# Patient Record
Sex: MALE | Race: WHITE | Employment: FULL TIME | URBAN - METROPOLITAN AREA
[De-identification: names, ages, dates, MRNs, and addresses within clinical notes are randomized per-mention and may not be internally consistent; named-entity substitution may affect disease eponyms.]

---

## 2019-03-25 ENCOUNTER — HOSPITAL ENCOUNTER (EMERGENCY)
Age: 34
Discharge: HOME | End: 2019-03-25
Attending: EMERGENCY MEDICINE
Payer: COMMERCIAL

## 2019-03-25 ENCOUNTER — APPOINTMENT (OUTPATIENT)
Dept: ULTRASOUND IMAGING | Age: 34
End: 2019-03-25
Attending: PHYSICIAN ASSISTANT
Payer: COMMERCIAL

## 2019-03-25 VITALS
TEMPERATURE: 97.4 F | BODY MASS INDEX: 38.48 KG/M2 | OXYGEN SATURATION: 100 % | WEIGHT: 253.09 LBS | RESPIRATION RATE: 18 BRPM | HEART RATE: 70 BPM

## 2019-03-25 DIAGNOSIS — N50.89 TESTICULAR SWELLING, RIGHT: ICD-10-CM

## 2019-03-25 DIAGNOSIS — N50.811 TESTICULAR PAIN, RIGHT: Primary | ICD-10-CM

## 2019-03-25 LAB
APPEARANCE UR: CLEAR
BACTERIA URNS QL MICRO: NEGATIVE /HPF
BILIRUB UR QL: NEGATIVE
COLOR UR: NORMAL
EPITH CASTS URNS QL MICRO: NORMAL /LPF
GLUCOSE UR STRIP.AUTO-MCNC: NEGATIVE MG/DL
HGB UR QL STRIP: NEGATIVE
HYALINE CASTS URNS QL MICRO: NORMAL /LPF (ref 0–5)
KETONES UR QL STRIP.AUTO: NEGATIVE MG/DL
LEUKOCYTE ESTERASE UR QL STRIP.AUTO: NEGATIVE
NITRITE UR QL STRIP.AUTO: NEGATIVE
PH UR STRIP: 5.5 [PH] (ref 5–8)
PROT UR STRIP-MCNC: NEGATIVE MG/DL
RBC #/AREA URNS HPF: NORMAL /HPF (ref 0–5)
SP GR UR REFRACTOMETRY: 1.02 (ref 1–1.03)
UA: UC IF INDICATED,UAUC: NORMAL
UROBILINOGEN UR QL STRIP.AUTO: 0.2 EU/DL (ref 0.2–1)
WBC URNS QL MICRO: NORMAL /HPF (ref 0–4)

## 2019-03-25 PROCEDURE — 81001 URINALYSIS AUTO W/SCOPE: CPT

## 2019-03-25 PROCEDURE — 99283 EMERGENCY DEPT VISIT LOW MDM: CPT

## 2019-03-25 PROCEDURE — 76870 US EXAM SCROTUM: CPT

## 2019-03-25 PROCEDURE — 87491 CHLMYD TRACH DNA AMP PROBE: CPT

## 2019-03-25 RX ORDER — HYDROCODONE BITARTRATE AND ACETAMINOPHEN 7.5; 325 MG/1; MG/1
1 TABLET ORAL
Qty: 10 TAB | Refills: 0 | Status: SHIPPED | OUTPATIENT
Start: 2019-03-25 | End: 2019-03-28

## 2019-03-25 RX ORDER — IBUPROFEN 800 MG/1
800 TABLET ORAL
Qty: 20 TAB | Refills: 0 | Status: SHIPPED | OUTPATIENT
Start: 2019-03-25 | End: 2019-04-01

## 2019-03-25 NOTE — LETTER
[place] 
[facility] 
[address] [place] [address] [address] [id number] 
[phone] Work/School Note Date: 3/25/2019 To Whom It May concern: 
 
Ambreen Ovalle was seen and treated today in the emergency room by the following provider(s): 
Attending Provider: Juliet Waldron DO Physician Assistant: IRENE Lara. Ambreen Ovalle may return to work on [id number]. Sincerely, IRENE Jones

## 2019-03-25 NOTE — ED PROVIDER NOTES
EMERGENCY DEPARTMENT HISTORY AND PHYSICAL EXAM 
 
 
Date: 3/25/2019 Patient Name: Carolyn Eller History of Presenting Illness Chief Complaint Patient presents with  Testicle Pain  
  right testicular pain with associated swelling x three days History Provided By: Patient HPI: Carolyn Eller, 29 y.o. male presents ambulatory to the ED with cc of 3 days of 9 out of 10 acute on chronic right testicular pain and swelling that is worse with palpation. He tells me he has had swelling in the right testicle for years and has had at least a low level ache with no significant pain. He tells me years ago he talked to his primary care who put him on some antibiotics and treated the problem in that manner. The intense pain symptoms started suddenly Saturday morning upon waking. He denies any fever, nausea, vomiting, abdominal, pain, dysuria, urgency, frequency or hesitancy. He denies urethral discharge. There are no other complaints, changes, or physical findings at this time. PCP: Jesus Barrera MD 
 
No current facility-administered medications on file prior to encounter. No current outpatient medications on file prior to encounter. Past History Past Medical History: 
Past Medical History:  
Diagnosis Date  Anxiety disorder  Depression  Suicidal thoughts Past Surgical History: 
Past Surgical History:  
Procedure Laterality Date  HX ORTHOPAEDIC    
 lft knee surgery  HX ORTHOPAEDIC    
 right shoulder/right knee Family History: 
History reviewed. No pertinent family history. Social History: 
Social History Tobacco Use  Smoking status: Current Every Day Smoker Packs/day: 0.50  Smokeless tobacco: Former User Substance Use Topics  Alcohol use: Yes Comment: per pt \"once a weekend 6 or 7 beers\"  Drug use: No  
 
 
Allergies: 
No Known Allergies Review of Systems Review of Systems Constitutional: Negative for fatigue and fever. HENT: Negative for congestion, ear pain and rhinorrhea. Eyes: Negative for pain and redness. Respiratory: Negative for cough and wheezing. Cardiovascular: Negative for chest pain and palpitations. Gastrointestinal: Negative for abdominal pain, nausea and vomiting. Genitourinary: Positive for testicular pain (And swelling). Negative for dysuria, frequency and urgency. Musculoskeletal: Negative for back pain, neck pain and neck stiffness. Skin: Negative for rash and wound. Neurological: Negative for weakness, light-headedness, numbness and headaches. Physical Exam  
Physical Exam  
Constitutional: He is oriented to person, place, and time. He appears well-developed and well-nourished. Non-toxic appearance. No distress. HENT:  
Head: Normocephalic and atraumatic. Head is without right periorbital erythema and without left periorbital erythema. Right Ear: External ear normal.  
Left Ear: External ear normal.  
Nose: Nose normal.  
Mouth/Throat: Uvula is midline. No trismus in the jaw. Eyes: Pupils are equal, round, and reactive to light. Conjunctivae and EOM are normal. No scleral icterus. Neck: Normal range of motion and full passive range of motion without pain. Cardiovascular: Normal rate, regular rhythm and normal heart sounds. Pulmonary/Chest: Effort normal and breath sounds normal. No accessory muscle usage. No tachypnea. No respiratory distress. He has no decreased breath sounds. He has no wheezes. Abdominal: Soft. There is no tenderness. There is no rigidity and no guarding. Genitourinary: Right testis shows swelling and tenderness. Genitourinary Comments: Bilateral descended testes Generalized swelling / enlargement of the right testicle. No specific redness Open-book maneuver does not improve symptoms. Diffuse tenderness Musculoskeletal: Normal range of motion. Neurological: He is alert and oriented to person, place, and time. He is not disoriented. No cranial nerve deficit or sensory deficit. GCS eye subscore is 4. GCS verbal subscore is 5. GCS motor subscore is 6. Skin: Skin is intact. No rash noted. Psychiatric: He has a normal mood and affect. His speech is normal.  
Nursing note and vitals reviewed. Diagnostic Study Results Labs - Recent Results (from the past 12 hour(s)) URINALYSIS W/ REFLEX CULTURE Collection Time: 03/25/19 12:37 PM  
Result Value Ref Range Color YELLOW/STRAW Appearance CLEAR CLEAR Specific gravity 1.021 1.003 - 1.030    
 pH (UA) 5.5 5.0 - 8.0 Protein NEGATIVE  NEG mg/dL Glucose NEGATIVE  NEG mg/dL Ketone NEGATIVE  NEG mg/dL Bilirubin NEGATIVE  NEG Blood NEGATIVE  NEG Urobilinogen 0.2 0.2 - 1.0 EU/dL Nitrites NEGATIVE  NEG Leukocyte Esterase NEGATIVE  NEG    
 WBC 0-4 0 - 4 /hpf  
 RBC 0-5 0 - 5 /hpf Epithelial cells FEW FEW /lpf Bacteria NEGATIVE  NEG /hpf  
 UA:UC IF INDICATED CULTURE NOT INDICATED BY UA RESULT CNI Hyaline cast 0-2 0 - 5 /lpf Radiologic Studies -  
US SCROTUM/TESTICLES Final Result IMPRESSION: Enlarged heterogeneous right testicle with hypervascularity and  
thickening of the spermatic cord. The appearance suggests intermittent torsion  
with partial infarction of the testis versus a testicular mass. CT Results  (Last 48 hours) None CXR Results  (Last 48 hours) None Medical Decision Making I am the first provider for this patient. I reviewed the vital signs, available nursing notes, past medical history, past surgical history, family history and social history. Vital Signs-Reviewed the patient's vital signs. Patient Vitals for the past 12 hrs: 
 Temp Pulse Resp SpO2  
03/25/19 1014 97.4 °F (36.3 °C) 70 18 100 % Pulse Oximetry Analysis - 100% on RA 
 
 Records Reviewed: Nursing Notes, Old Medical Records, Previous Radiology Studies and Previous Laboratory Studies Provider Notes (Medical Decision Making): DDx: Testicular torsion, testicular cancer, hydrocele, spermatocele, varicocele, epididymitis, STI 
 
1:19 PM 
Urology consult made to [facility]; awaiting reply. Patient remains stable; will continue to monitor. 1:48 PM 
Spoke with Nian Astorga from [facility]. Dr. James Souza suggests discharging the patient and having him come to the Urology Clinic in [address] [place] [address],[address] now. I believe this is safe. Patient states his understanding and agreement. 2:06 PM 
I just returned from walking patient to [address] [place] [address] Floor 
 
ED Course:  
Initial assessment performed. The patients presenting problems have been discussed, and they are in agreement with the care plan formulated and outlined with them. I have encouraged them to ask questions as they arise throughout their visit. Disposition: 
Discharge PLAN: 
1. Discharge Medication List as of 3/25/2019  1:52 PM  
  
START taking these medications Details HYDROcodone-acetaminophen (LORTAB 7.5-325) 7.5-325 mg per tablet Take 1 Tab by mouth every eight (8) hours as needed for Pain for up to 3 days. Max Daily Amount: 3 Tabs. Indications: Pain, Print, Disp-10 Tab, R-0  
  
ibuprofen (MOTRIN) 800 mg tablet Take 1 Tab by mouth every eight (8) hours as needed for Pain for up to 7 days. , Print, Disp-20 Tab, R-0  
  
  
 
2. Follow-up Information Follow up With Specialties Details Why Contact Info [facility]   Go directly to the Urology [address] [place] [address] [address]   [phone] Return to ED if worse Diagnosis Clinical Impression: 1. Testicular pain, right 2. Testicular swelling, right

## 2019-03-27 LAB
C TRACH DNA SPEC QL NAA+PROBE: NEGATIVE
N GONORRHOEA DNA SPEC QL NAA+PROBE: NEGATIVE
SAMPLE TYPE: NORMAL
SERVICE CMNT-IMP: NORMAL
SPECIMEN SOURCE: NORMAL

## 2019-04-03 ENCOUNTER — HOSPITAL ENCOUNTER (OUTPATIENT)
Dept: CT IMAGING | Age: 34
Discharge: HOME | End: 2019-04-03
Attending: UROLOGY
Payer: COMMERCIAL

## 2019-04-03 DIAGNOSIS — N50.89 SCROTAL MASS: ICD-10-CM

## 2019-04-03 PROCEDURE — 74011636320 HC RX REV CODE- 636/320: Performed by: UROLOGY

## 2019-04-03 PROCEDURE — 74177 CT ABD & PELVIS W/CONTRAST: CPT

## 2019-04-03 PROCEDURE — 71260 CT THORAX DX C+: CPT

## 2019-04-03 RX ORDER — SODIUM CHLORIDE 0.9 % (FLUSH) 0.9 %
10 SYRINGE (ML) INJECTION
Status: COMPLETED | OUTPATIENT
Start: 2019-04-03 | End: 2019-04-03

## 2019-04-03 RX ADMIN — IOPAMIDOL 100 ML: 755 INJECTION, SOLUTION INTRAVENOUS at 15:00

## 2019-04-03 RX ADMIN — Medication 10 ML: at 15:00

## 2019-04-03 RX ADMIN — IOHEXOL 50 ML: 240 INJECTION, SOLUTION INTRATHECAL; INTRAVASCULAR; INTRAVENOUS; ORAL at 15:00

## 2019-04-08 ENCOUNTER — OFFICE VISIT (OUTPATIENT)
Dept: ONCOLOGY | Age: 34
End: 2019-04-08

## 2019-04-08 VITALS
SYSTOLIC BLOOD PRESSURE: 114 MMHG | TEMPERATURE: 98.5 F | OXYGEN SATURATION: 95 % | HEIGHT: 68 IN | HEART RATE: 86 BPM | BODY MASS INDEX: 38.49 KG/M2 | WEIGHT: 254 LBS | DIASTOLIC BLOOD PRESSURE: 86 MMHG | RESPIRATION RATE: 16 BRPM

## 2019-04-08 DIAGNOSIS — T45.1X5A CINV (CHEMOTHERAPY-INDUCED NAUSEA AND VOMITING): ICD-10-CM

## 2019-04-08 DIAGNOSIS — C62.91 SEMINOMA OF RIGHT TESTIS, STAGE 2 (HCC): Primary | ICD-10-CM

## 2019-04-08 DIAGNOSIS — R11.2 CINV (CHEMOTHERAPY-INDUCED NAUSEA AND VOMITING): ICD-10-CM

## 2019-04-08 PROBLEM — E66.01 SEVERE OBESITY (HCC): Status: ACTIVE | Noted: 2019-04-08

## 2019-04-08 RX ORDER — ONDANSETRON 4 MG/1
4 TABLET, ORALLY DISINTEGRATING ORAL
Qty: 30 TAB | Refills: 2 | Status: SHIPPED | OUTPATIENT
Start: 2019-04-08 | End: 2019-07-06

## 2019-04-08 RX ORDER — PROCHLORPERAZINE MALEATE 10 MG
10 TABLET ORAL
Qty: 30 TAB | Refills: 3 | Status: SHIPPED | OUTPATIENT
Start: 2019-04-08 | End: 2019-05-08

## 2019-04-08 RX ORDER — LIDOCAINE AND PRILOCAINE 25; 25 MG/G; MG/G
CREAM TOPICAL AS NEEDED
Qty: 30 G | Refills: 0 | Status: SHIPPED | OUTPATIENT
Start: 2019-04-08 | End: 2020-02-26

## 2019-04-08 NOTE — PROGRESS NOTES
Initial chemo teaching completed on BEP, written material reviewed and copies given to Pt and girlfriend, all questions answered. Consent signed, chemo packet given.
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
## 2019-04-09 NOTE — PROGRESS NOTES
Oncology Consultation        Patient: Joyce Barber MRN: [id number]  SSN: [id number]    YOB: 1985  Age: 29 y.o. Sex: male        Diagnosis:      1. 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 Right sided radical orchiectomy    Subjective:      Joyce Barber is a 29 y.o. male male who noted a swelling in the right testicles for over 6 months. The swelling was associated with a dragging sensation. The patient then was seen by Dr. Will Sylvester. He underwent a right radical orchiectomy on 03/27/2019. The pathology shows T1c disease. A CT scan was done on 04/03 which reveals metastatic disease. The patient is now referred for evaluation and consideration of adjuvant chemotherapy. He works in the [address] [place] [address] [employer]. Review of Systems:    Constitutional: negative  Eyes: negative  Ears, Nose, Mouth, Throat, and Face: negative  Respiratory: negative  Cardiovascular: negative  Gastrointestinal: negative  Genitourinary:negative  Integument/Breast: negative  Hematologic/Lymphatic: negative  Musculoskeletal:negative  Neurological: negative    Past Medical History:   Diagnosis Date    Anxiety disorder     Depression     Suicidal thoughts      Past Surgical History:   Procedure Laterality Date    HX ORTHOPAEDIC      lft knee surgery    HX ORTHOPAEDIC      right shoulder/right knee      History reviewed. No pertinent family history. Social History     Tobacco Use    Smoking status: Current Every Day Smoker     Packs/day: 0.50    Smokeless tobacco: Former User   Substance Use Topics    Alcohol use: Yes     Comment: per pt \"once a weekend 6 or 7 beers\"      Prior to Admission medications    Medication Sig Start Date End Date Taking? Authorizing Provider   ondansetron (ZOFRAN ODT) 4 mg disintegrating tablet Take 1 Tab by mouth every eight (8) hours as needed for Nausea.  4/8/19  Yes Janae Sanders NP   prochlorperazine (COMPAZINE) 10 mg tablet Take 1 Tab by mouth every six (6) hours as needed for Nausea for up to 30 days. 4/8/19 5/8/19 Yes Jed Sanders, NP   lidocaine-prilocaine (EMLA) topical cream Apply  to affected area as needed for Pain. 4/8/19  Yes Anson Gray NP              No Known Allergies        Objective:     Vitals:    04/08/19 1408   BP: 114/86   Pulse: 86   Resp: 16   Temp: 98.5 °F (36.9 °C)   TempSrc: Oral   SpO2: 95%   Weight: 254 lb (115.2 kg)   Height: 5' 8\" (1.727 m)            Physical Exam:    GENERAL: alert, cooperative, no distress, appears stated age  EYE: conjunctivae/corneas clear. PERRL, EOM's intact. Fundi benign  LYMPHATIC: Cervical, supraclavicular, and axillary nodes normal.   THROAT & NECK: normal and no erythema or exudates noted. LUNG: clear to auscultation bilaterally  HEART: regular rate and rhythm, S1, S2 normal, no murmur, click, rub or gallop  ABDOMEN: soft, non-tender. Bowel sounds normal. No masses,  no organomegaly  EXTREMITIES:  extremities normal, atraumatic, no cyanosis or edema  SKIN: extensive tattoo on the torso and arms  NEUROLOGIC: AOx3. Gait normal. Reflexes and motor strength normal and symmetric. Cranial nerves 2-12 and sensation grossly intact. CT Results (most recent):  Results from Hospital Encounter encounter on 04/03/19   CT CHEST W CONT    Narrative INDICATION: Testicular cancer removed 3/27/2019. COMPARISON: CT abdomen pelvis 9/30/2013 and CTA thorax 12/15/2010. TECHNIQUE:  Following the uneventful intravenous administration of 100 cc  Isovue-300, 5 mm axial images were obtained through the chest, abdomen, and  pelvis. Oral contrast administered. Coronal and sagittal reconstructions were  generated. CT dose reduction was achieved through use of a standardized protocol  tailored for this examination and automatic exposure control for dose  modulation. FINDINGS:    THYROID: No nodule. MEDIASTINUM: No mass or lymphadenopathy. EVE: No mass or lymphadenopathy.   THORACIC AORTA: No dissection or aneurysm. MAIN PULMONARY ARTERY: Normal in caliber. TRACHEA/BRONCHI: Patent. ESOPHAGUS: No wall thickening or dilatation. HEART: Normal in size. PLEURA: No effusion or pneumothorax. LUNGS: No nodule, mass, or airspace disease. LIVER: Unremarkable. GALLBLADDER: Unremarkable. SPLEEN: Unremarkable. PANCREAS: No mass or duct dilation. ADRENALS: Unremarkable. KIDNEYS: No mass, calculus, or hydronephrosis. STOMACH: Unremarkable. SMALL BOWEL: No dilatation or wall thickening. COLON: No dilation or wall thickening. APPENDIX: Unremarkable. PERITONEUM: No ascites or pneumoperitoneum. RETROPERITONEUM: There is an enlarged aortocaval lymph node measuring 2.7 x 3.6  x 4.2 cm (2, 90). No other enlarged adenopathy. No aneurysm or dissection. REPRODUCTIVE ORGANS: The seminal vesicles and prostate appear unremarkable. URINARY BLADDER: No mass or calculus. BONES: No acute fracture or aggressive lesion. Partial visualization of  intramedullary canal with 2 interlocking screws in the proximal right femur. ADDITIONAL COMMENTS: There is stranding in the right inguinal canal without  evident mass or suspicious collection. Impression IMPRESSION: Enlarged aortocaval retroperitoneal adenopathy suspicious for  metastatic testicular neoplasm. No other sites of metastatic disease identified  within the thorax, abdomen, or pelvis with postoperative changes along the right  inguinal canal noted. 23X             Assessment:     1. 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 The standard of care for the treatment of T1b N2 seminomatous germ cell tumor of the testis is 3 cycles of systemic chemotherapy (BEP).  Thus I recommend administering  cycles of BEP as adjuvant treatment. The duration of this treatment plan will be for 9 weeks, intolerable side effects, or patient choice. Patient will be meeting with navigation services to discuss any financial barriers to care/estimated cost of care. We will plan to see the patient in follow up at least once per cycle, or sooner if symptoms warrant. The patient's emotional well being was addressed during this office visit and patient seems to be coping well with the diagnosis and the treatment. Common Side Effects of Chemotherapy  Decreased Blood Counts Your blood counts can decrease temporarily due to chemotherapy, they will recover over time. This is an expected side effect that your Doctor will be monitoring.  - If you experience fevers (temperature >100.4°F), bleeding or unexplained bruising, please call the office right away   Risk of Infection Your white blood cells can decrease temporarily due to chemotherapy and can put you at higher risk of infection. Washing hands frequently with soap and avoiding sick contacts can reduce your risk of infection.  - If you experience fevers (temperature >100.4°F), shaking chills, or any signs of infection, please call the office immediately   Anemia Chemotherapy can cause your red blood cells to temporarily decrease; this is an expected side effect that your Doctor will be monitoring.  - You may experience fatigue if this occurs, please notify the office if you experience bleeding, shortness of breath with minimal exertion or at rest, rapid heartbeat, or feeling as though you may lose consciousness. Hair Loss Chemotherapy can affect your hair follicles and cause you to lose hair. This can occur on your scalp hair but also all over your body including eyebrows and eye lashes   Nausea  You have been prescribed nausea medication to take if needed. Please follow the directions given to you by your Doctor.   - Please call the office if the medications you have been given are not relieving nausea. Vomiting Make sure you are taking anti-nausea medication as prescribed. Eating small amounts of bland foods frequently can help. - Please call the office right away if you are vomiting more than 4 times per day or are unable to keep down food or fluids   Diarrhea Eating small amounts of bland foods frequently can help, increase your fluid intake. It is usually ok to take Imodium for diarrhea. - Please call the office right away if you experience more than 4 episodes of watery diarrhea or if you are feeling dehydrated. You can reach Medical Oncology at [facility] with further questions or concerns at: [phone].  - Calls during normal business hours will reach our office.  - Calls after hours or on the weekend will reach an answering service and the on-call Oncologist will return your call. Plan:       > Port a cath placement  > PFT/DLCO  > Start adjuvant chemotherapy      Signed By: Lisa Tate MD     April 8, 2019         CC. Sara Warner MD  CC.  Wai Morley MD

## 2019-04-11 NOTE — PROGRESS NOTES
[facility]        04/11/19      Dear Ms. Silvana Angel    Thank you for taking the time to talk with me today. Your appointment for the [facility] education class is on May 14 at 2:00 p.m.  The class will be held at [facility], located at [address] [place] [address], in the [employer] Classroom and will last approximately 90 minutes.  You are encouraged to bring a family member or friend to class with you.  [employer] parking is available for your convenience.    I have enclosed a map of [facility] with directions to the conference room highlighted.  Also enclosed is the [facility] Questionnaire.   Please complete the questionnaire and bring it with you to class.    If you have any questions, please call me at [phone].      Sincerely,        Aurora Grimm   - [employer]   Quintin Mendes is a 29 y.o. male new patient referred by Dr. Tariq Leonardo to provider. S/P right orchiectomy on 3/27/19. Patient reports right groin pain. Patient alert and verbal but very nervous. VS stable. +ve smoker. Visit Vitals  /86 (BP 1 Location: Left arm, BP Patient Position: Sitting)   Pulse 86   Temp 98.5 °F (36.9 °C) (Oral)   Resp 16   Ht 5' 8\" (1.727 m)   Wt 254 lb (115.2 kg)   SpO2 95%   BMI 38.62 kg/m²       Pain Scale: 0 - No pain/10  Pain Location:     Health Maintenance Review: Patient reminded of \"due or due soon\" health maintenance. I have asked the patient to contact his/her primary care provider (PCP) for follow-up on his/her health maintenance.

## 2019-04-15 DIAGNOSIS — C62.91 SEMINOMA OF RIGHT TESTIS, STAGE 2 (HCC): ICD-10-CM

## 2019-04-16 PROBLEM — C62.10 MALIGNANT NEOPLASM OF DESCENDED TESTIS (HCC): Status: ACTIVE | Noted: 2019-04-16

## 2019-04-17 RX ORDER — HEPARIN 100 UNIT/ML
300-500 SYRINGE INTRAVENOUS AS NEEDED
Status: CANCELLED
Start: 2019-04-30

## 2019-04-17 RX ORDER — SODIUM CHLORIDE 0.9 % (FLUSH) 0.9 %
10 SYRINGE (ML) INJECTION AS NEEDED
Status: CANCELLED
Start: 2019-04-30

## 2019-04-17 RX ORDER — EPINEPHRINE 1 MG/ML
0.3 INJECTION, SOLUTION, CONCENTRATE INTRAVENOUS AS NEEDED
Status: CANCELLED | OUTPATIENT
Start: 2019-05-13

## 2019-04-17 RX ORDER — HYDROCORTISONE SODIUM SUCCINATE 100 MG/2ML
100 INJECTION, POWDER, FOR SOLUTION INTRAMUSCULAR; INTRAVENOUS AS NEEDED
Status: CANCELLED | OUTPATIENT
Start: 2019-05-13

## 2019-04-17 RX ORDER — HYDROCORTISONE SODIUM SUCCINATE 100 MG/2ML
100 INJECTION, POWDER, FOR SOLUTION INTRAMUSCULAR; INTRAVENOUS AS NEEDED
Status: CANCELLED | OUTPATIENT
Start: 2019-05-06

## 2019-04-17 RX ORDER — HEPARIN 100 UNIT/ML
300-500 SYRINGE INTRAVENOUS AS NEEDED
Status: CANCELLED
Start: 2019-05-02

## 2019-04-17 RX ORDER — SODIUM CHLORIDE 9 MG/ML
25 INJECTION, SOLUTION INTRAVENOUS CONTINUOUS
Status: CANCELLED
Start: 2019-05-06

## 2019-04-17 RX ORDER — DIPHENHYDRAMINE HYDROCHLORIDE 50 MG/ML
50 INJECTION, SOLUTION INTRAMUSCULAR; INTRAVENOUS AS NEEDED
Status: CANCELLED
Start: 2019-05-02

## 2019-04-17 RX ORDER — EPINEPHRINE 1 MG/ML
0.3 INJECTION, SOLUTION, CONCENTRATE INTRAVENOUS AS NEEDED
Status: CANCELLED | OUTPATIENT
Start: 2019-05-03

## 2019-04-17 RX ORDER — ACETAMINOPHEN 325 MG/1
650 TABLET ORAL AS NEEDED
Status: CANCELLED
Start: 2019-05-06

## 2019-04-17 RX ORDER — HEPARIN 100 UNIT/ML
300-500 SYRINGE INTRAVENOUS AS NEEDED
Status: CANCELLED
Start: 2019-05-03

## 2019-04-17 RX ORDER — ALBUTEROL SULFATE 0.83 MG/ML
2.5 SOLUTION RESPIRATORY (INHALATION) AS NEEDED
Status: CANCELLED
Start: 2019-04-30

## 2019-04-17 RX ORDER — SODIUM CHLORIDE 0.9 % (FLUSH) 0.9 %
10 SYRINGE (ML) INJECTION AS NEEDED
Status: CANCELLED
Start: 2019-05-13

## 2019-04-17 RX ORDER — HYDROCORTISONE SODIUM SUCCINATE 100 MG/2ML
100 INJECTION, POWDER, FOR SOLUTION INTRAMUSCULAR; INTRAVENOUS AS NEEDED
Status: CANCELLED | OUTPATIENT
Start: 2019-04-29

## 2019-04-17 RX ORDER — DIPHENHYDRAMINE HYDROCHLORIDE 50 MG/ML
50 INJECTION, SOLUTION INTRAMUSCULAR; INTRAVENOUS AS NEEDED
Status: CANCELLED
Start: 2019-05-06

## 2019-04-17 RX ORDER — SODIUM CHLORIDE 9 MG/ML
10 INJECTION INTRAMUSCULAR; INTRAVENOUS; SUBCUTANEOUS AS NEEDED
Status: CANCELLED | OUTPATIENT
Start: 2019-05-06

## 2019-04-17 RX ORDER — ONDANSETRON 2 MG/ML
8 INJECTION INTRAMUSCULAR; INTRAVENOUS AS NEEDED
Status: CANCELLED | OUTPATIENT
Start: 2019-04-29

## 2019-04-17 RX ORDER — ONDANSETRON 2 MG/ML
8 INJECTION INTRAMUSCULAR; INTRAVENOUS AS NEEDED
Status: CANCELLED | OUTPATIENT
Start: 2019-05-03

## 2019-04-17 RX ORDER — DIPHENHYDRAMINE HYDROCHLORIDE 50 MG/ML
50 INJECTION, SOLUTION INTRAMUSCULAR; INTRAVENOUS AS NEEDED
Status: CANCELLED
Start: 2019-05-01

## 2019-04-17 RX ORDER — SODIUM CHLORIDE 9 MG/ML
10 INJECTION INTRAMUSCULAR; INTRAVENOUS; SUBCUTANEOUS AS NEEDED
Status: CANCELLED | OUTPATIENT
Start: 2019-05-13

## 2019-04-17 RX ORDER — HEPARIN 100 UNIT/ML
300-500 SYRINGE INTRAVENOUS AS NEEDED
Status: CANCELLED
Start: 2019-05-06

## 2019-04-17 RX ORDER — SODIUM CHLORIDE 0.9 % (FLUSH) 0.9 %
10 SYRINGE (ML) INJECTION AS NEEDED
Status: CANCELLED
Start: 2019-04-29

## 2019-04-17 RX ORDER — ONDANSETRON 2 MG/ML
8 INJECTION INTRAMUSCULAR; INTRAVENOUS ONCE
Status: CANCELLED | OUTPATIENT
Start: 2019-05-02

## 2019-04-17 RX ORDER — HEPARIN 100 UNIT/ML
300-500 SYRINGE INTRAVENOUS AS NEEDED
Status: CANCELLED
Start: 2019-04-29

## 2019-04-17 RX ORDER — ACETAMINOPHEN 325 MG/1
650 TABLET ORAL AS NEEDED
Status: CANCELLED
Start: 2019-05-13

## 2019-04-17 RX ORDER — DIPHENHYDRAMINE HYDROCHLORIDE 50 MG/ML
25 INJECTION, SOLUTION INTRAMUSCULAR; INTRAVENOUS ONCE
Status: CANCELLED
Start: 2019-04-29

## 2019-04-17 RX ORDER — SODIUM CHLORIDE 0.9 % (FLUSH) 0.9 %
10 SYRINGE (ML) INJECTION AS NEEDED
Status: CANCELLED
Start: 2019-05-02

## 2019-04-17 RX ORDER — SODIUM CHLORIDE 9 MG/ML
10 INJECTION INTRAMUSCULAR; INTRAVENOUS; SUBCUTANEOUS AS NEEDED
Status: CANCELLED | OUTPATIENT
Start: 2019-05-01

## 2019-04-17 RX ORDER — SODIUM CHLORIDE 0.9 % (FLUSH) 0.9 %
10 SYRINGE (ML) INJECTION AS NEEDED
Status: CANCELLED
Start: 2019-05-06

## 2019-04-17 RX ORDER — ONDANSETRON 2 MG/ML
8 INJECTION INTRAMUSCULAR; INTRAVENOUS ONCE
Status: CANCELLED | OUTPATIENT
Start: 2019-04-29

## 2019-04-17 RX ORDER — ALBUTEROL SULFATE 0.83 MG/ML
2.5 SOLUTION RESPIRATORY (INHALATION) AS NEEDED
Status: CANCELLED
Start: 2019-05-06

## 2019-04-17 RX ORDER — DIPHENHYDRAMINE HYDROCHLORIDE 50 MG/ML
50 INJECTION, SOLUTION INTRAMUSCULAR; INTRAVENOUS AS NEEDED
Status: CANCELLED
Start: 2019-05-03

## 2019-04-17 RX ORDER — ONDANSETRON 2 MG/ML
8 INJECTION INTRAMUSCULAR; INTRAVENOUS AS NEEDED
Status: CANCELLED | OUTPATIENT
Start: 2019-05-01

## 2019-04-17 RX ORDER — DIPHENHYDRAMINE HYDROCHLORIDE 50 MG/ML
50 INJECTION, SOLUTION INTRAMUSCULAR; INTRAVENOUS AS NEEDED
Status: CANCELLED
Start: 2019-05-13

## 2019-04-17 RX ORDER — DIPHENHYDRAMINE HYDROCHLORIDE 50 MG/ML
50 INJECTION, SOLUTION INTRAMUSCULAR; INTRAVENOUS AS NEEDED
Status: CANCELLED
Start: 2019-04-29

## 2019-04-17 RX ORDER — ACETAMINOPHEN 325 MG/1
650 TABLET ORAL ONCE
Status: CANCELLED
Start: 2019-04-29

## 2019-04-17 RX ORDER — HEPARIN 100 UNIT/ML
300-500 SYRINGE INTRAVENOUS AS NEEDED
Status: CANCELLED
Start: 2019-05-13

## 2019-04-17 RX ORDER — HYDROCORTISONE SODIUM SUCCINATE 100 MG/2ML
100 INJECTION, POWDER, FOR SOLUTION INTRAMUSCULAR; INTRAVENOUS AS NEEDED
Status: CANCELLED | OUTPATIENT
Start: 2019-05-01

## 2019-04-17 RX ORDER — ALBUTEROL SULFATE 0.83 MG/ML
2.5 SOLUTION RESPIRATORY (INHALATION) AS NEEDED
Status: CANCELLED
Start: 2019-05-13

## 2019-04-17 RX ORDER — ACETAMINOPHEN 325 MG/1
650 TABLET ORAL AS NEEDED
Status: CANCELLED
Start: 2019-05-01

## 2019-04-17 RX ORDER — ALBUTEROL SULFATE 0.83 MG/ML
2.5 SOLUTION RESPIRATORY (INHALATION) AS NEEDED
Status: CANCELLED
Start: 2019-05-03

## 2019-04-17 RX ORDER — ONDANSETRON 2 MG/ML
8 INJECTION INTRAMUSCULAR; INTRAVENOUS AS NEEDED
Status: CANCELLED | OUTPATIENT
Start: 2019-05-13

## 2019-04-17 RX ORDER — ACETAMINOPHEN 325 MG/1
650 TABLET ORAL AS NEEDED
Status: CANCELLED
Start: 2019-04-29

## 2019-04-17 RX ORDER — ONDANSETRON 2 MG/ML
8 INJECTION INTRAMUSCULAR; INTRAVENOUS AS NEEDED
Status: CANCELLED | OUTPATIENT
Start: 2019-04-30

## 2019-04-17 RX ORDER — EPINEPHRINE 1 MG/ML
0.3 INJECTION, SOLUTION, CONCENTRATE INTRAVENOUS AS NEEDED
Status: CANCELLED | OUTPATIENT
Start: 2019-05-01

## 2019-04-17 RX ORDER — SODIUM CHLORIDE 0.9 % (FLUSH) 0.9 %
10 SYRINGE (ML) INJECTION AS NEEDED
Status: CANCELLED
Start: 2019-05-03

## 2019-04-17 RX ORDER — SODIUM CHLORIDE 9 MG/ML
25 INJECTION, SOLUTION INTRAVENOUS CONTINUOUS
Status: CANCELLED | OUTPATIENT
Start: 2019-05-01

## 2019-04-17 RX ORDER — ONDANSETRON 2 MG/ML
8 INJECTION INTRAMUSCULAR; INTRAVENOUS ONCE
Status: CANCELLED | OUTPATIENT
Start: 2019-05-03

## 2019-04-17 RX ORDER — SODIUM CHLORIDE 9 MG/ML
10 INJECTION INTRAMUSCULAR; INTRAVENOUS; SUBCUTANEOUS AS NEEDED
Status: CANCELLED | OUTPATIENT
Start: 2019-04-29

## 2019-04-17 RX ORDER — DIPHENHYDRAMINE HYDROCHLORIDE 50 MG/ML
50 INJECTION, SOLUTION INTRAMUSCULAR; INTRAVENOUS AS NEEDED
Status: CANCELLED
Start: 2019-04-30

## 2019-04-17 RX ORDER — ACETAMINOPHEN 325 MG/1
650 TABLET ORAL AS NEEDED
Status: CANCELLED
Start: 2019-05-03

## 2019-04-17 RX ORDER — ALBUTEROL SULFATE 0.83 MG/ML
2.5 SOLUTION RESPIRATORY (INHALATION) AS NEEDED
Status: CANCELLED
Start: 2019-05-01

## 2019-04-17 RX ORDER — SODIUM CHLORIDE 9 MG/ML
10 INJECTION INTRAMUSCULAR; INTRAVENOUS; SUBCUTANEOUS AS NEEDED
Status: CANCELLED | OUTPATIENT
Start: 2019-04-30

## 2019-04-17 RX ORDER — ALBUTEROL SULFATE 0.83 MG/ML
2.5 SOLUTION RESPIRATORY (INHALATION) AS NEEDED
Status: CANCELLED
Start: 2019-04-29

## 2019-04-17 RX ORDER — ONDANSETRON 2 MG/ML
8 INJECTION INTRAMUSCULAR; INTRAVENOUS AS NEEDED
Status: CANCELLED | OUTPATIENT
Start: 2019-05-06

## 2019-04-17 RX ORDER — SODIUM CHLORIDE 9 MG/ML
25 INJECTION, SOLUTION INTRAVENOUS CONTINUOUS
Status: CANCELLED
Start: 2019-05-13

## 2019-04-17 RX ORDER — SODIUM CHLORIDE 9 MG/ML
25 INJECTION, SOLUTION INTRAVENOUS CONTINUOUS
Status: CANCELLED | OUTPATIENT
Start: 2019-05-02

## 2019-04-17 RX ORDER — ONDANSETRON 2 MG/ML
8 INJECTION INTRAMUSCULAR; INTRAVENOUS AS NEEDED
Status: CANCELLED | OUTPATIENT
Start: 2019-05-02

## 2019-04-17 RX ORDER — SODIUM CHLORIDE 0.9 % (FLUSH) 0.9 %
10 SYRINGE (ML) INJECTION AS NEEDED
Status: CANCELLED
Start: 2019-05-01

## 2019-04-17 RX ORDER — ACETAMINOPHEN 325 MG/1
650 TABLET ORAL ONCE
Status: CANCELLED
Start: 2019-05-13

## 2019-04-17 RX ORDER — ACETAMINOPHEN 325 MG/1
650 TABLET ORAL AS NEEDED
Status: CANCELLED
Start: 2019-05-02

## 2019-04-17 RX ORDER — SODIUM CHLORIDE 9 MG/ML
10 INJECTION INTRAMUSCULAR; INTRAVENOUS; SUBCUTANEOUS AS NEEDED
Status: CANCELLED | OUTPATIENT
Start: 2019-05-02

## 2019-04-17 RX ORDER — ACETAMINOPHEN 325 MG/1
650 TABLET ORAL AS NEEDED
Status: CANCELLED
Start: 2019-04-30

## 2019-04-17 RX ORDER — EPINEPHRINE 1 MG/ML
0.3 INJECTION, SOLUTION, CONCENTRATE INTRAVENOUS AS NEEDED
Status: CANCELLED | OUTPATIENT
Start: 2019-05-02

## 2019-04-17 RX ORDER — ALBUTEROL SULFATE 0.83 MG/ML
2.5 SOLUTION RESPIRATORY (INHALATION) AS NEEDED
Status: CANCELLED
Start: 2019-05-02

## 2019-04-17 RX ORDER — HYDROCORTISONE SODIUM SUCCINATE 100 MG/2ML
100 INJECTION, POWDER, FOR SOLUTION INTRAMUSCULAR; INTRAVENOUS AS NEEDED
Status: CANCELLED | OUTPATIENT
Start: 2019-04-30

## 2019-04-17 RX ORDER — SODIUM CHLORIDE 9 MG/ML
25 INJECTION, SOLUTION INTRAVENOUS CONTINUOUS
Status: CANCELLED | OUTPATIENT
Start: 2019-05-03

## 2019-04-17 RX ORDER — ONDANSETRON 2 MG/ML
8 INJECTION INTRAMUSCULAR; INTRAVENOUS ONCE
Status: CANCELLED | OUTPATIENT
Start: 2019-04-30

## 2019-04-17 RX ORDER — EPINEPHRINE 1 MG/ML
0.3 INJECTION, SOLUTION, CONCENTRATE INTRAVENOUS AS NEEDED
Status: CANCELLED | OUTPATIENT
Start: 2019-05-06

## 2019-04-17 RX ORDER — ONDANSETRON 2 MG/ML
8 INJECTION INTRAMUSCULAR; INTRAVENOUS ONCE
Status: CANCELLED | OUTPATIENT
Start: 2019-05-01

## 2019-04-17 RX ORDER — DIPHENHYDRAMINE HYDROCHLORIDE 50 MG/ML
25 INJECTION, SOLUTION INTRAMUSCULAR; INTRAVENOUS ONCE
Status: CANCELLED
Start: 2019-05-13

## 2019-04-17 RX ORDER — HYDROCORTISONE SODIUM SUCCINATE 100 MG/2ML
100 INJECTION, POWDER, FOR SOLUTION INTRAMUSCULAR; INTRAVENOUS AS NEEDED
Status: CANCELLED | OUTPATIENT
Start: 2019-05-03

## 2019-04-17 RX ORDER — HEPARIN 100 UNIT/ML
300-500 SYRINGE INTRAVENOUS AS NEEDED
Status: CANCELLED
Start: 2019-05-01

## 2019-04-17 RX ORDER — HYDROCORTISONE SODIUM SUCCINATE 100 MG/2ML
100 INJECTION, POWDER, FOR SOLUTION INTRAMUSCULAR; INTRAVENOUS AS NEEDED
Status: CANCELLED | OUTPATIENT
Start: 2019-05-02

## 2019-04-17 RX ORDER — SODIUM CHLORIDE 9 MG/ML
25 INJECTION, SOLUTION INTRAVENOUS CONTINUOUS
Status: CANCELLED | OUTPATIENT
Start: 2019-04-29 | End: 2019-04-29

## 2019-04-17 RX ORDER — ACETAMINOPHEN 325 MG/1
650 TABLET ORAL ONCE
Status: CANCELLED
Start: 2019-05-06

## 2019-04-17 RX ORDER — SODIUM CHLORIDE 9 MG/ML
25 INJECTION, SOLUTION INTRAVENOUS CONTINUOUS
Status: CANCELLED | OUTPATIENT
Start: 2019-04-30

## 2019-04-17 RX ORDER — EPINEPHRINE 1 MG/ML
0.3 INJECTION, SOLUTION, CONCENTRATE INTRAVENOUS AS NEEDED
Status: CANCELLED | OUTPATIENT
Start: 2019-04-30

## 2019-04-17 RX ORDER — DIPHENHYDRAMINE HYDROCHLORIDE 50 MG/ML
25 INJECTION, SOLUTION INTRAMUSCULAR; INTRAVENOUS ONCE
Status: CANCELLED
Start: 2019-05-06

## 2019-04-17 RX ORDER — SODIUM CHLORIDE 9 MG/ML
10 INJECTION INTRAMUSCULAR; INTRAVENOUS; SUBCUTANEOUS AS NEEDED
Status: CANCELLED | OUTPATIENT
Start: 2019-05-03

## 2019-04-17 RX ORDER — EPINEPHRINE 1 MG/ML
0.3 INJECTION, SOLUTION, CONCENTRATE INTRAVENOUS AS NEEDED
Status: CANCELLED | OUTPATIENT
Start: 2019-04-29

## 2019-04-18 ENCOUNTER — HOSPITAL ENCOUNTER (OUTPATIENT)
Dept: INTERVENTIONAL RADIOLOGY/VASCULAR | Age: 34
Discharge: HOME | End: 2019-04-18
Attending: INTERNAL MEDICINE
Payer: COMMERCIAL

## 2019-04-18 VITALS
OXYGEN SATURATION: 100 % | SYSTOLIC BLOOD PRESSURE: 135 MMHG | DIASTOLIC BLOOD PRESSURE: 79 MMHG | RESPIRATION RATE: 14 BRPM | BODY MASS INDEX: 33.86 KG/M2 | HEART RATE: 63 BPM | TEMPERATURE: 98.2 F | WEIGHT: 250 LBS | HEIGHT: 72 IN

## 2019-04-18 DIAGNOSIS — C62.91 SEMINOMA OF RIGHT TESTIS, STAGE 2 (HCC): ICD-10-CM

## 2019-04-18 PROCEDURE — 77030039266 HC ADH SKN EXOFIN S2SG -A

## 2019-04-18 PROCEDURE — C1892 INTRO/SHEATH,FIXED,PEEL-AWAY: HCPCS

## 2019-04-18 PROCEDURE — 74011250636 HC RX REV CODE- 250/636: Performed by: STUDENT IN AN ORGANIZED HEALTH CARE EDUCATION/TRAINING PROGRAM

## 2019-04-18 PROCEDURE — 74011000250 HC RX REV CODE- 250: Performed by: STUDENT IN AN ORGANIZED HEALTH CARE EDUCATION/TRAINING PROGRAM

## 2019-04-18 PROCEDURE — 77030031139 HC SUT VCRL2 J&J -A

## 2019-04-18 PROCEDURE — C1788 PORT, INDWELLING, IMP: HCPCS

## 2019-04-18 PROCEDURE — 36561 INSERT TUNNELED CV CATH: CPT

## 2019-04-18 RX ORDER — LIDOCAINE HYDROCHLORIDE 20 MG/ML
20 INJECTION, SOLUTION EPIDURAL; INFILTRATION; INTRACAUDAL; PERINEURAL ONCE
Status: DISPENSED | OUTPATIENT
Start: 2019-04-18 | End: 2019-04-18

## 2019-04-18 RX ORDER — HEPARIN 100 UNIT/ML
300 SYRINGE INTRAVENOUS ONCE
Status: COMPLETED | OUTPATIENT
Start: 2019-04-18 | End: 2019-04-18

## 2019-04-18 RX ORDER — CEFAZOLIN SODIUM/WATER 2 G/20 ML
2 SYRINGE (ML) INTRAVENOUS ONCE
Status: COMPLETED | OUTPATIENT
Start: 2019-04-18 | End: 2019-04-18

## 2019-04-18 RX ORDER — LIDOCAINE HYDROCHLORIDE AND EPINEPHRINE 10; 10 MG/ML; UG/ML
20 INJECTION, SOLUTION INFILTRATION; PERINEURAL ONCE
Status: COMPLETED | OUTPATIENT
Start: 2019-04-18 | End: 2019-04-18

## 2019-04-18 RX ORDER — SODIUM CHLORIDE 9 MG/ML
25 INJECTION, SOLUTION INTRAVENOUS CONTINUOUS
Status: DISCONTINUED | OUTPATIENT
Start: 2019-04-18 | End: 2019-04-18

## 2019-04-18 RX ORDER — HEPARIN SODIUM 200 [USP'U]/100ML
400 INJECTION, SOLUTION INTRAVENOUS ONCE
Status: DISPENSED | OUTPATIENT
Start: 2019-04-18 | End: 2019-04-18

## 2019-04-18 RX ORDER — LIDOCAINE HYDROCHLORIDE 20 MG/ML
20 INJECTION, SOLUTION INFILTRATION; PERINEURAL ONCE
Status: DISCONTINUED | OUTPATIENT
Start: 2019-04-18 | End: 2019-04-18

## 2019-04-18 RX ORDER — FENTANYL CITRATE 50 UG/ML
100 INJECTION, SOLUTION INTRAMUSCULAR; INTRAVENOUS
Status: DISCONTINUED | OUTPATIENT
Start: 2019-04-18 | End: 2019-04-18

## 2019-04-18 RX ORDER — MIDAZOLAM HYDROCHLORIDE 1 MG/ML
5 INJECTION, SOLUTION INTRAMUSCULAR; INTRAVENOUS
Status: DISCONTINUED | OUTPATIENT
Start: 2019-04-18 | End: 2019-04-18

## 2019-04-18 RX ADMIN — MIDAZOLAM HYDROCHLORIDE 1 MG: 1 INJECTION, SOLUTION INTRAMUSCULAR; INTRAVENOUS at 11:04

## 2019-04-18 RX ADMIN — MIDAZOLAM HYDROCHLORIDE 2 MG: 1 INJECTION, SOLUTION INTRAMUSCULAR; INTRAVENOUS at 10:56

## 2019-04-18 RX ADMIN — MIDAZOLAM HYDROCHLORIDE 3 MG: 1 INJECTION, SOLUTION INTRAMUSCULAR; INTRAVENOUS at 10:49

## 2019-04-18 RX ADMIN — FENTANYL CITRATE 50 MCG: 50 INJECTION, SOLUTION INTRAMUSCULAR; INTRAVENOUS at 11:10

## 2019-04-18 RX ADMIN — Medication 2 G: at 10:20

## 2019-04-18 RX ADMIN — LIDOCAINE HYDROCHLORIDE AND EPINEPHRINE 20 MG: 10; 10 INJECTION, SOLUTION INFILTRATION; PERINEURAL at 11:16

## 2019-04-18 RX ADMIN — MIDAZOLAM HYDROCHLORIDE 1 MG: 1 INJECTION, SOLUTION INTRAMUSCULAR; INTRAVENOUS at 11:11

## 2019-04-18 RX ADMIN — MIDAZOLAM HYDROCHLORIDE 1 MG: 1 INJECTION, SOLUTION INTRAMUSCULAR; INTRAVENOUS at 11:07

## 2019-04-18 RX ADMIN — SODIUM BICARBONATE 3 ML: 0.2 INJECTION, SOLUTION INTRAVENOUS at 11:17

## 2019-04-18 RX ADMIN — FENTANYL CITRATE 100 MCG: 50 INJECTION, SOLUTION INTRAMUSCULAR; INTRAVENOUS at 10:50

## 2019-04-18 RX ADMIN — SODIUM CHLORIDE, PRESERVATIVE FREE 500 UNITS: 5 INJECTION INTRAVENOUS at 11:17

## 2019-04-18 RX ADMIN — SODIUM CHLORIDE 25 ML/HR: 900 INJECTION, SOLUTION INTRAVENOUS at 10:20

## 2019-04-18 RX ADMIN — FENTANYL CITRATE 50 MCG: 50 INJECTION, SOLUTION INTRAMUSCULAR; INTRAVENOUS at 11:04

## 2019-04-18 NOTE — DISCHARGE INSTRUCTIONS
[facility] [facility]  Special Procedures/Angiography Department      Radiologist:    Ricardo Purvis     Date:   April 18, 2019      [facility] Discharge Instructions      Watch for signs of infection:    1. Redness,   2. Fever, chills,   3. Increased pain, and/or drainage from the site. If this occurs, call your physician at once. Return next week for a [employer] Check check:     Do not sign in. Go to the podium, and ask them to call our department [phone]). Please try to come between 9:00AM and 1:00PM    Keep your dressing clean and dry. Leave the dressing in place until seen here next week. The dressing may be changed in your physicians office. Continue your previous diet and follow the medication reconciliation list.    You may take Tylenol, as directed on the label, for pain. Avoid ibuprofen (Advil, Motrin) and aspirin as they may cause you to bleed. Because you received sedation, you are not to drive or sign any legal documents for the next 24 hours. Do not lift anything heavier than 5 pounds with the affected arm for the next week.     If you have any questions or concerns, please call [phone] and ask for the nurse on-call

## 2019-04-19 ENCOUNTER — HOSPITAL ENCOUNTER (OUTPATIENT)
Dept: PULMONOLOGY | Age: 34
Discharge: HOME | End: 2019-04-19
Attending: INTERNAL MEDICINE
Payer: COMMERCIAL

## 2019-04-19 DIAGNOSIS — C62.91 SEMINOMA OF RIGHT TESTIS, STAGE 2 (HCC): ICD-10-CM

## 2019-04-19 PROCEDURE — 94726 PLETHYSMOGRAPHY LUNG VOLUMES: CPT

## 2019-04-19 PROCEDURE — 94729 DIFFUSING CAPACITY: CPT

## 2019-04-19 PROCEDURE — 94375 RESPIRATORY FLOW VOLUME LOOP: CPT

## 2019-04-20 NOTE — PROCEDURES
[facility]  PULMONARY FUNCTION TEST    Name:  Monica Leon  MR#:  [id number]  :  1985  ACCOUNT #:  [de-identified]  DATE OF SERVICE:  2019      REASON FOR THE TEST:  Dyspnea. Spirometry and lung volumes were performed and they revealed:  1. No airflow obstruction. 2.  No restrictive lung disease. 3.  Normal DLCO. 4.  Normal flow volume loop.       Oliver Conroy MD      [id number]  D:  2019 16:13  T:  2019 17:39  JOB #:  [id number]  CC:  Malik Valdivia MD

## 2019-04-29 ENCOUNTER — OFFICE VISIT (OUTPATIENT)
Dept: ONCOLOGY | Age: 34
End: 2019-04-29

## 2019-04-29 ENCOUNTER — HOSPITAL ENCOUNTER (OUTPATIENT)
Dept: INFUSION THERAPY | Age: 34
End: 2019-04-29
Payer: COMMERCIAL

## 2019-04-29 ENCOUNTER — HOSPITAL ENCOUNTER (OUTPATIENT)
Dept: INFUSION THERAPY | Age: 34
Discharge: HOME | End: 2019-04-29
Payer: COMMERCIAL

## 2019-04-29 VITALS
OXYGEN SATURATION: 96 % | WEIGHT: 258 LBS | TEMPERATURE: 98 F | BODY MASS INDEX: 39.1 KG/M2 | HEART RATE: 53 BPM | HEIGHT: 68 IN | DIASTOLIC BLOOD PRESSURE: 78 MMHG | SYSTOLIC BLOOD PRESSURE: 123 MMHG | RESPIRATION RATE: 18 BRPM

## 2019-04-29 VITALS
HEIGHT: 68 IN | HEART RATE: 76 BPM | BODY MASS INDEX: 39.25 KG/M2 | WEIGHT: 259 LBS | DIASTOLIC BLOOD PRESSURE: 69 MMHG | RESPIRATION RATE: 18 BRPM | TEMPERATURE: 98.2 F | OXYGEN SATURATION: 99 % | SYSTOLIC BLOOD PRESSURE: 115 MMHG

## 2019-04-29 DIAGNOSIS — C62.10 MALIGNANT NEOPLASM OF DESCENDED TESTIS, UNSPECIFIED LATERALITY (HCC): Primary | ICD-10-CM

## 2019-04-29 DIAGNOSIS — C62.91 SEMINOMA OF RIGHT TESTIS, STAGE 2 (HCC): Primary | ICD-10-CM

## 2019-04-29 LAB
ALBUMIN SERPL-MCNC: 3.7 G/DL (ref 3.5–5)
ALBUMIN/GLOB SERPL: 1.1 {RATIO} (ref 1.1–2.2)
ALP SERPL-CCNC: 93 U/L (ref 45–117)
ALT SERPL-CCNC: 46 U/L (ref 12–78)
ANION GAP SERPL CALC-SCNC: 2 MMOL/L (ref 5–15)
AST SERPL-CCNC: 18 U/L (ref 15–37)
BASOPHILS # BLD: 0.1 K/UL (ref 0–0.1)
BASOPHILS NFR BLD: 1 % (ref 0–1)
BILIRUB SERPL-MCNC: 0.9 MG/DL (ref 0.2–1)
BUN SERPL-MCNC: 10 MG/DL (ref 6–20)
BUN/CREAT SERPL: 11 (ref 12–20)
CALCIUM SERPL-MCNC: 8.8 MG/DL (ref 8.5–10.1)
CHLORIDE SERPL-SCNC: 107 MMOL/L (ref 97–108)
CO2 SERPL-SCNC: 29 MMOL/L (ref 21–32)
CREAT SERPL-MCNC: 0.93 MG/DL (ref 0.7–1.3)
DIFFERENTIAL METHOD BLD: ABNORMAL
EOSINOPHIL # BLD: 0.5 K/UL (ref 0–0.4)
EOSINOPHIL NFR BLD: 6 % (ref 0–7)
ERYTHROCYTE [DISTWIDTH] IN BLOOD BY AUTOMATED COUNT: 12.4 % (ref 11.5–14.5)
GLOBULIN SER CALC-MCNC: 3.3 G/DL (ref 2–4)
GLUCOSE SERPL-MCNC: 101 MG/DL (ref 65–100)
HCT VFR BLD AUTO: 42.7 % (ref 36.6–50.3)
HGB BLD-MCNC: 14.8 G/DL (ref 12.1–17)
IMM GRANULOCYTES # BLD AUTO: 0 K/UL (ref 0–0.04)
IMM GRANULOCYTES NFR BLD AUTO: 0 % (ref 0–0.5)
LYMPHOCYTES # BLD: 1.6 K/UL (ref 0.8–3.5)
LYMPHOCYTES NFR BLD: 20 % (ref 12–49)
MAGNESIUM SERPL-MCNC: 2.2 MG/DL (ref 1.6–2.4)
MCH RBC QN AUTO: 30.3 PG (ref 26–34)
MCHC RBC AUTO-ENTMCNC: 34.7 G/DL (ref 30–36.5)
MCV RBC AUTO: 87.5 FL (ref 80–99)
MONOCYTES # BLD: 0.9 K/UL (ref 0–1)
MONOCYTES NFR BLD: 11 % (ref 5–13)
NEUTS SEG # BLD: 4.9 K/UL (ref 1.8–8)
NEUTS SEG NFR BLD: 62 % (ref 32–75)
NRBC # BLD: 0 K/UL (ref 0–0.01)
NRBC BLD-RTO: 0 PER 100 WBC
PLATELET # BLD AUTO: 191 K/UL (ref 150–400)
PMV BLD AUTO: 9 FL (ref 8.9–12.9)
POTASSIUM SERPL-SCNC: 4 MMOL/L (ref 3.5–5.1)
PROT SERPL-MCNC: 7 G/DL (ref 6.4–8.2)
RBC # BLD AUTO: 4.88 M/UL (ref 4.1–5.7)
SODIUM SERPL-SCNC: 138 MMOL/L (ref 136–145)
WBC # BLD AUTO: 7.9 K/UL (ref 4.1–11.1)

## 2019-04-29 PROCEDURE — 96413 CHEMO IV INFUSION 1 HR: CPT

## 2019-04-29 PROCEDURE — 96411 CHEMO IV PUSH ADDL DRUG: CPT

## 2019-04-29 PROCEDURE — 96367 TX/PROPH/DG ADDL SEQ IV INF: CPT

## 2019-04-29 PROCEDURE — 85025 COMPLETE CBC W/AUTO DIFF WBC: CPT

## 2019-04-29 PROCEDURE — 80053 COMPREHEN METABOLIC PANEL: CPT

## 2019-04-29 PROCEDURE — 96417 CHEMO IV INFUS EACH ADDL SEQ: CPT

## 2019-04-29 PROCEDURE — 74011000258 HC RX REV CODE- 258: Performed by: INTERNAL MEDICINE

## 2019-04-29 PROCEDURE — 74011250637 HC RX REV CODE- 250/637: Performed by: INTERNAL MEDICINE

## 2019-04-29 PROCEDURE — 82105 ALPHA-FETOPROTEIN SERUM: CPT

## 2019-04-29 PROCEDURE — 96375 TX/PRO/DX INJ NEW DRUG ADDON: CPT

## 2019-04-29 PROCEDURE — 74011250636 HC RX REV CODE- 250/636: Performed by: INTERNAL MEDICINE

## 2019-04-29 PROCEDURE — 83735 ASSAY OF MAGNESIUM: CPT

## 2019-04-29 PROCEDURE — 77030012965 HC NDL HUBR BBMI -A

## 2019-04-29 PROCEDURE — 36415 COLL VENOUS BLD VENIPUNCTURE: CPT

## 2019-04-29 RX ORDER — ONDANSETRON 2 MG/ML
8 INJECTION INTRAMUSCULAR; INTRAVENOUS ONCE
Status: COMPLETED | OUTPATIENT
Start: 2019-04-29 | End: 2019-04-29

## 2019-04-29 RX ORDER — ACETAMINOPHEN 325 MG/1
650 TABLET ORAL ONCE
Status: COMPLETED | OUTPATIENT
Start: 2019-04-29 | End: 2019-04-29

## 2019-04-29 RX ORDER — SODIUM CHLORIDE 9 MG/ML
25 INJECTION, SOLUTION INTRAVENOUS CONTINUOUS
Status: DISPENSED | OUTPATIENT
Start: 2019-04-29 | End: 2019-04-29

## 2019-04-29 RX ORDER — SODIUM CHLORIDE 0.9 % (FLUSH) 0.9 %
10 SYRINGE (ML) INJECTION AS NEEDED
Status: ACTIVE | OUTPATIENT
Start: 2019-04-29 | End: 2019-04-29

## 2019-04-29 RX ORDER — SODIUM CHLORIDE 9 MG/ML
10 INJECTION INTRAMUSCULAR; INTRAVENOUS; SUBCUTANEOUS AS NEEDED
Status: ACTIVE | OUTPATIENT
Start: 2019-04-29 | End: 2019-04-29

## 2019-04-29 RX ORDER — DIPHENHYDRAMINE HYDROCHLORIDE 50 MG/ML
25 INJECTION, SOLUTION INTRAMUSCULAR; INTRAVENOUS ONCE
Status: COMPLETED | OUTPATIENT
Start: 2019-04-29 | End: 2019-04-29

## 2019-04-29 RX ORDER — HEPARIN 100 UNIT/ML
300-500 SYRINGE INTRAVENOUS AS NEEDED
Status: ACTIVE | OUTPATIENT
Start: 2019-04-29 | End: 2019-04-29

## 2019-04-29 RX ADMIN — SODIUM CHLORIDE 25 ML/HR: 900 INJECTION, SOLUTION INTRAVENOUS at 11:50

## 2019-04-29 RX ADMIN — SODIUM CHLORIDE 150 MG: 900 INJECTION, SOLUTION INTRAVENOUS at 11:58

## 2019-04-29 RX ADMIN — ONDANSETRON 8 MG: 2 INJECTION, SOLUTION INTRAMUSCULAR; INTRAVENOUS at 11:53

## 2019-04-29 RX ADMIN — DEXAMETHASONE SODIUM PHOSPHATE 12 MG: 4 INJECTION, SOLUTION INTRA-ARTICULAR; INTRALESIONAL; INTRAMUSCULAR; INTRAVENOUS; SOFT TISSUE at 12:25

## 2019-04-29 RX ADMIN — Medication 500 UNITS: at 15:33

## 2019-04-29 RX ADMIN — ACETAMINOPHEN 650 MG: 325 TABLET ORAL at 11:51

## 2019-04-29 RX ADMIN — DIPHENHYDRAMINE HYDROCHLORIDE 25 MG: 50 INJECTION INTRAMUSCULAR; INTRAVENOUS at 11:51

## 2019-04-29 RX ADMIN — POTASSIUM CHLORIDE: 2 INJECTION, SOLUTION, CONCENTRATE INTRAVENOUS at 10:43

## 2019-04-29 RX ADMIN — Medication 10 ML: at 15:33

## 2019-04-29 RX ADMIN — ETOPOSIDE 240 MG: 20 INJECTION INTRAVENOUS at 14:10

## 2019-04-29 RX ADMIN — CISPLATIN 48 MG: 1 INJECTION, SOLUTION INTRAVENOUS at 13:02

## 2019-04-29 RX ADMIN — BLEOMYCIN 30 UNITS: 15 INJECTION, POWDER, LYOPHILIZED, FOR SOLUTION INTRAMUSCULAR; INTRAPLEURAL; INTRAVENOUS; SUBCUTANEOUS at 12:45

## 2019-04-29 NOTE — PROGRESS NOTES
Identified pt with two pt identifiers(name and ). Reviewed record in preparation for visit and have obtained necessary documentation. Chief Complaint   Patient presents with    Other     C1,C1, Neoplasm of desending testies      There were no vitals taken for this visit. Health Maintenance Due   Topic    Pneumococcal 0-64 years (1 of 1 - PPSV23)    DTaP/Tdap/Td series (1 - Tdap)       Coordination of Care Questionnaire:  :   1) Have you been to an emergency room, urgent care, or hospitalized since your last visit? If yes, where when, and reason for visit? no       2. Have seen or consulted any other health care provider since your last visit? If yes, where when, and reason for visit? NO      3) Do you have an Advanced Directive/ Living Will in place? NO  If yes, do we have a copy on file NO  If no, would you like information NO    Patient is accompanied by partner I have received verbal consent from Pat Whitehead to discuss any/all medical information while they are present in the room.

## 2019-04-29 NOTE — PROGRESS NOTES
[facility] Note:    0900 Pt arrived at [facility] ambulatory and in no distress for C1D1 BEP. Assessment completed. Port accessed, labs drawn. Pt admits during assessment that he has tried suicide in the past by overdosing. Denies suicidal thoughts now. Eugenio Gonzalez NP notified at Dr. Simon Odell office. Pt seen in office today. Crisis number given to patient for [facility]. Medications received:  NS @ KVO  NS 1000 ml with 10 meq potassium chloride and magnesium sulfate 2 grams IV over 1 hour  zofran 8 mg IV  Tylenol 650 mg PO  Benadryl 25 mg IV  Emend 150 mg IV over 20 min  Dexamethasone 12 mg IV  Bleomycin 30 units IV over 10 min  Cisplatin 48 mg IV over 1 hour  Etoposide 240 mg IV over 1 hour    Port flushed with NS and heparin and needle removed per pt request. Discharge information reviewed with pt and copy given. Patient Vitals for the past 8 hrs:   Temp Pulse Resp BP SpO2   04/29/19 1528 -- 76 18 115/69 --   04/29/19 0930 98.2 °F (36.8 °C) 61 18 122/86 99 %         1530 Tolerated treatment well, no adverse reaction noted. D/Cd from [facility] ambulatory and in no distress accompanied by fiance. Next appt tomorrow    Recent Results (from the past 8 hour(s))   CBC WITH AUTOMATED DIFF    Collection Time: 04/29/19  9:12 AM   Result Value Ref Range    WBC 7.9 4.1 - 11.1 K/uL    RBC 4.88 4.10 - 5.70 M/uL    HGB 14.8 12.1 - 17.0 g/dL    HCT 42.7 36.6 - 50.3 %    MCV 87.5 80.0 - 99.0 FL    MCH 30.3 26.0 - 34.0 PG    MCHC 34.7 30.0 - 36.5 g/dL    RDW 12.4 11.5 - 14.5 %    PLATELET 271 449 - 029 K/uL    MPV 9.0 8.9 - 12.9 FL    NRBC 0.0 0  WBC    ABSOLUTE NRBC 0.00 0.00 - 0.01 K/uL    NEUTROPHILS 62 32 - 75 %    LYMPHOCYTES 20 12 - 49 %    MONOCYTES 11 5 - 13 %    EOSINOPHILS 6 0 - 7 %    BASOPHILS 1 0 - 1 %    IMMATURE GRANULOCYTES 0 0.0 - 0.5 %    ABS. NEUTROPHILS 4.9 1.8 - 8.0 K/UL    ABS. LYMPHOCYTES 1.6 0.8 - 3.5 K/UL    ABS. MONOCYTES 0.9 0.0 - 1.0 K/UL    ABS.  EOSINOPHILS 0.5 (H) 0.0 - 0.4 K/UL    ABS. BASOPHILS 0.1 0.0 - 0.1 K/UL    ABS. IMM. GRANS. 0.0 0.00 - 0.04 K/UL    DF AUTOMATED     METABOLIC PANEL, COMPREHENSIVE    Collection Time: 04/29/19  9:12 AM   Result Value Ref Range    Sodium 138 136 - 145 mmol/L    Potassium 4.0 3.5 - 5.1 mmol/L    Chloride 107 97 - 108 mmol/L    CO2 29 21 - 32 mmol/L    Anion gap 2 (L) 5 - 15 mmol/L    Glucose 101 (H) 65 - 100 mg/dL    BUN 10 6 - 20 MG/DL    Creatinine 0.93 0.70 - 1.30 MG/DL    BUN/Creatinine ratio 11 (L) 12 - 20      GFR est AA >60 >60 ml/min/1.73m2    GFR est non-AA >60 >60 ml/min/1.73m2    Calcium 8.8 8.5 - 10.1 MG/DL    Bilirubin, total 0.9 0.2 - 1.0 MG/DL    ALT (SGPT) 46 12 - 78 U/L    AST (SGOT) 18 15 - 37 U/L    Alk.  phosphatase 93 45 - 117 U/L    Protein, total 7.0 6.4 - 8.2 g/dL    Albumin 3.7 3.5 - 5.0 g/dL    Globulin 3.3 2.0 - 4.0 g/dL    A-G Ratio 1.1 1.1 - 2.2     MAGNESIUM    Collection Time: 04/29/19  9:12 AM   Result Value Ref Range    Magnesium 2.2 1.6 - 2.4 mg/dL

## 2019-04-29 NOTE — PROGRESS NOTES

## 2019-04-29 NOTE — PROGRESS NOTES
Side effects reviewed with pt and fiance. Symptom management discussed. Discussed what to do if suicidal thoughts return or depression worsens.

## 2019-04-29 NOTE — PROGRESS NOTES
[address], [address] [place] [address] Juan M Cunha, [address] [facility]  [phone]       Oncology progress note        Patient: Nuha Anaya MRN: [id number]  SSN: [id number]    YOB: 1985  Age: 29 y.o. Sex: male        Diagnosis:      1. 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 Right sided radical orchiectomy  2. Systemic chemotherapy,   BEP, cycle 1 day 1    Subjective:      Nuha Anaya is a 29 y.o. male male who noted a swelling in the right testicles for over 6 months. The swelling was associated with a dragging sensation. The patient then was seen by Dr. Steven Lackey. He underwent a right radical orchiectomy on 03/27/2019. The pathology shows T1c disease. A CT scan was done on 04/03 which reveals metastatic disease. Mr. Tammy Deal is here today to start BEP. He is doing well and denies any symptoms. Review of Systems:    Constitutional: negative  Eyes: negative  Ears, Nose, Mouth, Throat, and Face: negative  Respiratory: negative  Cardiovascular: negative  Gastrointestinal: negative  Genitourinary:negative  Integument/Breast: negative  Hematologic/Lymphatic: negative  Musculoskeletal:negative  Neurological: negative    Past Medical History:   Diagnosis Date    Anxiety disorder     Depression     Suicidal thoughts      Past Surgical History:   Procedure Laterality Date    HX ORTHOPAEDIC      lft knee surgery    HX ORTHOPAEDIC      right shoulder/right knee    IR INSERT TUNL CVC W PORT OVER 5 YEARS  4/18/2019      History reviewed. No pertinent family history.   Social History     Tobacco Use    Smoking status: Current Every Day Smoker     Packs/day: 0.50    Smokeless tobacco: Former User   Substance Use Topics    Alcohol use: Yes     Comment: per pt \"once a weekend 6 or 7 beers\"      Prior to Admission medications Medication Sig Start Date End Date Taking? Authorizing Provider   ondansetron (ZOFRAN ODT) 4 mg disintegrating tablet Take 1 Tab by mouth every eight (8) hours as needed for Nausea. 4/8/19  Yes Emma Sanders NP   prochlorperazine (COMPAZINE) 10 mg tablet Take 1 Tab by mouth every six (6) hours as needed for Nausea for up to 30 days. 4/8/19 5/8/19 Yes Emma Sanders NP   lidocaine-prilocaine (EMLA) topical cream Apply  to affected area as needed for Pain. 4/8/19  Yes James Estevez NP              No Known Allergies        Objective:     Vitals:    04/29/19 1005   BP: 123/78   Pulse: (!) 53   Resp: 18   Temp: 98 °F (36.7 °C)   TempSrc: Oral   SpO2: 96%   Weight: 258 lb (117 kg)   Height: 5' 8\" (1.727 m)            Physical Exam:    GENERAL: alert, cooperative, no distress, appears stated age  EYE: conjunctivae/corneas clear. PERRL, EOM's intact. Fundi benign  LYMPHATIC: Cervical, supraclavicular, and axillary nodes normal.   THROAT & NECK: normal and no erythema or exudates noted. LUNG: clear to auscultation bilaterally  HEART: regular rate and rhythm, S1, S2 normal, no murmur, click, rub or gallop  ABDOMEN: soft, non-tender. Bowel sounds normal. No masses,  no organomegaly  EXTREMITIES:  extremities normal, atraumatic, no cyanosis or edema  SKIN: extensive tattoo on the torso and arms  NEUROLOGIC: AOx3. Gait normal. Reflexes and motor strength normal and symmetric. Cranial nerves 2-12 and sensation grossly intact. CT Results (most recent):  Results from Hospital Encounter encounter on 04/03/19   CT CHEST W CONT    Narrative INDICATION: Testicular cancer removed 3/27/2019. COMPARISON: CT abdomen pelvis 9/30/2013 and CTA thorax 12/15/2010. TECHNIQUE:  Following the uneventful intravenous administration of 100 cc  Isovue-300, 5 mm axial images were obtained through the chest, abdomen, and  pelvis. Oral contrast administered. Coronal and sagittal reconstructions were  generated.  CT dose reduction was achieved through use of a standardized protocol  tailored for this examination and automatic exposure control for dose  modulation. FINDINGS:    THYROID: No nodule. MEDIASTINUM: No mass or lymphadenopathy. EVE: No mass or lymphadenopathy. THORACIC AORTA: No dissection or aneurysm. MAIN PULMONARY ARTERY: Normal in caliber. TRACHEA/BRONCHI: Patent. ESOPHAGUS: No wall thickening or dilatation. HEART: Normal in size. PLEURA: No effusion or pneumothorax. LUNGS: No nodule, mass, or airspace disease. LIVER: Unremarkable. GALLBLADDER: Unremarkable. SPLEEN: Unremarkable. PANCREAS: No mass or duct dilation. ADRENALS: Unremarkable. KIDNEYS: No mass, calculus, or hydronephrosis. STOMACH: Unremarkable. SMALL BOWEL: No dilatation or wall thickening. COLON: No dilation or wall thickening. APPENDIX: Unremarkable. PERITONEUM: No ascites or pneumoperitoneum. RETROPERITONEUM: There is an enlarged aortocaval lymph node measuring 2.7 x 3.6  x 4.2 cm (2, 90). No other enlarged adenopathy. No aneurysm or dissection. REPRODUCTIVE ORGANS: The seminal vesicles and prostate appear unremarkable. URINARY BLADDER: No mass or calculus. BONES: No acute fracture or aggressive lesion. Partial visualization of  intramedullary canal with 2 interlocking screws in the proximal right femur. ADDITIONAL COMMENTS: There is stranding in the right inguinal canal without  evident mass or suspicious collection. Impression IMPRESSION: Enlarged aortocaval retroperitoneal adenopathy suspicious for  metastatic testicular neoplasm. No other sites of metastatic disease identified  within the thorax, abdomen, or pelvis with postoperative changes along the right  inguinal canal noted.     23X       Lab Results   Component Value Date/Time    WBC 7.9 04/29/2019 09:12 AM    HGB 14.8 04/29/2019 09:12 AM    HCT 42.7 04/29/2019 09:12 AM    PLATELET 798 75/39/2875 09:12 AM    MCV 87.5 04/29/2019 09:12 AM       Lab Results   Component Value Date/Time    Sodium 138 04/29/2019 09:12 AM    Potassium 4.0 04/29/2019 09:12 AM    Chloride 107 04/29/2019 09:12 AM    CO2 29 04/29/2019 09:12 AM    Anion gap 2 (L) 04/29/2019 09:12 AM    Glucose 101 (H) 04/29/2019 09:12 AM    BUN 10 04/29/2019 09:12 AM    Creatinine 0.93 04/29/2019 09:12 AM    BUN/Creatinine ratio 11 (L) 04/29/2019 09:12 AM    GFR est AA >60 04/29/2019 09:12 AM    GFR est non-AA >60 04/29/2019 09:12 AM    Calcium 8.8 04/29/2019 09:12 AM    Bilirubin, total 0.9 04/29/2019 09:12 AM    AST (SGOT) 18 04/29/2019 09:12 AM    Alk. phosphatase 93 04/29/2019 09:12 AM    Protein, total 7.0 04/29/2019 09:12 AM    Albumin 3.7 04/29/2019 09:12 AM    Globulin 3.3 04/29/2019 09:12 AM    A-G Ratio 1.1 04/29/2019 09:12 AM    ALT (SGPT) 46 04/29/2019 09:12 AM           Assessment:     1. 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 He vocalized understanding. Blood counts are acceptable. Results reviewed with the patient. Symptom management form reviewed and scanned into the EMR under Media. Plan:       · Labs today: CBC, CMP, AFP, Beta hCG, LDH  · Proceed with Cycle #1 of BEP (Bleomycin 30 units Days 2, 9, 16; Etoposide 100mg/m2 Days 1-5, Cisplatin 20mg/m2 Days 1-5) given every 21 days x 4cycles. · Prophylactic antiemetics: Ondansetron and Dexamethasone IV days 1-5  · PRN antiemetics: Ondansetron and Prochlorperazine at home  · Return for follow up in 21 days        Signed By: Grace Feldman MD     April 29, 2019           CC. Any Dean MD  CC.  Peri Cushing, MD

## 2019-04-30 ENCOUNTER — HOSPITAL ENCOUNTER (OUTPATIENT)
Dept: INFUSION THERAPY | Age: 34
Discharge: HOME | End: 2019-04-30
Payer: COMMERCIAL

## 2019-04-30 VITALS
RESPIRATION RATE: 18 BRPM | TEMPERATURE: 98 F | SYSTOLIC BLOOD PRESSURE: 141 MMHG | DIASTOLIC BLOOD PRESSURE: 84 MMHG | HEART RATE: 70 BPM | OXYGEN SATURATION: 96 % | HEIGHT: 68 IN | BODY MASS INDEX: 39.4 KG/M2 | WEIGHT: 260 LBS

## 2019-04-30 DIAGNOSIS — C62.10 MALIGNANT NEOPLASM OF DESCENDED TESTIS, UNSPECIFIED LATERALITY (HCC): Primary | ICD-10-CM

## 2019-04-30 LAB — AFP-TM SERPL-MCNC: 1.1 NG/ML (ref 0–8.3)

## 2019-04-30 PROCEDURE — 74011000258 HC RX REV CODE- 258: Performed by: INTERNAL MEDICINE

## 2019-04-30 PROCEDURE — 74011250636 HC RX REV CODE- 250/636: Performed by: INTERNAL MEDICINE

## 2019-04-30 PROCEDURE — 96417 CHEMO IV INFUS EACH ADDL SEQ: CPT

## 2019-04-30 PROCEDURE — 96413 CHEMO IV INFUSION 1 HR: CPT

## 2019-04-30 PROCEDURE — 96375 TX/PRO/DX INJ NEW DRUG ADDON: CPT

## 2019-04-30 PROCEDURE — 96367 TX/PROPH/DG ADDL SEQ IV INF: CPT

## 2019-04-30 PROCEDURE — 77030012965 HC NDL HUBR BBMI -A

## 2019-04-30 RX ORDER — HEPARIN 100 UNIT/ML
300-500 SYRINGE INTRAVENOUS AS NEEDED
Status: ACTIVE | OUTPATIENT
Start: 2019-04-30 | End: 2019-04-30

## 2019-04-30 RX ORDER — ONDANSETRON 2 MG/ML
8 INJECTION INTRAMUSCULAR; INTRAVENOUS ONCE
Status: COMPLETED | OUTPATIENT
Start: 2019-04-30 | End: 2019-04-30

## 2019-04-30 RX ORDER — SODIUM CHLORIDE 0.9 % (FLUSH) 0.9 %
10 SYRINGE (ML) INJECTION AS NEEDED
Status: ACTIVE | OUTPATIENT
Start: 2019-04-30 | End: 2019-04-30

## 2019-04-30 RX ORDER — SODIUM CHLORIDE 9 MG/ML
10 INJECTION INTRAMUSCULAR; INTRAVENOUS; SUBCUTANEOUS AS NEEDED
Status: ACTIVE | OUTPATIENT
Start: 2019-04-30 | End: 2019-04-30

## 2019-04-30 RX ORDER — SODIUM CHLORIDE 9 MG/ML
25 INJECTION, SOLUTION INTRAVENOUS CONTINUOUS
Status: DISPENSED | OUTPATIENT
Start: 2019-04-30 | End: 2019-04-30

## 2019-04-30 RX ADMIN — Medication 500 UNITS: at 14:35

## 2019-04-30 RX ADMIN — ETOPOSIDE 240 MG: 20 INJECTION INTRAVENOUS at 13:25

## 2019-04-30 RX ADMIN — ONDANSETRON 8 MG: 2 INJECTION, SOLUTION INTRAMUSCULAR; INTRAVENOUS at 10:57

## 2019-04-30 RX ADMIN — Medication 10 ML: at 10:57

## 2019-04-30 RX ADMIN — CISPLATIN 48 MG: 1 INJECTION, SOLUTION INTRAVENOUS at 12:15

## 2019-04-30 RX ADMIN — Medication 10 ML: at 14:35

## 2019-04-30 RX ADMIN — DEXAMETHASONE SODIUM PHOSPHATE 12 MG: 4 INJECTION, SOLUTION INTRA-ARTICULAR; INTRALESIONAL; INTRAMUSCULAR; INTRAVENOUS; SOFT TISSUE at 10:58

## 2019-04-30 RX ADMIN — POTASSIUM CHLORIDE: 2 INJECTION, SOLUTION, CONCENTRATE INTRAVENOUS at 11:11

## 2019-04-30 RX ADMIN — SODIUM CHLORIDE 25 ML/HR: 900 INJECTION, SOLUTION INTRAVENOUS at 10:57

## 2019-04-30 NOTE — PROGRESS NOTES
Pt arrived to [facility] ambulatory in no acute distress at 1050 for BEP C1D2.  Assessment unremarkable except nausea for which home anti-emetics resolved. R chest port accessed without issue and positive blood return noted.     Visit Vitals  /85 (BP 1 Location: Left arm, BP Patient Position: Sitting)   Pulse 73   Temp 98 °F (36.7 °C)   Resp 18   Ht 5' 8\" (1.727 m)   Wt 117.9 kg (260 lb)   SpO2 96%   BMI 39.53 kg/m²     The following medications administered:  Noris@Lemon Curve  Zofran 8mg IVP  Decadron 12mg IV over 10 minutes  NS 1L with 10meq KCL and 2g Mag IV over 1 hour  Cisplatin 48mg IV over 1 hour  Etoposide 240mg IV over 1 hour    Report given to RUSTY Hernandez RN at 1345    Pt tolerated treatment well. Port flushed per policy and de-accessed, 2x2 and tape placed.  Pt discharged ambulatory in no acute distress accompanied by spouse. Next appointment 5/1/19 at 1100.

## 2019-05-01 ENCOUNTER — HOSPITAL ENCOUNTER (OUTPATIENT)
Dept: INFUSION THERAPY | Age: 34
Discharge: HOME | End: 2019-05-01
Payer: COMMERCIAL

## 2019-05-01 ENCOUNTER — TELEPHONE (OUTPATIENT)
Dept: CASE MANAGEMENT | Age: 34
End: 2019-05-01

## 2019-05-01 VITALS
OXYGEN SATURATION: 96 % | WEIGHT: 262.4 LBS | HEART RATE: 45 BPM | HEIGHT: 68 IN | RESPIRATION RATE: 18 BRPM | BODY MASS INDEX: 39.77 KG/M2 | SYSTOLIC BLOOD PRESSURE: 126 MMHG | DIASTOLIC BLOOD PRESSURE: 74 MMHG | TEMPERATURE: 97.9 F

## 2019-05-01 DIAGNOSIS — C62.10 MALIGNANT NEOPLASM OF DESCENDED TESTIS, UNSPECIFIED LATERALITY (HCC): Primary | ICD-10-CM

## 2019-05-01 PROCEDURE — 74011250636 HC RX REV CODE- 250/636: Performed by: INTERNAL MEDICINE

## 2019-05-01 PROCEDURE — 96375 TX/PRO/DX INJ NEW DRUG ADDON: CPT

## 2019-05-01 PROCEDURE — 96417 CHEMO IV INFUS EACH ADDL SEQ: CPT

## 2019-05-01 PROCEDURE — 96413 CHEMO IV INFUSION 1 HR: CPT

## 2019-05-01 PROCEDURE — 74011000258 HC RX REV CODE- 258: Performed by: INTERNAL MEDICINE

## 2019-05-01 PROCEDURE — 96367 TX/PROPH/DG ADDL SEQ IV INF: CPT

## 2019-05-01 PROCEDURE — 77030012965 HC NDL HUBR BBMI -A

## 2019-05-01 RX ORDER — HEPARIN 100 UNIT/ML
300-500 SYRINGE INTRAVENOUS AS NEEDED
Status: ACTIVE | OUTPATIENT
Start: 2019-05-01 | End: 2019-05-01

## 2019-05-01 RX ORDER — SODIUM CHLORIDE 9 MG/ML
10 INJECTION INTRAMUSCULAR; INTRAVENOUS; SUBCUTANEOUS AS NEEDED
Status: ACTIVE | OUTPATIENT
Start: 2019-05-01 | End: 2019-05-01

## 2019-05-01 RX ORDER — ONDANSETRON 2 MG/ML
8 INJECTION INTRAMUSCULAR; INTRAVENOUS ONCE
Status: COMPLETED | OUTPATIENT
Start: 2019-05-01 | End: 2019-05-01

## 2019-05-01 RX ORDER — SODIUM CHLORIDE 0.9 % (FLUSH) 0.9 %
10 SYRINGE (ML) INJECTION AS NEEDED
Status: ACTIVE | OUTPATIENT
Start: 2019-05-01 | End: 2019-05-01

## 2019-05-01 RX ORDER — SODIUM CHLORIDE 9 MG/ML
25 INJECTION, SOLUTION INTRAVENOUS CONTINUOUS
Status: DISPENSED | OUTPATIENT
Start: 2019-05-01 | End: 2019-05-01

## 2019-05-01 RX ADMIN — DEXAMETHASONE SODIUM PHOSPHATE 12 MG: 4 INJECTION, SOLUTION INTRA-ARTICULAR; INTRALESIONAL; INTRAMUSCULAR; INTRAVENOUS; SOFT TISSUE at 11:06

## 2019-05-01 RX ADMIN — Medication 10 ML: at 11:02

## 2019-05-01 RX ADMIN — Medication 10 ML: at 14:42

## 2019-05-01 RX ADMIN — CISPLATIN 48 MG: 1 INJECTION, SOLUTION INTRAVENOUS at 12:25

## 2019-05-01 RX ADMIN — Medication 500 UNITS: at 14:42

## 2019-05-01 RX ADMIN — POTASSIUM CHLORIDE: 2 INJECTION, SOLUTION, CONCENTRATE INTRAVENOUS at 11:20

## 2019-05-01 RX ADMIN — ETOPOSIDE 240 MG: 20 INJECTION INTRAVENOUS at 13:33

## 2019-05-01 RX ADMIN — ONDANSETRON 8 MG: 2 INJECTION, SOLUTION INTRAMUSCULAR; INTRAVENOUS at 11:04

## 2019-05-01 RX ADMIN — SODIUM CHLORIDE 25 ML/HR: 900 INJECTION, SOLUTION INTRAVENOUS at 11:04

## 2019-05-01 NOTE — PROGRESS NOTES
Pt arrived to [facility] ambulatory in no acute distress at 1055 for BEP C1D3.  Assessment unremarkable except acid reflux and fatigue. R chest port accessed without issue, however no blood return noted though fluids drip to gravity. Shira Grande NP notified, ok to use. Visit Vitals  /83 (BP 1 Location: Left arm, BP Patient Position: Sitting)   Pulse (!) 52   Temp 97.9 °F (36.6 °C)   Resp 18   Ht 5' 8\" (1.727 m)   Wt 119 kg (262 lb 6.4 oz)   SpO2 96%   BMI 39.90 kg/m²     The following medications administered:  Samy@IQMS  Zofran 8mg IVP  Decadron 12mg IV over 10 minutes  NS 1L with 10meq KCL and 2g Mag IV over 1 hour  Cisplatin 48mg IV over 1 hour  Etoposide 240mg IV over 1 hour    Visit Vitals  /74   Pulse (!) 45   Temp 97.9 °F (36.6 °C)   Resp 18   Ht 5' 8\" (1.727 m)   Wt 119 kg (262 lb 6.4 oz)   SpO2 96%   BMI 39.90 kg/m²     Pt tolerated treatment well. Port flushed per policy and de-accessed, 2x2 and tape placed.  Pt discharged ambulatory in no acute distress at 1445, accompanied by self. Next appointment 5/2/19 at 1100.

## 2019-05-01 NOTE — TELEPHONE ENCOUNTER
ONCOLOGY NURSE NAVIGATOR    Akbar Hylton 28 yo    Single, 10 yo son, Employed (temp agency)    DX: Testicular Ca    ONN contacted by [place] [facility] due to concerns expressed by Jose Iglesias. ONN introduced self and role to pt and GF. Allowed time to express immediate concerns, financial.  Insurance provides no coverage for anything cancer related. Provided w/ [employer], currently working on Care Card application w/ [address]. ONN asked what he does to bring him leona, \"sleep\"  Shared that this was concerning due to hx of depression. Currently not medicated,  did not care for the way Abilify made him feel. Refuses to make appt w/ PCP. Does not wish to return as feels he missed dx. Provided w/ [facility] pamphlet and website for [facility] PCP. Provided w/ resources for web sites to view (NCCN, Cancer Care, Patient [address]) [address] [facility] for [employer], [employer]. Advised on using meditation and massage to help. Father passed away in January, hx Parkinson's disease, hospitalized w/ pneumonia. Shares anger that father made ACP decisions without his knowledge. Discussed alienation from older brother at this time, whom he looked up to and had a relationship with. Offered active listening, shared grief resources and encouraged to continue sharing. GF @ chairside, quiet does not offer input. Pt given resources for [facility], where he currently resides. States he will be returning to [facility] address soon, which is permanent address. Share his past experience of being in [facility]. Aware to go to ER or contact Crisis Hotline if thoughts of harming self. Denies these feelings at present. Will follow.      Natacha Majano RN

## 2019-05-02 ENCOUNTER — HOSPITAL ENCOUNTER (OUTPATIENT)
Dept: INFUSION THERAPY | Age: 34
Discharge: HOME | End: 2019-05-02
Payer: COMMERCIAL

## 2019-05-02 VITALS
HEART RATE: 58 BPM | HEIGHT: 68 IN | DIASTOLIC BLOOD PRESSURE: 80 MMHG | OXYGEN SATURATION: 99 % | TEMPERATURE: 98 F | SYSTOLIC BLOOD PRESSURE: 134 MMHG | WEIGHT: 264.4 LBS | BODY MASS INDEX: 40.07 KG/M2 | RESPIRATION RATE: 16 BRPM

## 2019-05-02 DIAGNOSIS — C62.10 MALIGNANT NEOPLASM OF DESCENDED TESTIS, UNSPECIFIED LATERALITY (HCC): Primary | ICD-10-CM

## 2019-05-02 PROCEDURE — 96417 CHEMO IV INFUS EACH ADDL SEQ: CPT

## 2019-05-02 PROCEDURE — 74011250636 HC RX REV CODE- 250/636: Performed by: INTERNAL MEDICINE

## 2019-05-02 PROCEDURE — 74011000250 HC RX REV CODE- 250: Performed by: INTERNAL MEDICINE

## 2019-05-02 PROCEDURE — 96375 TX/PRO/DX INJ NEW DRUG ADDON: CPT

## 2019-05-02 PROCEDURE — 96413 CHEMO IV INFUSION 1 HR: CPT

## 2019-05-02 PROCEDURE — 74011000258 HC RX REV CODE- 258: Performed by: INTERNAL MEDICINE

## 2019-05-02 PROCEDURE — 96367 TX/PROPH/DG ADDL SEQ IV INF: CPT

## 2019-05-02 PROCEDURE — 77030012965 HC NDL HUBR BBMI -A

## 2019-05-02 RX ORDER — SODIUM CHLORIDE 9 MG/ML
10 INJECTION INTRAMUSCULAR; INTRAVENOUS; SUBCUTANEOUS AS NEEDED
Status: ACTIVE | OUTPATIENT
Start: 2019-05-02 | End: 2019-05-02

## 2019-05-02 RX ORDER — SODIUM CHLORIDE 0.9 % (FLUSH) 0.9 %
10 SYRINGE (ML) INJECTION AS NEEDED
Status: ACTIVE | OUTPATIENT
Start: 2019-05-02 | End: 2019-05-02

## 2019-05-02 RX ORDER — ONDANSETRON 2 MG/ML
8 INJECTION INTRAMUSCULAR; INTRAVENOUS ONCE
Status: COMPLETED | OUTPATIENT
Start: 2019-05-02 | End: 2019-05-02

## 2019-05-02 RX ORDER — SODIUM CHLORIDE 9 MG/ML
25 INJECTION, SOLUTION INTRAVENOUS CONTINUOUS
Status: DISPENSED | OUTPATIENT
Start: 2019-05-02 | End: 2019-05-02

## 2019-05-02 RX ORDER — HEPARIN 100 UNIT/ML
300-500 SYRINGE INTRAVENOUS AS NEEDED
Status: ACTIVE | OUTPATIENT
Start: 2019-05-02 | End: 2019-05-02

## 2019-05-02 RX ADMIN — CISPLATIN 48 MG: 1 INJECTION, SOLUTION INTRAVENOUS at 12:55

## 2019-05-02 RX ADMIN — SODIUM CHLORIDE 25 ML/HR: 900 INJECTION, SOLUTION INTRAVENOUS at 11:20

## 2019-05-02 RX ADMIN — SODIUM CHLORIDE 10 ML: 9 INJECTION INTRAMUSCULAR; INTRAVENOUS; SUBCUTANEOUS at 11:15

## 2019-05-02 RX ADMIN — ETOPOSIDE 240 MG: 20 INJECTION INTRAVENOUS at 14:00

## 2019-05-02 RX ADMIN — Medication 500 UNITS: at 15:10

## 2019-05-02 RX ADMIN — DEXAMETHASONE SODIUM PHOSPHATE 12 MG: 4 INJECTION, SOLUTION INTRA-ARTICULAR; INTRALESIONAL; INTRAMUSCULAR; INTRAVENOUS; SOFT TISSUE at 11:25

## 2019-05-02 RX ADMIN — Medication 10 ML: at 15:10

## 2019-05-02 RX ADMIN — ONDANSETRON 8 MG: 2 INJECTION, SOLUTION INTRAMUSCULAR; INTRAVENOUS at 11:20

## 2019-05-02 RX ADMIN — POTASSIUM CHLORIDE: 2 INJECTION, SOLUTION, CONCENTRATE INTRAVENOUS at 11:43

## 2019-05-02 NOTE — PROGRESS NOTES
Outpatient Infusion Center - Chemotherapy Progress Note    1110- Pt admit to [facility] for C1D4 ambulatory in stable condition. Assessment completed. Pt c/o nausea and fatigue. Right chest port accessed without issue with positive blood return. Chemotherapy Flowsheet 5/2/2019   Cycle C1D4   Date 5/2/2019   Drug / Regimen BEP   Pre Hydration given   Pre Meds given   Notes given     Patient Vitals for the past 12 hrs:   Temp Pulse Resp BP SpO2   05/02/19 1508 -- (!) 58 16 134/80 99 %   05/02/19 1112 98 °F (36.7 °C) (!) 56 16 115/74 97 %     Medications:  NS KVO  Zofran IVP  Decadron IV  Normal Saline with 10 mEq KCL and 2 g Mag 1 liter pre-hydration  Cisplatin IV  Etoposide IV    1510- Pt tolerated treatment well. Port maintained positive blood return throughout treatment, flushed with positive blood return at conclusion, and de-accessed. D/c home ambulatory in no distress accompanied by friend.  Pt aware of next [facility] appointment scheduled for 5/3 at 11 AM.

## 2019-05-03 ENCOUNTER — HOSPITAL ENCOUNTER (OUTPATIENT)
Dept: INFUSION THERAPY | Age: 34
Discharge: HOME | End: 2019-05-03
Payer: COMMERCIAL

## 2019-05-03 VITALS
SYSTOLIC BLOOD PRESSURE: 144 MMHG | HEART RATE: 56 BPM | WEIGHT: 261.1 LBS | HEIGHT: 68 IN | RESPIRATION RATE: 16 BRPM | BODY MASS INDEX: 39.57 KG/M2 | DIASTOLIC BLOOD PRESSURE: 90 MMHG | TEMPERATURE: 98.4 F | OXYGEN SATURATION: 98 %

## 2019-05-03 DIAGNOSIS — C62.10 MALIGNANT NEOPLASM OF DESCENDED TESTIS, UNSPECIFIED LATERALITY (HCC): Primary | ICD-10-CM

## 2019-05-03 PROCEDURE — 96375 TX/PRO/DX INJ NEW DRUG ADDON: CPT

## 2019-05-03 PROCEDURE — 74011250636 HC RX REV CODE- 250/636: Performed by: INTERNAL MEDICINE

## 2019-05-03 PROCEDURE — 96413 CHEMO IV INFUSION 1 HR: CPT

## 2019-05-03 PROCEDURE — 96367 TX/PROPH/DG ADDL SEQ IV INF: CPT

## 2019-05-03 PROCEDURE — 96417 CHEMO IV INFUS EACH ADDL SEQ: CPT

## 2019-05-03 PROCEDURE — 74011000258 HC RX REV CODE- 258: Performed by: INTERNAL MEDICINE

## 2019-05-03 RX ORDER — HEPARIN 100 UNIT/ML
300-500 SYRINGE INTRAVENOUS AS NEEDED
Status: ACTIVE | OUTPATIENT
Start: 2019-05-03 | End: 2019-05-03

## 2019-05-03 RX ORDER — SODIUM CHLORIDE 9 MG/ML
10 INJECTION INTRAMUSCULAR; INTRAVENOUS; SUBCUTANEOUS AS NEEDED
Status: ACTIVE | OUTPATIENT
Start: 2019-05-03 | End: 2019-05-03

## 2019-05-03 RX ORDER — SODIUM CHLORIDE 9 MG/ML
25 INJECTION, SOLUTION INTRAVENOUS CONTINUOUS
Status: DISPENSED | OUTPATIENT
Start: 2019-05-03 | End: 2019-05-03

## 2019-05-03 RX ORDER — SODIUM CHLORIDE 0.9 % (FLUSH) 0.9 %
10 SYRINGE (ML) INJECTION AS NEEDED
Status: ACTIVE | OUTPATIENT
Start: 2019-05-03 | End: 2019-05-03

## 2019-05-03 RX ORDER — ONDANSETRON 2 MG/ML
8 INJECTION INTRAMUSCULAR; INTRAVENOUS ONCE
Status: COMPLETED | OUTPATIENT
Start: 2019-05-03 | End: 2019-05-03

## 2019-05-03 RX ADMIN — Medication 500 UNITS: at 15:50

## 2019-05-03 RX ADMIN — ETOPOSIDE 240 MG: 20 INJECTION INTRAVENOUS at 14:41

## 2019-05-03 RX ADMIN — SODIUM CHLORIDE 25 ML/HR: 900 INJECTION, SOLUTION INTRAVENOUS at 11:38

## 2019-05-03 RX ADMIN — Medication 10 ML: at 15:50

## 2019-05-03 RX ADMIN — DEXAMETHASONE SODIUM PHOSPHATE 12 MG: 4 INJECTION, SOLUTION INTRA-ARTICULAR; INTRALESIONAL; INTRAMUSCULAR; INTRAVENOUS; SOFT TISSUE at 12:57

## 2019-05-03 RX ADMIN — CISPLATIN 48 MG: 1 INJECTION, SOLUTION INTRAVENOUS at 13:24

## 2019-05-03 RX ADMIN — POTASSIUM CHLORIDE: 2 INJECTION, SOLUTION, CONCENTRATE INTRAVENOUS at 11:40

## 2019-05-03 RX ADMIN — ONDANSETRON 8 MG: 2 INJECTION, SOLUTION INTRAMUSCULAR; INTRAVENOUS at 12:53

## 2019-05-03 NOTE — PROGRESS NOTES
1110 Pt arrived at [facility] ambulatory and in no distress for C1D3. Assessment completed, no new complaints voiced. Port accessed per protocol with positive blood return noted. Small abrasion noted to left of port site. Gauze applied over abrasion prior to tegaderm. Skin protectant also used. Patient Vitals for the past 12 hrs:   Temp Pulse Resp BP SpO2   05/03/19 1550 -- (!) 56 -- 144/90 --   05/03/19 1111 98.4 °F (36.9 °C) (!) 54 16 132/84 98 %       Medications received:  1 liter NS with Potassium 10 meq and Mag 2 gm  Decadron  Zofran  Cisplatin  Etoposide    1550 Tolerated treatment well, no adverse reaction noted. Port flushed and de-accessed. D/Cd from [facility] ambulatory and in no distress accompanied by self. Next appt 5/6.

## 2019-05-06 ENCOUNTER — HOSPITAL ENCOUNTER (OUTPATIENT)
Dept: INFUSION THERAPY | Age: 34
Discharge: HOME | End: 2019-05-06
Payer: COMMERCIAL

## 2019-05-06 VITALS
HEART RATE: 74 BPM | SYSTOLIC BLOOD PRESSURE: 137 MMHG | DIASTOLIC BLOOD PRESSURE: 83 MMHG | RESPIRATION RATE: 18 BRPM | OXYGEN SATURATION: 98 % | TEMPERATURE: 98.6 F

## 2019-05-06 DIAGNOSIS — C62.10 MALIGNANT NEOPLASM OF DESCENDED TESTIS, UNSPECIFIED LATERALITY (HCC): Primary | ICD-10-CM

## 2019-05-06 LAB
BASO+EOS+MONOS # BLD AUTO: 0 K/UL (ref 0.2–1.2)
BASO+EOS+MONOS NFR BLD AUTO: 1 % (ref 3.2–16.9)
DIFFERENTIAL METHOD BLD: ABNORMAL
ERYTHROCYTE [DISTWIDTH] IN BLOOD BY AUTOMATED COUNT: 12.3 % (ref 11.8–15.8)
HCT VFR BLD AUTO: 37.6 % (ref 36.6–50.3)
HGB BLD-MCNC: 13.5 G/DL (ref 12.1–17)
LYMPHOCYTES # BLD: 0.9 K/UL (ref 0.8–3.5)
LYMPHOCYTES NFR BLD: 14 % (ref 12–49)
MCH RBC QN AUTO: 30.9 PG (ref 26–34)
MCHC RBC AUTO-ENTMCNC: 35.9 G/DL (ref 30–36.5)
MCV RBC AUTO: 86 FL (ref 80–99)
NEUTS SEG # BLD: 5.6 K/UL (ref 1.8–8)
NEUTS SEG NFR BLD: 85 % (ref 32–75)
PLATELET # BLD AUTO: 160 K/UL (ref 150–400)
RBC # BLD AUTO: 4.37 M/UL (ref 4.1–5.7)
WBC # BLD AUTO: 6.5 K/UL (ref 4.1–11.1)

## 2019-05-06 PROCEDURE — 96409 CHEMO IV PUSH SNGL DRUG: CPT

## 2019-05-06 PROCEDURE — 85025 COMPLETE CBC W/AUTO DIFF WBC: CPT

## 2019-05-06 PROCEDURE — 74011000258 HC RX REV CODE- 258: Performed by: INTERNAL MEDICINE

## 2019-05-06 PROCEDURE — 74011250636 HC RX REV CODE- 250/636: Performed by: INTERNAL MEDICINE

## 2019-05-06 PROCEDURE — 74011250637 HC RX REV CODE- 250/637: Performed by: INTERNAL MEDICINE

## 2019-05-06 PROCEDURE — 36415 COLL VENOUS BLD VENIPUNCTURE: CPT

## 2019-05-06 PROCEDURE — 77030012965 HC NDL HUBR BBMI -A

## 2019-05-06 RX ORDER — HEPARIN 100 UNIT/ML
300-500 SYRINGE INTRAVENOUS AS NEEDED
Status: ACTIVE | OUTPATIENT
Start: 2019-05-06 | End: 2019-05-06

## 2019-05-06 RX ORDER — ACETAMINOPHEN 325 MG/1
650 TABLET ORAL ONCE
Status: COMPLETED | OUTPATIENT
Start: 2019-05-06 | End: 2019-05-06

## 2019-05-06 RX ORDER — SODIUM CHLORIDE 0.9 % (FLUSH) 0.9 %
10 SYRINGE (ML) INJECTION AS NEEDED
Status: ACTIVE | OUTPATIENT
Start: 2019-05-06 | End: 2019-05-06

## 2019-05-06 RX ORDER — DIPHENHYDRAMINE HYDROCHLORIDE 50 MG/ML
25 INJECTION, SOLUTION INTRAMUSCULAR; INTRAVENOUS ONCE
Status: DISCONTINUED | OUTPATIENT
Start: 2019-05-06 | End: 2019-05-06 | Stop reason: SDUPTHER

## 2019-05-06 RX ORDER — SODIUM CHLORIDE 9 MG/ML
10 INJECTION INTRAMUSCULAR; INTRAVENOUS; SUBCUTANEOUS AS NEEDED
Status: ACTIVE | OUTPATIENT
Start: 2019-05-06 | End: 2019-05-06

## 2019-05-06 RX ORDER — SODIUM CHLORIDE 9 MG/ML
25 INJECTION, SOLUTION INTRAVENOUS CONTINUOUS
Status: DISCONTINUED | OUTPATIENT
Start: 2019-05-06 | End: 2019-05-10 | Stop reason: HOSPADM

## 2019-05-06 RX ORDER — DIPHENHYDRAMINE HCL 12.5MG/5ML
12.5 ELIXIR ORAL ONCE
Status: COMPLETED | OUTPATIENT
Start: 2019-05-06 | End: 2019-05-06

## 2019-05-06 RX ADMIN — Medication 500 UNITS: at 13:29

## 2019-05-06 RX ADMIN — SODIUM CHLORIDE 25 ML/HR: 900 INJECTION, SOLUTION INTRAVENOUS at 13:14

## 2019-05-06 RX ADMIN — DIPHENHYDRAMINE HYDROCHLORIDE 12.5 MG: 12.5 SOLUTION ORAL at 11:28

## 2019-05-06 RX ADMIN — Medication 10 ML: at 11:08

## 2019-05-06 RX ADMIN — SODIUM CHLORIDE 30 UNITS: 900 INJECTION, SOLUTION INTRAVENOUS at 13:15

## 2019-05-06 RX ADMIN — Medication 10 ML: at 13:29

## 2019-05-06 RX ADMIN — ACETAMINOPHEN 650 MG: 325 TABLET ORAL at 11:28

## 2019-05-06 NOTE — PROGRESS NOTES
Pt arrived to [facility] ambulatory in no acute distress at 1100 for BEP C1D8.  Assessment unremarkable except insomnia and nausea. R chest port accessed without issue, however no blood return noted. Port patent, fluids drip to gravity. Ok to use per JARETH Alas NP. Labs obtained peripherally, CBCap. Pt requests phenergan for nausea. Notified RUSTY Smith NP, rx declined, advised Pt to increase Zofran dose to 8mg and to alternate between Zofran and Compazine. Pt receptive, though disappointed. Visit Vitals  /77 (BP 1 Location: Left arm, BP Patient Position: Sitting)   Pulse 95   Temp 98.6 °F (37 °C)   Resp 18   SpO2 98%     Recent Results (from the past 12 hour(s))   CBC WITH 3 PART DIFF    Collection Time: 05/06/19 11:12 AM   Result Value Ref Range    WBC 6.5 4.1 - 11.1 K/uL    RBC 4.37 4.10 - 5.70 M/uL    HGB 13.5 12.1 - 17.0 g/dL    HCT 37.6 36.6 - 50.3 %    MCV 86.0 80.0 - 99.0 FL    MCH 30.9 26.0 - 34.0 PG    MCHC 35.9 30.0 - 36.5 g/dL    RDW 12.3 11.8 - 15.8 %    PLATELET 821 138 - 960 K/uL    NEUTROPHILS 85 (H) 32 - 75 %    MIXED CELLS 1 (L) 3.2 - 16.9 %    LYMPHOCYTES 14 12 - 49 %    ABS. NEUTROPHILS 5.6 1.8 - 8.0 K/UL    ABS. MIXED CELLS 0.0 (L) 0.2 - 1.2 K/uL    ABS. LYMPHOCYTES 0.9 0.8 - 3.5 K/UL    DF AUTOMATED       The following medications administered:  Benadryl 12.5mg PO  Tylenol 650mg PO  Bleomycin 30 units IV over 10 minutes    Visit Vitals  /83   Pulse 74   Temp 98.6 °F (37 °C)   Resp 18   SpO2 98%     Pt tolerated treatment well. Port flushed per policy and de-accessed, 2x2 and tape placed.  Pt discharged ambulatory in no acute distress at 1330, accompanied by spouse. Next appointment 5/13/19 at 1100.

## 2019-05-08 ENCOUNTER — TELEPHONE (OUTPATIENT)
Dept: CASE MANAGEMENT | Age: 34
End: 2019-05-08

## 2019-05-09 ENCOUNTER — TELEPHONE (OUTPATIENT)
Dept: ONCOLOGY | Age: 34
End: 2019-05-09

## 2019-05-09 NOTE — TELEPHONE ENCOUNTER
[employer]  Social Work Navigator Encounter     Patient Name: Ethan Jin     Medical History:     Advance Directives:    Narrative: Sw stated he is beginning to feel better today. Pt talked about working at [employer] in [address] for [employer] beginning on Friday- he will learn so that he can do. ..    SW discussed exercise since not working - he stated he could help his Mom by cutting the grass but he needs a lawnmower. Discussed [employer].      Pt will be back in the office one week from Monday - SW will call     Barriers to Care:     Plan:   1. Pt has an appt with behavioral health - on May 15th [address] at 11:30 - arrive early for paper work. Pt has $2K mike for behavioral health. 2.  SW to call pt on Monday, May 13th to check on.

## 2019-05-09 NOTE — TELEPHONE ENCOUNTER
1600 TC rec'd from [facility], states pt came to [facility] Information desk stating had Behavioral Health appt, pt had Ravin' name from information rec'd re: Joan referral.  Ravin was contacted et met w/ pt, pt missed appt w/ Nico Cornelius LCSW. Ravin was contacting [facility] to reschedule appt. Reports pt having N/V, asked ONN to follow up. TC to pt. States he went to work yesterday for 1 hr, however had to leave due to feeling poorly, went today to speak to HR @ [employer]. Is not eligible for [facility], would not be considered for permanent position until Sept, \"they are unable to be flexible, so we decided I would go back when my treatment is over\"  Asked what he would do for income and insurance, states he has something in works which offers flexible hours. Has Medicaid application but has not applied. Ate roast beef sandwich today. Drinking 3-4 Gatorades daily, little to no water. Having 4-6 bouts of vomiting per day, bile not food. Taking Zofran 8mg every 8 hrs ATC using Compazine in between every 8 hrs as needed. Emailed [employer] teaching sheet on N/V, reviewed w/ pt. Referral to Oncology RD. Advised to call Med Onc office w/ concerns. Continues to smoke, offered assistance w/ smoking cessation, advised this contributes to nausea. Has moved back to [facility], living w/ mother (poor health/on dialysis), ended relationship w/ his GF. Still has not obtained new PCP, reminded to make an appt using [facility]. Update given to Dr. Jonathan Jessica and Irma Hull MSW.      Donta Mcbride RN

## 2019-05-12 ENCOUNTER — APPOINTMENT (OUTPATIENT)
Dept: GENERAL RADIOLOGY | Age: 34
DRG: 872 | End: 2019-05-12
Attending: EMERGENCY MEDICINE
Payer: COMMERCIAL

## 2019-05-12 ENCOUNTER — HOSPITAL ENCOUNTER (INPATIENT)
Age: 34
LOS: 3 days | Discharge: HOME | DRG: 872 | End: 2019-05-15
Attending: EMERGENCY MEDICINE | Admitting: INTERNAL MEDICINE
Payer: COMMERCIAL

## 2019-05-12 DIAGNOSIS — R11.2 NAUSEA AND VOMITING, INTRACTABILITY OF VOMITING NOT SPECIFIED, UNSPECIFIED VOMITING TYPE: ICD-10-CM

## 2019-05-12 DIAGNOSIS — C62.11 MALIGNANT NEOPLASM OF DESCENDED RIGHT TESTIS (HCC): ICD-10-CM

## 2019-05-12 DIAGNOSIS — R50.81 NEUTROPENIC FEVER (HCC): Primary | ICD-10-CM

## 2019-05-12 DIAGNOSIS — D70.9 NEUTROPENIC FEVER (HCC): Primary | ICD-10-CM

## 2019-05-12 PROBLEM — F17.200 SMOKER: Status: ACTIVE | Noted: 2019-05-12

## 2019-05-12 LAB
ALBUMIN SERPL-MCNC: 3.5 G/DL (ref 3.5–5)
ALBUMIN/GLOB SERPL: 0.9 {RATIO} (ref 1.1–2.2)
ALP SERPL-CCNC: 66 U/L (ref 45–117)
ALT SERPL-CCNC: 26 U/L (ref 12–78)
ANION GAP SERPL CALC-SCNC: 7 MMOL/L (ref 5–15)
APPEARANCE UR: CLEAR
AST SERPL-CCNC: 9 U/L (ref 15–37)
BACTERIA URNS QL MICRO: NEGATIVE /HPF
BASOPHILS # BLD: 0 K/UL (ref 0–0.1)
BASOPHILS NFR BLD: 5 % (ref 0–1)
BILIRUB SERPL-MCNC: 1 MG/DL (ref 0.2–1)
BILIRUB UR QL CFM: NEGATIVE
BUN SERPL-MCNC: 11 MG/DL (ref 6–20)
BUN/CREAT SERPL: 11 (ref 12–20)
CALCIUM SERPL-MCNC: 8.6 MG/DL (ref 8.5–10.1)
CHLORIDE SERPL-SCNC: 103 MMOL/L (ref 97–108)
CO2 SERPL-SCNC: 25 MMOL/L (ref 21–32)
COLOR UR: ABNORMAL
CREAT SERPL-MCNC: 1.01 MG/DL (ref 0.7–1.3)
DIFFERENTIAL METHOD BLD: ABNORMAL
EOSINOPHIL # BLD: 0 K/UL (ref 0–0.4)
EOSINOPHIL NFR BLD: 0 % (ref 0–7)
EPITH CASTS URNS QL MICRO: ABNORMAL /LPF
ERYTHROCYTE [DISTWIDTH] IN BLOOD BY AUTOMATED COUNT: 11.4 % (ref 11.5–14.5)
GLOBULIN SER CALC-MCNC: 4 G/DL (ref 2–4)
GLUCOSE SERPL-MCNC: 96 MG/DL (ref 65–100)
GLUCOSE UR STRIP.AUTO-MCNC: NEGATIVE MG/DL
HCT VFR BLD AUTO: 34.1 % (ref 36.6–50.3)
HGB BLD-MCNC: 12.3 G/DL (ref 12.1–17)
HGB UR QL STRIP: NEGATIVE
HYALINE CASTS URNS QL MICRO: ABNORMAL /LPF (ref 0–5)
IMM GRANULOCYTES # BLD AUTO: 0 K/UL (ref 0–0.04)
IMM GRANULOCYTES NFR BLD AUTO: 0 % (ref 0–0.5)
KETONES UR QL STRIP.AUTO: 80 MG/DL
LACTATE BLD-SCNC: 0.9 MMOL/L (ref 0.4–2)
LEUKOCYTE ESTERASE UR QL STRIP.AUTO: NEGATIVE
LYMPHOCYTES # BLD: 0.5 K/UL (ref 0.8–3.5)
LYMPHOCYTES NFR BLD: 55 % (ref 12–49)
MCH RBC QN AUTO: 30.5 PG (ref 26–34)
MCHC RBC AUTO-ENTMCNC: 36.1 G/DL (ref 30–36.5)
MCV RBC AUTO: 84.6 FL (ref 80–99)
MONOCYTES # BLD: 0.3 K/UL (ref 0–1)
MONOCYTES NFR BLD: 31 % (ref 5–13)
NEUTS SEG # BLD: 0.1 K/UL (ref 1.8–8)
NEUTS SEG NFR BLD: 9 % (ref 32–75)
NITRITE UR QL STRIP.AUTO: NEGATIVE
NRBC # BLD: 0 K/UL (ref 0–0.01)
NRBC BLD-RTO: 0 PER 100 WBC
PH UR STRIP: 6 [PH] (ref 5–8)
PLATELET # BLD AUTO: 108 K/UL (ref 150–400)
PMV BLD AUTO: 9.1 FL (ref 8.9–12.9)
POTASSIUM SERPL-SCNC: 3.8 MMOL/L (ref 3.5–5.1)
PROT SERPL-MCNC: 7.5 G/DL (ref 6.4–8.2)
PROT UR STRIP-MCNC: 30 MG/DL
RBC # BLD AUTO: 4.03 M/UL (ref 4.1–5.7)
RBC #/AREA URNS HPF: ABNORMAL /HPF (ref 0–5)
RBC MORPH BLD: ABNORMAL
SODIUM SERPL-SCNC: 135 MMOL/L (ref 136–145)
SP GR UR REFRACTOMETRY: 1.03 (ref 1–1.03)
UA: UC IF INDICATED,UAUC: ABNORMAL
UROBILINOGEN UR QL STRIP.AUTO: 1 EU/DL (ref 0.2–1)
WBC # BLD AUTO: 0.9 K/UL (ref 4.1–11.1)
WBC MORPH BLD: ABNORMAL
WBC URNS QL MICRO: ABNORMAL /HPF (ref 0–4)

## 2019-05-12 PROCEDURE — 36415 COLL VENOUS BLD VENIPUNCTURE: CPT

## 2019-05-12 PROCEDURE — 74011250636 HC RX REV CODE- 250/636: Performed by: EMERGENCY MEDICINE

## 2019-05-12 PROCEDURE — 96375 TX/PRO/DX INJ NEW DRUG ADDON: CPT

## 2019-05-12 PROCEDURE — 74011250636 HC RX REV CODE- 250/636: Performed by: INTERNAL MEDICINE

## 2019-05-12 PROCEDURE — 71045 X-RAY EXAM CHEST 1 VIEW: CPT

## 2019-05-12 PROCEDURE — 83605 ASSAY OF LACTIC ACID: CPT

## 2019-05-12 PROCEDURE — 80053 COMPREHEN METABOLIC PANEL: CPT

## 2019-05-12 PROCEDURE — 81001 URINALYSIS AUTO W/SCOPE: CPT

## 2019-05-12 PROCEDURE — 74011000258 HC RX REV CODE- 258: Performed by: EMERGENCY MEDICINE

## 2019-05-12 PROCEDURE — 96361 HYDRATE IV INFUSION ADD-ON: CPT

## 2019-05-12 PROCEDURE — 74011250637 HC RX REV CODE- 250/637: Performed by: EMERGENCY MEDICINE

## 2019-05-12 PROCEDURE — 96365 THER/PROPH/DIAG IV INF INIT: CPT

## 2019-05-12 PROCEDURE — 74011000250 HC RX REV CODE- 250: Performed by: INTERNAL MEDICINE

## 2019-05-12 PROCEDURE — 65270000015 HC RM PRIVATE ONCOLOGY

## 2019-05-12 PROCEDURE — 99285 EMERGENCY DEPT VISIT HI MDM: CPT

## 2019-05-12 PROCEDURE — 74011250637 HC RX REV CODE- 250/637: Performed by: INTERNAL MEDICINE

## 2019-05-12 PROCEDURE — 87040 BLOOD CULTURE FOR BACTERIA: CPT

## 2019-05-12 PROCEDURE — 85025 COMPLETE CBC W/AUTO DIFF WBC: CPT

## 2019-05-12 PROCEDURE — 93005 ELECTROCARDIOGRAM TRACING: CPT

## 2019-05-12 RX ORDER — IBUPROFEN 200 MG
1 TABLET ORAL DAILY
Status: DISCONTINUED | OUTPATIENT
Start: 2019-05-12 | End: 2019-05-15 | Stop reason: HOSPADM

## 2019-05-12 RX ORDER — VANCOMYCIN HYDROCHLORIDE
1250 EVERY 8 HOURS
Status: DISCONTINUED | OUTPATIENT
Start: 2019-05-13 | End: 2019-05-14

## 2019-05-12 RX ORDER — ONDANSETRON 2 MG/ML
4 INJECTION INTRAMUSCULAR; INTRAVENOUS
Status: COMPLETED | OUTPATIENT
Start: 2019-05-12 | End: 2019-05-12

## 2019-05-12 RX ORDER — LEVOFLOXACIN 5 MG/ML
750 INJECTION, SOLUTION INTRAVENOUS ONCE
Status: COMPLETED | OUTPATIENT
Start: 2019-05-12 | End: 2019-05-12

## 2019-05-12 RX ORDER — ACETAMINOPHEN 325 MG/1
650 TABLET ORAL
Status: DISCONTINUED | OUTPATIENT
Start: 2019-05-12 | End: 2019-05-15 | Stop reason: HOSPADM

## 2019-05-12 RX ORDER — SODIUM CHLORIDE 9 MG/ML
100 INJECTION, SOLUTION INTRAVENOUS CONTINUOUS
Status: DISCONTINUED | OUTPATIENT
Start: 2019-05-12 | End: 2019-05-14

## 2019-05-12 RX ORDER — LEVOFLOXACIN 5 MG/ML
750 INJECTION, SOLUTION INTRAVENOUS EVERY 24 HOURS
Status: DISCONTINUED | OUTPATIENT
Start: 2019-05-13 | End: 2019-05-15

## 2019-05-12 RX ORDER — IBUPROFEN 200 MG
1 TABLET ORAL DAILY
Status: DISCONTINUED | OUTPATIENT
Start: 2019-05-13 | End: 2019-05-12

## 2019-05-12 RX ORDER — PROCHLORPERAZINE MALEATE 10 MG
5 TABLET ORAL
COMMUNITY
End: 2019-07-01

## 2019-05-12 RX ORDER — VANCOMYCIN 2 GRAM/500 ML IN 0.9 % SODIUM CHLORIDE INTRAVENOUS
2 ONCE
Status: COMPLETED | OUTPATIENT
Start: 2019-05-12 | End: 2019-05-12

## 2019-05-12 RX ORDER — ACETAMINOPHEN 10 MG/ML
1000 INJECTION, SOLUTION INTRAVENOUS ONCE
Status: COMPLETED | OUTPATIENT
Start: 2019-05-13 | End: 2019-05-13

## 2019-05-12 RX ORDER — ONDANSETRON 2 MG/ML
4 INJECTION INTRAMUSCULAR; INTRAVENOUS
Status: DISCONTINUED | OUTPATIENT
Start: 2019-05-12 | End: 2019-05-15 | Stop reason: HOSPADM

## 2019-05-12 RX ORDER — SODIUM CHLORIDE 0.9 % (FLUSH) 0.9 %
5-10 SYRINGE (ML) INJECTION AS NEEDED
Status: DISCONTINUED | OUTPATIENT
Start: 2019-05-12 | End: 2019-05-14

## 2019-05-12 RX ORDER — ACETAMINOPHEN 325 MG/1
650 TABLET ORAL ONCE
Status: COMPLETED | OUTPATIENT
Start: 2019-05-12 | End: 2019-05-12

## 2019-05-12 RX ORDER — SODIUM CHLORIDE 0.9 % (FLUSH) 0.9 %
5-40 SYRINGE (ML) INJECTION AS NEEDED
Status: DISCONTINUED | OUTPATIENT
Start: 2019-05-12 | End: 2019-05-15 | Stop reason: HOSPADM

## 2019-05-12 RX ORDER — SODIUM CHLORIDE 0.9 % (FLUSH) 0.9 %
5-40 SYRINGE (ML) INJECTION EVERY 8 HOURS
Status: DISCONTINUED | OUTPATIENT
Start: 2019-05-12 | End: 2019-05-15 | Stop reason: HOSPADM

## 2019-05-12 RX ORDER — ENOXAPARIN SODIUM 100 MG/ML
40 INJECTION SUBCUTANEOUS EVERY 24 HOURS
Status: DISCONTINUED | OUTPATIENT
Start: 2019-05-12 | End: 2019-05-15 | Stop reason: HOSPADM

## 2019-05-12 RX ADMIN — LEVOFLOXACIN 750 MG: 5 INJECTION, SOLUTION INTRAVENOUS at 18:58

## 2019-05-12 RX ADMIN — SODIUM CHLORIDE 125 ML/HR: 900 INJECTION, SOLUTION INTRAVENOUS at 20:36

## 2019-05-12 RX ADMIN — ONDANSETRON 4 MG: 2 INJECTION INTRAMUSCULAR; INTRAVENOUS at 17:32

## 2019-05-12 RX ADMIN — SODIUM CHLORIDE 500 ML: 900 INJECTION, SOLUTION INTRAVENOUS at 17:21

## 2019-05-12 RX ADMIN — VANCOMYCIN HYDROCHLORIDE 2000 MG: 10 INJECTION, POWDER, LYOPHILIZED, FOR SOLUTION INTRAVENOUS at 20:37

## 2019-05-12 RX ADMIN — ACETAMINOPHEN 1000 MG: 10 INJECTION, SOLUTION INTRAVENOUS at 23:47

## 2019-05-12 RX ADMIN — PROCHLORPERAZINE EDISYLATE 10 MG: 5 INJECTION INTRAMUSCULAR; INTRAVENOUS at 23:46

## 2019-05-12 RX ADMIN — ONDANSETRON 4 MG: 2 INJECTION INTRAMUSCULAR; INTRAVENOUS at 20:32

## 2019-05-12 RX ADMIN — SODIUM CHLORIDE 1000 ML: 900 INJECTION, SOLUTION INTRAVENOUS at 18:58

## 2019-05-12 RX ADMIN — CEFEPIME HYDROCHLORIDE 2 G: 2 INJECTION, POWDER, FOR SOLUTION INTRAVENOUS at 18:12

## 2019-05-12 RX ADMIN — Medication 10 ML: at 20:33

## 2019-05-12 RX ADMIN — ACETAMINOPHEN 650 MG: 325 TABLET ORAL at 17:32

## 2019-05-12 NOTE — PROGRESS NOTES
Called ED to get report. Number left for RN to call back when ready. [address] [place] [address] TRANSFER - IN REPORT: 
 
Verbal report received from Ciarra(name) on Eb Morales  being received from ED(unit) for routine progression of care Report consisted of patients Situation, Background, Assessment and  
Recommendations(SBAR). Information from the following report(s) SBAR and Kardex was reviewed with the receiving nurse. Opportunity for questions and clarification was provided. Assessment completed upon patients arrival to unit and care assumed.

## 2019-05-12 NOTE — ROUTINE PROCESS
TRANSFER - OUT REPORT: 
 
Verbal report given to Matilde(name) on Pedro Marx  being transferred to [facility](unit) for routine progression of care Report consisted of patients Situation, Background, Assessment and  
Recommendations(SBAR). Information from the following report(s) SBAR, Kardex, ED Summary, Intake/Output, MAR and Recent Results was reviewed with the receiving nurse. Lines:  
Venous Access Device Upper chest (subclavicular area, right (Active) Peripheral IV 05/12/19 Left Antecubital (Active) Site Assessment Clean, dry, & intact 5/12/2019  5:13 PM  
Phlebitis Assessment 0 5/12/2019  5:13 PM  
Infiltration Assessment 0 5/12/2019  5:13 PM  
Dressing Status Clean, dry, & intact 5/12/2019  5:13 PM  
Dressing Type Transparent 5/12/2019  5:13 PM  
  
 
Opportunity for questions and clarification was provided.    
 
Patient transported with:

## 2019-05-12 NOTE — ED PROVIDER NOTES
EMERGENCY DEPARTMENT HISTORY AND PHYSICAL EXAM 
 
 
Date: 5/12/2019 Patient Name: Jossy Bravo History of Presenting Illness Chief Complaint Patient presents with  Vomiting  
  nausea vomiting since chemo treatment tuesday pt reports almost passed out today  Fever History Provided By: Patient HPI: Jossy Bravo, 29 y.o. male  presents to the ED with cc of vomiting and fever. Patient has testicular cancer and is currently undergoing chemotherapy. Last chemo treatment was 6 days ago. He woke up this morning with nausea and vomiting. Nausea vomiting was recurrent to the day and he noticed a fever of 101 at home. Denies any cough shortness of breath or chest pain. No diarrhea. No abdominal pain. Does not notice any rash or redness especially around his Port-A-Cath site. At home he did have a near syncopal episode. Oncologist is Dr. Gali Wharton. There are no other complaints, changes, or physical findings at this time. PCP: Jeff Mora MD 
 
No current facility-administered medications on file prior to encounter. Current Outpatient Medications on File Prior to Encounter Medication Sig Dispense Refill  ondansetron (ZOFRAN ODT) 4 mg disintegrating tablet Take 1 Tab by mouth every eight (8) hours as needed for Nausea. 30 Tab 2  
 lidocaine-prilocaine (EMLA) topical cream Apply  to affected area as needed for Pain. 30 g 0 Past History Past Medical History: 
Past Medical History:  
Diagnosis Date  Anxiety disorder  Depression  Suicidal thoughts  Testicle cancer ([address] Past Surgical History: 
Past Surgical History:  
Procedure Laterality Date  HX ORTHOPAEDIC    
 lft knee surgery  HX ORTHOPAEDIC    
 right shoulder/right knee  IR INSERT TUNL CVC W PORT OVER 5 YEARS  4/18/2019 Family History: No family history on file. Social History: 
Social History Tobacco Use  Smoking status: Current Every Day Smoker Packs/day: 0.50  Smokeless tobacco: Former User Substance Use Topics  Alcohol use: Yes Comment: per pt \"once a weekend 6 or 7 beers\"  Drug use: No  
 
 
Allergies: 
No Known Allergies Review of Systems Review of Systems Constitutional: Positive for fever. Negative for chills. HENT: Negative for congestion, ear pain, rhinorrhea, sore throat and trouble swallowing. Eyes: Negative for visual disturbance. Respiratory: Negative for cough, chest tightness and shortness of breath. Cardiovascular: Negative for chest pain and palpitations. Gastrointestinal: Positive for nausea and vomiting. Negative for abdominal pain, blood in stool, constipation and diarrhea. Genitourinary: Negative for decreased urine volume, difficulty urinating, dysuria and frequency. Musculoskeletal: Negative for back pain and neck pain. Skin: Negative for color change and rash. Neurological: Positive for syncope and light-headedness. Negative for dizziness, weakness and headaches. Physical Exam  
Physical Exam  
Constitutional: He is oriented to person, place, and time. He appears well-developed and well-nourished. He does not appear ill. No distress. HENT:  
Mouth/Throat: Oropharynx is clear and moist.  
Eyes: Conjunctivae are normal.  
Neck: Neck supple. Cardiovascular: Regular rhythm. Tachycardia present. Pulmonary/Chest: Effort normal and breath sounds normal. No accessory muscle usage. No respiratory distress. Abdominal: Soft. He exhibits no distension. There is no tenderness. Lymphadenopathy:  
  He has no cervical adenopathy. Neurological: He is alert and oriented to person, place, and time. He has normal strength. No cranial nerve deficit or sensory deficit. Skin: Skin is warm and dry. Nursing note and vitals reviewed. Diagnostic Study Results Labs - Recent Results (from the past 24 hour(s)) EKG, 12 LEAD, INITIAL  Collection Time: 05/12/19  5:00 PM  
 Result Value Ref Range Ventricular Rate 102 BPM  
 Atrial Rate 102 BPM  
 P-R Interval 146 ms  
 QRS Duration 82 ms Q-T Interval 308 ms QTC Calculation (Bezet) 401 ms Calculated P Axis 46 degrees Calculated T Axis 9 degrees Diagnosis Sinus tachycardia When compared with ECG of 11-JUN-2015 19:49, 
Vent. rate has increased BY  37 BPM 
  
CBC WITH AUTOMATED DIFF Collection Time: 05/12/19  5:09 PM  
Result Value Ref Range WBC 0.9 (LL) 4.1 - 11.1 K/uL  
 RBC 4.03 (L) 4.10 - 5.70 M/uL  
 HGB 12.3 12.1 - 17.0 g/dL HCT 34.1 (L) 36.6 - 50.3 % MCV 84.6 80.0 - 99.0 FL  
 MCH 30.5 26.0 - 34.0 PG  
 MCHC 36.1 30.0 - 36.5 g/dL  
 RDW 11.4 (L) 11.5 - 14.5 % PLATELET 748 (L) 090 - 400 K/uL MPV 9.1 8.9 - 12.9 FL  
 NRBC 0.0 0  WBC ABSOLUTE NRBC 0.00 0.00 - 0.01 K/uL NEUTROPHILS 9 (L) 32 - 75 % LYMPHOCYTES 55 (H) 12 - 49 % MONOCYTES 31 (H) 5 - 13 % EOSINOPHILS 0 0 - 7 % BASOPHILS 5 (H) 0 - 1 % IMMATURE GRANULOCYTES 0 0.0 - 0.5 % ABS. NEUTROPHILS 0.1 (L) 1.8 - 8.0 K/UL  
 ABS. LYMPHOCYTES 0.5 (L) 0.8 - 3.5 K/UL  
 ABS. MONOCYTES 0.3 0.0 - 1.0 K/UL  
 ABS. EOSINOPHILS 0.0 0.0 - 0.4 K/UL  
 ABS. BASOPHILS 0.0 0.0 - 0.1 K/UL  
 ABS. IMM. GRANS. 0.0 0.00 - 0.04 K/UL  
 DF MANUAL    
 RBC COMMENTS NORMOCYTIC, NORMOCHROMIC    
 WBC COMMENTS REACTIVE LYMPHS    
METABOLIC PANEL, COMPREHENSIVE Collection Time: 05/12/19  5:09 PM  
Result Value Ref Range Sodium 135 (L) 136 - 145 mmol/L Potassium 3.8 3.5 - 5.1 mmol/L Chloride 103 97 - 108 mmol/L  
 CO2 25 21 - 32 mmol/L Anion gap 7 5 - 15 mmol/L Glucose 96 65 - 100 mg/dL BUN 11 6 - 20 MG/DL Creatinine 1.01 0.70 - 1.30 MG/DL  
 BUN/Creatinine ratio 11 (L) 12 - 20 GFR est AA >60 >60 ml/min/1.73m2 GFR est non-AA >60 >60 ml/min/1.73m2 Calcium 8.6 8.5 - 10.1 MG/DL  Bilirubin, total 1.0 0.2 - 1.0 MG/DL  
 ALT (SGPT) 26 12 - 78 U/L  
 AST (SGOT) 9 (L) 15 - 37 U/L  
 Alk. phosphatase 66 45 - 117 U/L Protein, total 7.5 6.4 - 8.2 g/dL Albumin 3.5 3.5 - 5.0 g/dL Globulin 4.0 2.0 - 4.0 g/dL A-G Ratio 0.9 (L) 1.1 - 2.2 POC LACTIC ACID Collection Time: 05/12/19  5:14 PM  
Result Value Ref Range Lactic Acid (POC) 0.90 0.40 - 2.00 mmol/L Radiologic Studies -  
XR CHEST PORT Final Result IMPRESSION:  
1. No radiographic evidence of acute cardiopulmonary disease. CT Results  (Last 48 hours) None CXR Results  (Last 48 hours) 05/12/19 1826  XR CHEST PORT Final result Impression:  IMPRESSION:  
1. No radiographic evidence of acute cardiopulmonary disease. Narrative:  INDICATION: . meets SIRS criteria Additional history: Emesis since chemotherapy treatment 5 days previously. Fever. COMPARISON: Previous chest xray, yesterday. LIMITATIONS: Portable technique. Mabeline Sink FINDINGS: Single frontal view of the chest.   
.  
Lines/tubes/surgical: A port in the right chest is a catheter which projects to  
terminate in the mid SVC. Cardiac monitor leads overly the patient. Heart/mediastinum: Unremarkable. Lungs/pleura:  No focal consolidation or mass. No visualized pleural effusion or  
pneumothorax. Additional Comments: None. .  
  
  
 
 
 
Medical Decision Making I am the first provider for this patient. I reviewed the vital signs, available nursing notes, past medical history, past surgical history, family history and social history. Vital Signs-Reviewed the patient's vital signs. Patient Vitals for the past 24 hrs: 
 Temp Pulse Resp BP SpO2  
05/12/19 1814  97 18 133/78 99 % 05/12/19 1715  (!) 105 21 142/87 97 % 05/12/19 1700  (!) 104 16 135/80   
05/12/19 1654 (!) 100.7 °F (38.2 °C) (!) 103 18 135/80  Pulse Oximetry Analysis - 99% on RA Cardiac Monitor:  
Rate: 103 bpm 
Rhythm: Sinus Tachycardia EKG interpretation: (Preliminary) Rhythm: sinus tachycardia; and regular . Rate (approx.): 102; Axis: normal; MS interval: normal; QRS interval: normal ; ST/T wave: normal. 
 
Records Reviewed: Nursing Notes, Old Medical Records, Previous Radiology Studies and Previous Laboratory Studies Provider Notes (Medical Decision Making): This patient with a history of testicular cancer who is on chemotherapy presents with fever. His CBC does show that he is neutropenic. He woke up this morning with nausea and vomiting. He has no diarrhea. Abdominal exam is benign. Chest x-ray does not show any evidence of pneumo Port-A-Cath site looks good but also any erythema warmth or signs of cellulitis. Urinalysis is still pending at the time of his admission from the ER. He was given broad-spectrum antibiotics admitted to the hospitalist service for neutropenic fever and placed on such precautions. He was tachycardic on arrival does meet sirs criteria for sepsis. He is not in septic shock no other does he meet criteria for severe sepsis. ED Course:  
Initial assessment performed. The patients presenting problems have been discussed, and they are in agreement with the care plan formulated and outlined with them. I have encouraged them to ask questions as they arise throughout their visit. Orders Placed This Encounter  SEPSIS ORDERS INITIATED IN TRIAGE (DO NOT DESELECT)  SEPSIS BUNDLE INITIATED IN ED (REQUIRED)  CULTURE, BLOOD, PAIRED  XR CHEST PORT  CBC WITH AUTOMATED DIFF  
 METABOLIC PANEL, COMPREHENSIVE  
 PATHOLOGIST REVIEW  
 URINALYSIS W/ REFLEX CULTURE  
 METABOLIC PANEL, BASIC  
 DIET REGULAR  
 WEIGH PATIENT  POC LACTIC ACID (if available)  VITAL SIGNS - PER UNIT ROUTINE  
 STRICT I & O  
 NEUROLOGIC STATUS ASSESSMENT - PER UNIT ROUTINE  
 NOTIFY PROVIDER: SPECIFY Notify provider on pt's arrival to floor ONE TIME STAT  UP AD CARLIN  VITAL SIGNS PER UNIT ROUTINE  FULL CODE  
 PROTECTIVE ISOLATION  
  POC LACTIC ACID  EKG, 12 LEAD, INITIAL  
 SALINE LOCK IV ONE TIME STAT  
 SALINE LOCK IV ONE TIME STAT  sodium chloride 0.9 % bolus infusion 500 mL  sodium chloride (NS) flush 5-10 mL  acetaminophen (TYLENOL) tablet 650 mg  
 ondansetron (ZOFRAN) injection 4 mg  cefepime (MAXIPIME) 2 g in 0.9% sodium chloride (MBP/ADV) 100 mL  levoFLOXacin (LEVAQUIN) 750 mg in D5W IVPB  vancomycin (VANCOCIN) 2000 mg in  ml infusion  vancomycin (VANCOCIN) 1250 mg in  ml infusion  IP CONSULT TO HOSPITALIST  IP CONSULT TO PHARMACY - VANCOMYCIN DOSING Critical Care Time:  
0 Disposition: 
Admit Diagnosis Clinical Impression: 1. Neutropenic fever ([address] 2. Nausea and vomiting, intractability of vomiting not specified, unspecified vomiting type This note will not be viewable in [address]

## 2019-05-12 NOTE — H&P
Hospitalist Admission NoteNAME: Jaja Sawyer :  1985 MRN:  [id number] Date/Time:  2019 6:46 PM 
 
Patient PCP: Jose R Ramon MD 
______________________________________________________________________ Given the patient's current clinical presentation, I have a high level of concern for decompensation if discharged from the emergency department. Complex decision making was performed, which includes reviewing the patient's available past medical records, laboratory results, and x-ray films. My assessment of this patient's clinical condition and my plan of care is as follows. Assessment / Plan: 
Sepsis/Neutropenic Fever POA In settings of metastatic testicular cancer on chemo therapy R/o Line infection (have port for chemo) Admit IVF IV Vancomycin, Cefepime and Levaquin Hematology consult F/u blood cultures CXR clear UA with reflex cultures pending, denies any urinary complaints Nausea/Vomiting Due to chemo/sepsis PRN IV Zofran Abdomen is benign If continued then may warrant further workup Smoker Offered nicotine patch Code Status: Full Surrogate Decision Maker: Parents DVT Prophylaxis: Lovenox Baseline: functional  
  
Subjective: CHIEF COMPLAINT: fever HISTORY OF PRESENT ILLNESS:    
Trey Smith is a 29 y.o.  male who presents with fever at home. As per patient, since chemo 1 week ago he is been having nausea and vomiting. Today, his mother saw him sweating so checked the temperature so noted to be 101 at home. Pt reported history of testicular cancer and undergoing chemotherapy. Pt denies any abdominal pain, chest pain, problems urination, cough, shortness of breath. He reported mild throat pain due to vomiting. In ED pt noted to have neutropenic fever. We were asked to admit for work up and evaluation of the above problems. Past Medical History:  
Diagnosis Date  Anxiety disorder  Depression  Suicidal thoughts  Testicle cancer ([facility] [address] Past Surgical History:  
Procedure Laterality Date  HX ORTHOPAEDIC    
 lft knee surgery  HX ORTHOPAEDIC    
 right shoulder/right knee  IR INSERT TUNL CVC W PORT OVER 5 YEARS  4/18/2019 Social History Tobacco Use  Smoking status: Current Every Day Smoker Packs/day: 0.50  Smokeless tobacco: Former User Substance Use Topics  Alcohol use: Yes Comment: per pt \"once a weekend 6 or 7 beers\" Family history: denies any cancer history in family No Known Allergies Prior to Admission medications Medication Sig Start Date End Date Taking? Authorizing Provider  
ondansetron (ZOFRAN ODT) 4 mg disintegrating tablet Take 1 Tab by mouth every eight (8) hours as needed for Nausea. 4/8/19   Jg Maldonado NP  
lidocaine-prilocaine (EMLA) topical cream Apply  to affected area as needed for Pain. 4/8/19   Jg Maldonado NP  
 
 
REVIEW OF SYSTEMS:    
I am not able to complete the review of systems because: The patient is intubated and sedated The patient has altered mental status due to his acute medical problems The patient has baseline aphasia from prior stroke(s) The patient has baseline dementia and is not reliable historian The patient is in acute medical distress and unable to provide information Total of 12 systems reviewed as follows:   
   POSITIVE= underlined text  Negative = text not underlined General:  fever, chills, sweats, generalized weakness, weight loss/gain,  
   loss of appetite Eyes:    blurred vision, eye pain, loss of vision, double vision ENT:    rhinorrhea, pharyngitis Respiratory:   cough, sputum production, SOB, HUSAIN, wheezing, pleuritic pain  
Cardiology:   chest pain, palpitations, orthopnea, PND, edema, syncope Gastrointestinal:  abdominal pain , N/V, diarrhea, dysphagia, constipation, bleeding Genitourinary:  frequency, urgency, dysuria, hematuria, incontinence Muskuloskeletal :  arthralgia, myalgia, back pain Hematology:  easy bruising, nose or gum bleeding, lymphadenopathy Dermatological: rash, ulceration, pruritis, color change / jaundice Endocrine:   hot flashes or polydipsia Neurological:  headache, dizziness, confusion, focal weakness, paresthesia, Speech difficulties, memory loss, gait difficulty Psychological: Feelings of anxiety, depression, agitation Objective: VITALS:   
Visit Vitals /78 (BP 1 Location: Right arm, BP Patient Position: At rest) Pulse 97 Temp (!) 100.7 °F (38.2 °C) Resp 18 Ht 5' 8\" (1.727 m) Wt 118.4 kg (261 lb 0.4 oz) SpO2 99% BMI 39.69 kg/m² PHYSICAL EXAM: 
 
 
_______________________________________________________________________ Care Plan discussed with: 
  Comments Patient y Family RN y   
Care Manager Consultant:  dheeraj ED physician  
_______________________________________________________________________ Expected  Disposition:  
Home with Family y HH/PT/OT/RN   
SNF/LTC   
[facility]   
 ________________________________________________________________________ TOTAL TIME: 55 Minutes Critical Care Provided     Minutes non procedure based Comments  
 y Reviewed previous records  
>50% of visit spent in counseling and coordination of care y Discussion with patient and family and questions answered 
  
 
________________________________________________________________________ Signed: Lay Calderon MD 
 
Procedures: see electronic medical records for all procedures/Xrays and details which were not copied into this note but were reviewed prior to creation of Plan. LAB DATA REVIEWED:   
Recent Results (from the past 24 hour(s)) EKG, 12 LEAD, INITIAL Collection Time: 05/12/19  5:00 PM  
Result Value Ref Range Ventricular Rate 102 BPM  
 Atrial Rate 102 BPM  
 P-R Interval 146 ms  
 QRS Duration 82 ms Q-T Interval 308 ms QTC Calculation (Bezet) 401 ms Calculated P Axis 46 degrees Calculated T Axis 9 degrees Diagnosis Sinus tachycardia When compared with ECG of 11-JUN-2015 19:49, 
Vent. rate has increased BY  37 BPM 
  
CBC WITH AUTOMATED DIFF Collection Time: 05/12/19  5:09 PM  
Result Value Ref Range WBC 0.9 (LL) 4.1 - 11.1 K/uL  
 RBC 4.03 (L) 4.10 - 5.70 M/uL  
 HGB 12.3 12.1 - 17.0 g/dL HCT 34.1 (L) 36.6 - 50.3 % MCV 84.6 80.0 - 99.0 FL  
 MCH 30.5 26.0 - 34.0 PG  
 MCHC 36.1 30.0 - 36.5 g/dL  
 RDW 11.4 (L) 11.5 - 14.5 % PLATELET 585 (L) 176 - 400 K/uL MPV 9.1 8.9 - 12.9 FL  
 NRBC 0.0 0  WBC ABSOLUTE NRBC 0.00 0.00 - 0.01 K/uL NEUTROPHILS 9 (L) 32 - 75 % LYMPHOCYTES 55 (H) 12 - 49 % MONOCYTES 31 (H) 5 - 13 % EOSINOPHILS 0 0 - 7 % BASOPHILS 5 (H) 0 - 1 % IMMATURE GRANULOCYTES 0 0.0 - 0.5 % ABS. NEUTROPHILS 0.1 (L) 1.8 - 8.0 K/UL  
 ABS. LYMPHOCYTES 0.5 (L) 0.8 - 3.5 K/UL  
 ABS. MONOCYTES 0.3 0.0 - 1.0 K/UL  
 ABS. EOSINOPHILS 0.0 0.0 - 0.4 K/UL  
 ABS. BASOPHILS 0.0 0.0 - 0.1 K/UL ABS. IMM. GRANS. 0.0 0.00 - 0.04 K/UL  
 DF MANUAL    
 RBC COMMENTS NORMOCYTIC, NORMOCHROMIC    
 WBC COMMENTS REACTIVE LYMPHS    
METABOLIC PANEL, COMPREHENSIVE Collection Time: 05/12/19  5:09 PM  
Result Value Ref Range Sodium 135 (L) 136 - 145 mmol/L Potassium 3.8 3.5 - 5.1 mmol/L Chloride 103 97 - 108 mmol/L  
 CO2 25 21 - 32 mmol/L Anion gap 7 5 - 15 mmol/L Glucose 96 65 - 100 mg/dL BUN 11 6 - 20 MG/DL Creatinine 1.01 0.70 - 1.30 MG/DL  
 BUN/Creatinine ratio 11 (L) 12 - 20 GFR est AA >60 >60 ml/min/1.73m2 GFR est non-AA >60 >60 ml/min/1.73m2 Calcium 8.6 8.5 - 10.1 MG/DL Bilirubin, total 1.0 0.2 - 1.0 MG/DL  
 ALT (SGPT) 26 12 - 78 U/L  
 AST (SGOT) 9 (L) 15 - 37 U/L Alk. phosphatase 66 45 - 117 U/L Protein, total 7.5 6.4 - 8.2 g/dL Albumin 3.5 3.5 - 5.0 g/dL Globulin 4.0 2.0 - 4.0 g/dL A-G Ratio 0.9 (L) 1.1 - 2.2 POC LACTIC ACID Collection Time: 05/12/19  5:14 PM  
Result Value Ref Range  Lactic Acid (POC) 0.90 0.40 - 2.00 mmol/L

## 2019-05-12 NOTE — PROGRESS NOTES
Pharmacy Automatic Renal Dosing Protocol - Antimicrobials Indication for Antimicrobials: bacteremia Current Regimen of Each Antimicrobial: 
Vancomycin 2g load, then 1.5g q8h (Start Date ; Day # 1) Previous Antimicrobial Therapy: 
Cefepime 2g once on  LEvaquin 750mg once on  Goal Level: VANCOMYCIN TROUGH GOAL RANGE Vancomycin Trough: 15 - 20 mcg/mL Date Dose & Interval Measured (mcg/mL) Extrapolated (mcg/mL) Date & time of next level: prior to 1900 dose on  Significant Cultures:  
NA 
 
Radiology / Imaging results: (X-ray, CT scan or MRI): NA 
 
Paralysis, amputations, malnutrition: NA 
 
Labs: 
Recent Labs 19 
1709 CREA 1.01  
BUN 11 WBC 0.9* Temp (24hrs), Av.7 °F (38.2 °C), Min:100.7 °F (38.2 °C), Max:100.7 °F (38.2 °C) Creatinine Clearance (mL/min) or Dialysis: 100+ Impression/Plan:  
Vancomycin 2g load, then 1.25g q8h. Projected trough level of 17.4. Goal trough level between 15-20. Antimicrobial stop date to be determined. Pharmacy will follow daily and adjust medications as appropriate for renal function and/or serum levels. Thank you, 
Sylvia Harris, [facility] Recommended duration of therapy 
[email or web address]/[place]/[facility]/Pharmacy/Clinical%20Companion/[email or web address]. docx Renal Dosing 
[email or web address]/[place]/[facility]/Pharmacy/Clinical%20Companion/Renal%[id number]. pdf

## 2019-05-13 ENCOUNTER — APPOINTMENT (OUTPATIENT)
Dept: CT IMAGING | Age: 34
DRG: 872 | End: 2019-05-13
Attending: EMERGENCY MEDICINE
Payer: COMMERCIAL

## 2019-05-13 LAB
ALBUMIN SERPL-MCNC: 3 G/DL (ref 3.5–5)
ALBUMIN/GLOB SERPL: 0.8 {RATIO} (ref 1.1–2.2)
ALP SERPL-CCNC: 57 U/L (ref 45–117)
ALT SERPL-CCNC: 22 U/L (ref 12–78)
ANION GAP SERPL CALC-SCNC: 6 MMOL/L (ref 5–15)
AST SERPL-CCNC: 6 U/L (ref 15–37)
ATRIAL RATE: 102 BPM
BASOPHILS # BLD: 0 K/UL (ref 0–0.1)
BASOPHILS NFR BLD: 1 % (ref 0–1)
BILIRUB SERPL-MCNC: 1 MG/DL (ref 0.2–1)
BUN SERPL-MCNC: 10 MG/DL (ref 6–20)
BUN/CREAT SERPL: 12 (ref 12–20)
CALCIUM SERPL-MCNC: 8.3 MG/DL (ref 8.5–10.1)
CALCULATED P AXIS, ECG09: 46 DEGREES
CALCULATED T AXIS, ECG11: 9 DEGREES
CHLORIDE SERPL-SCNC: 107 MMOL/L (ref 97–108)
CO2 SERPL-SCNC: 24 MMOL/L (ref 21–32)
CREAT SERPL-MCNC: 0.86 MG/DL (ref 0.7–1.3)
DIAGNOSIS, 93000: NORMAL
DIFFERENTIAL METHOD BLD: ABNORMAL
EOSINOPHIL # BLD: 0 K/UL (ref 0–0.4)
EOSINOPHIL NFR BLD: 2 % (ref 0–7)
ERYTHROCYTE [DISTWIDTH] IN BLOOD BY AUTOMATED COUNT: 11.4 % (ref 11.5–14.5)
GLOBULIN SER CALC-MCNC: 3.9 G/DL (ref 2–4)
GLUCOSE SERPL-MCNC: 90 MG/DL (ref 65–100)
HCT VFR BLD AUTO: 32.3 % (ref 36.6–50.3)
HGB BLD-MCNC: 11.6 G/DL (ref 12.1–17)
IMM GRANULOCYTES # BLD AUTO: 0 K/UL (ref 0–0.04)
IMM GRANULOCYTES NFR BLD AUTO: 0 % (ref 0–0.5)
LYMPHOCYTES # BLD: 0.8 K/UL (ref 0.8–3.5)
LYMPHOCYTES NFR BLD: 62 % (ref 12–49)
MCH RBC QN AUTO: 30.7 PG (ref 26–34)
MCHC RBC AUTO-ENTMCNC: 35.9 G/DL (ref 30–36.5)
MCV RBC AUTO: 85.4 FL (ref 80–99)
MONOCYTES # BLD: 0.3 K/UL (ref 0–1)
MONOCYTES NFR BLD: 25 % (ref 5–13)
NEUTS SEG # BLD: 0.1 K/UL (ref 1.8–8)
NEUTS SEG NFR BLD: 10 % (ref 32–75)
NRBC # BLD: 0 K/UL (ref 0–0.01)
NRBC BLD-RTO: 0 PER 100 WBC
P-R INTERVAL, ECG05: 146 MS
PATH REV BLD -IMP: NORMAL
PLATELET # BLD AUTO: 105 K/UL (ref 150–400)
PMV BLD AUTO: 9.2 FL (ref 8.9–12.9)
POTASSIUM SERPL-SCNC: 4.1 MMOL/L (ref 3.5–5.1)
PROT SERPL-MCNC: 6.9 G/DL (ref 6.4–8.2)
Q-T INTERVAL, ECG07: 308 MS
QRS DURATION, ECG06: 82 MS
QTC CALCULATION (BEZET), ECG08: 401 MS
RBC # BLD AUTO: 3.78 M/UL (ref 4.1–5.7)
RBC MORPH BLD: ABNORMAL
SODIUM SERPL-SCNC: 137 MMOL/L (ref 136–145)
VENTRICULAR RATE, ECG03: 102 BPM
WBC # BLD AUTO: 1.2 K/UL (ref 4.1–11.1)

## 2019-05-13 PROCEDURE — 85025 COMPLETE CBC W/AUTO DIFF WBC: CPT

## 2019-05-13 PROCEDURE — 74011250636 HC RX REV CODE- 250/636: Performed by: EMERGENCY MEDICINE

## 2019-05-13 PROCEDURE — 74011250637 HC RX REV CODE- 250/637: Performed by: INTERNAL MEDICINE

## 2019-05-13 PROCEDURE — 70491 CT SOFT TISSUE NECK W/DYE: CPT

## 2019-05-13 PROCEDURE — 65270000015 HC RM PRIVATE ONCOLOGY

## 2019-05-13 PROCEDURE — 74011000258 HC RX REV CODE- 258: Performed by: INTERNAL MEDICINE

## 2019-05-13 PROCEDURE — 80053 COMPREHEN METABOLIC PANEL: CPT

## 2019-05-13 PROCEDURE — 74011000250 HC RX REV CODE- 250: Performed by: EMERGENCY MEDICINE

## 2019-05-13 PROCEDURE — 36415 COLL VENOUS BLD VENIPUNCTURE: CPT

## 2019-05-13 PROCEDURE — 74011636320 HC RX REV CODE- 636/320: Performed by: EMERGENCY MEDICINE

## 2019-05-13 PROCEDURE — 74011250636 HC RX REV CODE- 250/636: Performed by: INTERNAL MEDICINE

## 2019-05-13 RX ORDER — SODIUM CHLORIDE 0.9 % (FLUSH) 0.9 %
10 SYRINGE (ML) INJECTION
Status: COMPLETED | OUTPATIENT
Start: 2019-05-13 | End: 2019-05-13

## 2019-05-13 RX ADMIN — VANCOMYCIN HYDROCHLORIDE 1250 MG: 10 INJECTION, POWDER, LYOPHILIZED, FOR SOLUTION INTRAVENOUS at 20:30

## 2019-05-13 RX ADMIN — CEFEPIME HYDROCHLORIDE 2 G: 2 INJECTION, POWDER, FOR SOLUTION INTRAVENOUS at 19:48

## 2019-05-13 RX ADMIN — VANCOMYCIN HYDROCHLORIDE 1250 MG: 10 INJECTION, POWDER, LYOPHILIZED, FOR SOLUTION INTRAVENOUS at 04:06

## 2019-05-13 RX ADMIN — CEFEPIME HYDROCHLORIDE 2 G: 2 INJECTION, POWDER, FOR SOLUTION INTRAVENOUS at 02:28

## 2019-05-13 RX ADMIN — ENOXAPARIN SODIUM 40 MG: 40 INJECTION SUBCUTANEOUS at 19:48

## 2019-05-13 RX ADMIN — IOPAMIDOL 100 ML: 755 INJECTION, SOLUTION INTRAVENOUS at 19:16

## 2019-05-13 RX ADMIN — LEVOFLOXACIN 750 MG: 5 INJECTION, SOLUTION INTRAVENOUS at 20:29

## 2019-05-13 RX ADMIN — CEFEPIME HYDROCHLORIDE 2 G: 2 INJECTION, POWDER, FOR SOLUTION INTRAVENOUS at 09:31

## 2019-05-13 RX ADMIN — VANCOMYCIN HYDROCHLORIDE 1250 MG: 10 INJECTION, POWDER, LYOPHILIZED, FOR SOLUTION INTRAVENOUS at 12:00

## 2019-05-13 RX ADMIN — Medication 10 ML: at 19:16

## 2019-05-13 RX ADMIN — PROCHLORPERAZINE EDISYLATE 10 MG: 5 INJECTION INTRAMUSCULAR; INTRAVENOUS at 13:32

## 2019-05-13 RX ADMIN — Medication 10 ML: at 14:00

## 2019-05-13 NOTE — PROGRESS NOTES
Problem: Risk for Spread of Infection Goal: Prevent transmission of infectious organism to others Description Prevent the transmission of infectious organisms to other patients, staff members, and visitors. Outcome: Progressing Towards Goal 
  
Problem: Neutropenic Fever: Day 2 Goal: Activity/Safety Outcome: Progressing Towards Goal 
Pt educated how to move with IV pump. Goal: Diagnostic Test/Procedures Outcome: Progressing Towards Goal 
Daily lab values Goal: Medications Outcome: Progressing Towards Goal 
Continue ABX

## 2019-05-13 NOTE — PROGRESS NOTES
Reason for Admission:   Neutro Penic Fever ([address] RRAT Score:        3 Plan for utilizing home health:      [facility] in the past (2016). No prior SNF in the past. Pt is independent w/ADL's, drives, and requires no assistance. Current Advanced Directive/Advance Care Plan:   FULL code. No POA. Working on completing [address] [facility]. Voiced his mother Angelina Oakley [phone] is his decision maker. Likelihood of Readmission:  Low Transition of Care Plan:  Pt hasn't seen PCP listed in over five years. \"In the market for a new one\". Pt recently quit his job last week; and is unsure if his insurance is still active. SW to contact patient registration. Pt has been provided the Care Card & Medicaid (x 2) application twice. Pt receives Chemotherapy M-F; M (only); M (only); then M-F. Oncologist is Dr. Tolu Dupont; and receives Chemo tx @ [address] [place] [address]. 1) Provide pt w/listing of [facility] PCP offices w/availability. Get an appt scheduled. 2) Pt to continue following Oncology tx w/Dr. Tolu Dupont. SW to continue to assist.   
 
Care Management Interventions PCP Verified by CM: Yes(Hasn't seen PCP in over 5 years.  ) Mode of Transport at Discharge: Other (see comment)(Family.) Transition of Care Consult (CM Consult): Discharge Planning Discharge Durable Medical Equipment: (No O2 or DME. ) Current Support Network: Relative's Home(Lives w/his mom in ranch style home. There are five steps to enter the front door. ) Confirm Follow Up Transport: Family Plan discussed with Pt/Family/Caregiver: Yes Discharge Location Discharge Placement: (Home) Mickey Romero, MSW 
[phone]

## 2019-05-13 NOTE — ACP (ADVANCE CARE PLANNING)
Responded to in-basket request to assist with advance medical directive. Explained document to patient and Beto Castaneda, who were present in the room. Patient's next of kin is his mother. He has never had a conversation with her regarding his wishes for end of life care. Left document with him for review. Advised him to have a nurse page a  if he wants to complete the document during admission.      TIFFANIE Boyer, [facility], [address]    [address] Paging Service  [phone])

## 2019-05-13 NOTE — PROGRESS NOTES
Problem: Risk for Spread of Infection Goal: Prevent transmission of infectious organism to others Description Prevent the transmission of infectious organisms to other patients, staff members, and visitors. Outcome: Progressing Towards Goal 
  
Problem: Patient Education:  Go to Education Activity Goal: Patient/Family Education Outcome: Progressing Towards Goal 
  
Problem: Falls - Risk of 
Goal: *Absence of Falls Description Document Sandy Latin Fall Risk and appropriate interventions in the flowsheet. Outcome: Progressing Towards Goal 
  
Problem: Patient Education: Go to Patient Education Activity Goal: Patient/Family Education Outcome: Progressing Towards Goal

## 2019-05-13 NOTE — PROGRESS NOTES
Music Therapy Assessment [address] Pari Arteaga [id number]    
1985  29 y.o.  male Patient Telephone Number: [phone] (home) Taoism Affiliation: Jehovah's witness Language: [place] Patient Active Problem List  
 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 02/24/2011 Date: 5/13/2019            Total Time (in minutes): 10          [facility] 1 MEDICAL ONCOLOGY Mental Status:   [x] Alert [  ] Yin Shackleton [  ]  Confused  [  ] Minimally responsive Communication Status: [  ] Impaired Speech [  ] Nonverbal -N/A Physical Status:   [  ] Oxygen in use  [  ] Hard of Hearing [  ] Vision Impaired [  ] Ambulatory  [  ] Ambulatory with assistance [  ] Non-ambulatory -N/A Music Preferences, Background: Heavy Metal and Classic Rock, including [place] [employer], AC/DC, Five Finger Death Punch. Pt's fiance shared that pt likes to sing Country when he sings karaoke, though pt was shy to admit this. Clinical Problem addressed: Support healthy coping. Goal(s) met in session: 
Physical/Pain management (Scale of 1-10): Pre-session rating: Pt denied pain. Post-session rating: Pt denied pain. [  ] Increased relaxation   [  ] Regulated breathing patterns [  ] Decreased muscle tension   [  ] Minimized physical distress Emotional/Psychological: 
[x] Increased self-expression   [  ] Decreased aggressive behavior [  ] Decreased sadness   [  ] Discussed healthy coping skills [  ] Improved mood    [  ] Decreased withdrawn behavior Social: 
[  ] Decreased feelings of isolation/loneliness [x] Positive social interaction [  ] Provided support and/or comfort for family/friends Spiritual: 
[  ] Spiritual support    [  ] Expressed peace [  ] Expressed enedina    [  ] Discussed beliefs Techniques Utilized (Check all that apply):  
[  ] Procedural support MT [  ] Music for relaxation [  ] Patient preferred music 
[  ] Negin analysis  [  ] Song choice  [  ] Music for validation [  ] Entrainment  [  ] Progressive muscle relax. [  ] Guided visualization [  ] Sania Meagan  [  ] Patient instrument playing [  ] Elvis Gutter writing [  ] Wanda Benitez along   [  ] Namon Going  [  ] Sensory stimulation 
[x] Active Listening  [  ] Music for spiritual support [  ] Making of CDs as gifts Session Observations:  Referral from Ignacio Lyles, Nurse Navigator. Patient (pt) was alert lying in bed and his fiance Ajith Solis was at bedside. Pt increased self-expression in response to this music therapist and music therapy intern (MT team) asking about how he was feeling. MT team then asked pt about his music preferences and pt shared these. MT team provided active listening, and then briefly explained role. Pt declined hearing music today. He accepted a hand out with recommendations for using music for self-care and coping that could be applied now and after he is discharged. He also accepted a songwriting template to potentially use as a writing prompt for journaling. He and his fiance thanked MT team for the visit. Will follow as able. BHUPINDER BeckmanBC (Music Therapist-Board Certified) 
and 
Cindy \"Vanesa\" aSrah, Music Therapy Intern Spiritual Care Department Referral-based service

## 2019-05-13 NOTE — PROGRESS NOTES
Oncology End of Shift Note Bedside shift change report given to JENNIFER Morrison (incoming nurse) by Rom Sanchez RN (outgoing nurse) on Bri Amour. Report included the following information SBAR. Shift Summary:  
Pt continued ABX all shift. Added compazine to manage nausea. Pt appetite improving. Imaging done of neck late in shift Issues for Physician to Address:   
 
Patient on Cardiac Monitoring?    
[] Yes 
[x] No 
 
Rhythm:   
 
 
 
 
Rom Sanchez RN

## 2019-05-13 NOTE — PROGRESS NOTES
Oncology End of Shift Note Bedside shift change report given to Chi Pressley RN (incoming nurse) by Kathy Borges RN (outgoing nurse) on Roselia Araujo. Report included the following information SBAR, Kardex and MAR. Shift Summary: Pt had a fever and was unable to swallow tylenol, got a one time dose of ofirmev and compazine, fever came down, no more episodes of vomiting Issues for Physician to Address:  Is it ok to access port, infusion center did not get blood return last time Patient on Cardiac Monitoring? [] Yes 
[x] No 
 
Rhythm:   
 
 
 
Shift Events Kathy Borges RN

## 2019-05-13 NOTE — CONSULTS
[address] [facility]  [address], [address], [address] [facility]  [phone]       Oncology Inpatient Consult Note      Patient: Ambreen Higgins MRN: [id number]  SSN: [id number]    YOB: 1985  Age: 29 y.o. Sex: male        Diagnosis:      1. 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 Right sided radical orchiectomy  2. Systemic chemotherapy,   BEP - Cycle 1 Day 15    Subjective:      Ambreen Higgins is a 29 y.o. male who we were asked to see by Dr. Patrick Eller for neutropenic fever. He was diagnosed with testicular carcinoma earlier this year. He underwent a right radical orchiectomy on 03/27/2019. The pathology showed T1c disease. A CT scan was done on 04/03/2019 which revealed metastatic disease. He is currently undergoing systemic chemotherapy with BEP. He presented to the ED yesterday with complaints of vomiting and fever. He was admitted for further evaluation and management. He has been afebrile today and feels better. Review of Systems:    Constitutional: negative  Eyes: negative  Ears, Nose, Mouth, Throat, and Face: negative  Respiratory: negative  Cardiovascular: negative  Gastrointestinal: negative  Genitourinary:negative  Integument/Breast: negative  Hematologic/Lymphatic: negative  Musculoskeletal:negative  Neurological: negative      Past Medical History:   Diagnosis Date    Anxiety disorder     Depression     Suicidal thoughts     Testicle cancer ([facility]      Past Surgical History:   Procedure Laterality Date    HX ORTHOPAEDIC      lft knee surgery    HX ORTHOPAEDIC      right shoulder/right knee    IR INSERT TUNL CVC W PORT OVER 5 YEARS  4/18/2019      No family history on file.   Social History     Tobacco Use    Smoking status: Current Every Day Smoker     Packs/day: 0.50    Smokeless tobacco: Former User Substance Use Topics    Alcohol use: Yes     Comment: per pt \"once a weekend 6 or 7 beers\"      Prior to Admission medications    Medication Sig Start Date End Date Taking? Authorizing Provider   prochlorperazine (COMPAZINE) 10 mg tablet Take 5 mg by mouth every six (6) hours as needed for Nausea. Yes Provider, Historical   ondansetron (ZOFRAN ODT) 4 mg disintegrating tablet Take 1 Tab by mouth every eight (8) hours as needed for Nausea. 4/8/19  Yes Janae Sanders, NP   lidocaine-prilocaine (EMLA) topical cream Apply  to affected area as needed for Pain. 4/8/19  Yes Lashell CHEEK, VENUS              No Known Allergies        Objective:     Vitals:    05/12/19 2324 05/13/19 0405 05/13/19 0741 05/13/19 1631   BP: 136/71  141/73 146/84   Pulse: 93  69 89   Resp: 16  16 16   Temp: (!) 101.6 °F (38.7 °C) 98.2 °F (36.8 °C) 98.8 °F (37.1 °C) 98.2 °F (36.8 °C)   SpO2: 98%  95% 99%   Weight:       Height:              Physical Exam:    GENERAL: alert, cooperative, no distress, appears stated age  EYE: conjunctivae/corneas clear. PERRL, EOM's intact. Fundi benign  LYMPHATIC: Cervical, supraclavicular, and axillary nodes normal.   THROAT & NECK: normal and no erythema or exudates noted. LUNG: clear to auscultation bilaterally  HEART: regular rate and rhythm, S1, S2 normal, no murmur, click, rub or gallop  ABDOMEN: soft, non-tender. Bowel sounds normal. No masses,  no organomegaly  EXTREMITIES:  extremities normal, atraumatic, no cyanosis or edema  SKIN: extensive tattoo on the torso and arms  NEUROLOGIC: AOx3. Gait normal. Reflexes and motor strength normal and symmetric. Cranial nerves 2-12 and sensation grossly intact. CT Results (most recent):  Results from Hospital Encounter encounter on 04/03/19   CT CHEST W CONT    Narrative INDICATION: Testicular cancer removed 3/27/2019. COMPARISON: CT abdomen pelvis 9/30/2013 and CTA thorax 12/15/2010.     TECHNIQUE:  Following the uneventful intravenous administration of 100 cc  Isovue-300, 5 mm axial images were obtained through the chest, abdomen, and  pelvis. Oral contrast administered. Coronal and sagittal reconstructions were  generated. CT dose reduction was achieved through use of a standardized protocol  tailored for this examination and automatic exposure control for dose  modulation. FINDINGS:    THYROID: No nodule. MEDIASTINUM: No mass or lymphadenopathy. EVE: No mass or lymphadenopathy. THORACIC AORTA: No dissection or aneurysm. MAIN PULMONARY ARTERY: Normal in caliber. TRACHEA/BRONCHI: Patent. ESOPHAGUS: No wall thickening or dilatation. HEART: Normal in size. PLEURA: No effusion or pneumothorax. LUNGS: No nodule, mass, or airspace disease. LIVER: Unremarkable. GALLBLADDER: Unremarkable. SPLEEN: Unremarkable. PANCREAS: No mass or duct dilation. ADRENALS: Unremarkable. KIDNEYS: No mass, calculus, or hydronephrosis. STOMACH: Unremarkable. SMALL BOWEL: No dilatation or wall thickening. COLON: No dilation or wall thickening. APPENDIX: Unremarkable. PERITONEUM: No ascites or pneumoperitoneum. RETROPERITONEUM: There is an enlarged aortocaval lymph node measuring 2.7 x 3.6  x 4.2 cm (2, 90). No other enlarged adenopathy. No aneurysm or dissection. REPRODUCTIVE ORGANS: The seminal vesicles and prostate appear unremarkable. URINARY BLADDER: No mass or calculus. BONES: No acute fracture or aggressive lesion. Partial visualization of  intramedullary canal with 2 interlocking screws in the proximal right femur. ADDITIONAL COMMENTS: There is stranding in the right inguinal canal without  evident mass or suspicious collection. Impression IMPRESSION: Enlarged aortocaval retroperitoneal adenopathy suspicious for  metastatic testicular neoplasm. No other sites of metastatic disease identified  within the thorax, abdomen, or pelvis with postoperative changes along the right  inguinal canal noted.     23X       Lab Results   Component Value Date/Time WBC 1.2 (L) 05/13/2019 04:08 AM    HGB 11.6 (L) 05/13/2019 04:08 AM    HCT 32.3 (L) 05/13/2019 04:08 AM    PLATELET 187 (L) 75/56/9104 04:08 AM    MCV 85.4 05/13/2019 04:08 AM       Lab Results   Component Value Date/Time    Sodium 137 05/13/2019 04:08 AM    Potassium 4.1 05/13/2019 04:08 AM    Chloride 107 05/13/2019 04:08 AM    CO2 24 05/13/2019 04:08 AM    Anion gap 6 05/13/2019 04:08 AM    Glucose 90 05/13/2019 04:08 AM    BUN 10 05/13/2019 04:08 AM    Creatinine 0.86 05/13/2019 04:08 AM    BUN/Creatinine ratio 12 05/13/2019 04:08 AM    GFR est AA >60 05/13/2019 04:08 AM    GFR est non-AA >60 05/13/2019 04:08 AM    Calcium 8.3 (L) 05/13/2019 04:08 AM    Bilirubin, total 1.0 05/13/2019 04:08 AM    AST (SGOT) 6 (L) 05/13/2019 04:08 AM    Alk. phosphatase 57 05/13/2019 04:08 AM    Protein, total 6.9 05/13/2019 04:08 AM    Albumin 3.0 (L) 05/13/2019 04:08 AM    Globulin 3.9 05/13/2019 04:08 AM    A-G Ratio 0.8 (L) 05/13/2019 04:08 AM    ALT (SGPT) 22 05/13/2019 04:08 AM           Assessment:     1. 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  Neutropenic fever    > Continue antibiotics  > Granix 480 mcg SQ x 2 days  > Discharge home on po levaquin  > Okay to discharge when [address] > 0.3      Plan:     > Continue antibiotics  > Granix x 2 days  > Delay chemotherapy 1 week  > Discharge home on oral antibiotics  > Okay for discharge when ANC > 0.3      Signed By: Meenakshi Alba NP     May 13, 2019

## 2019-05-13 NOTE — PROGRESS NOTES
Hospitalist Progress Note NAME: Jovanny Veronica :  1985 MRN:  [id number] Assessment / Plan: 
 
Sepsis/Neutropenic Fever POA In settings of metastatic testicular cancer on chemo therapy R/o Line infection (have port for chemo) Admit IVF IV Vancomycin, Cefepime and Levaquin Hematology consult F/u blood cultures CXR clear UA neg, denies any urinary complaints 
-Patient has no localizing symptoms for infection other than his mouth and submandibular/R neck discomfort. -  We will check a CT scan of his neck with contrast to rule out possible abscess. The study was discussed with radiology to assure we can see the mouth and the neck area given his symptoms. 
-Patient has been started on Granix and will be continued on broad-spectrum antibiotics until his cultures are back and his white count has improved. -Appreciate hematology evaluation. Nausea/Vomiting Due to chemo/sepsis PRN IV Zofran Abdomen is benign If continued then may warrant further workup 
  
Smoker Offered nicotine patch 
  
Code Status: Full Surrogate Decision Maker: Parents 
  
DVT Prophylaxis: Lovenox 
  
Baseline: functional 
 
30.0 - 39.9 Obese / Body mass index is 39.69 kg/m². Recommended Disposition: Home w/Family Subjective: Chief Complaint / Reason for Physician Visit 
fever Patient feels better. He says he has been having symptoms since the day after his chemo he has had mouth pain and some trouble with pain when he swallows that is been progressive. Poor p.o. intake. He denies cough or shortness of breath. No chest pain no urinary symptoms. No abdominal pain but has been having nausea. No prior diarrhea but some loose stool today. Currently his nausea is controlled on recent Compazine. Patient was evaluated at 3 PM 
 
Discussed with RN events overnight. Review of Systems: 
Symptom Y/N Comments  Symptom Y/N Comments Fever/Chills    Chest Pain n   
 Poor Appetite y   Edema Cough n   Abdominal Pain n   
Sputum n   Joint Pain SOB/HUSAIN n   Pruritis/Rash Nausea/vomit y   Tolerating PT/OT Diarrhea  Loose this am  Tolerating Diet n   
Constipation n   Other y Mouth pain Could NOT obtain due to:   
 
Objective: VITALS:  
Last 24hrs VS reviewed since prior progress note. Most recent are: 
Patient Vitals for the past 24 hrs: 
 Temp Pulse Resp BP SpO2  
05/13/19 1631 98.2 °F (36.8 °C) 89 16 146/84 99 % 05/13/19 0741 98.8 °F (37.1 °C) 69 16 141/73 95 % 05/13/19 0405 98.2 °F (36.8 °C)      
05/12/19 2324 (!) 101.6 °F (38.7 °C) 93 16 136/71 98 % 05/12/19 2203 100 °F (37.8 °C)      
05/12/19 1948 99.2 °F (37.3 °C) 81 16 141/87 99 % 05/12/19 1901 100 °F (37.8 °C) 96 15 131/73 99 % Intake/Output Summary (Last 24 hours) at 5/13/2019 1816 Last data filed at 5/12/2019 1842 Gross per 24 hour Intake 100 ml Output  Net 100 ml PHYSICAL EXAM: 
Patient is awake and alert nontoxic-appearing on room air no distress. Conjunctiva slightly pale mucous membranes are tacky I did not appreciate any lesions. Neck is supple but he is tender on the right anterior cervical and submandibular area on exam cardiovascular regular rate no murmurs rubs or gallops lungs are clear no wheezes rhonchi or crackles. Abdomen bowel sounds present soft nontender extremities no clubbing cyanosis or edema no open lesions. Reviewed most current lab test results and cultures  YES Reviewed most current radiology test results   YES Review and summation of old records today    NO Reviewed patient's current orders and MAR    YES 
PMH/SH reviewed - no change compared to H&P 
________________________________________________________________________ Care Plan discussed with: 
  Comments Patient y Family  y   
RN y   
Care Manager Consultant  y radiology                   Multidiciplinary team rounds were held today with case manager, nursing, pharmacist and clinical coordinator. Patient's plan of care was discussed; medications were reviewed and discharge planning was addressed. ________________________________________________________________________ Total NON critical care TIME:    Minutes Total CRITICAL CARE TIME Spent:   Minutes non procedure based Comments >50% of visit spent in counseling and coordination of care    
________________________________________________________________________ Abimael Dougherty MD  
 
Procedures: see electronic medical records for all procedures/Xrays and details which were not copied into this note but were reviewed prior to creation of Plan. LABS: 
I reviewed today's most current labs and imaging studies. Pertinent labs include: 
Recent Labs 05/13/19 
0408 05/12/19 
1709 WBC 1.2* 0.9* HGB 11.6* 12.3 HCT 32.3* 34.1*  
* 108* Recent Labs 05/13/19 
0408 05/12/19 
1709  135* K 4.1 3.8  103 CO2 24 25 GLU 90 96 BUN 10 11 CREA 0.86 1.01  
CA 8.3* 8.6 ALB 3.0* 3.5 TBILI 1.0 1.0 SGOT 6* 9* ALT 22 26 Signed: Abimael Dougherty MD

## 2019-05-13 NOTE — PROGRESS NOTES
Spiritual Care Assessment/Progress Note [address] 
 
 
NAME: Aylin Rubio      MRN: [id number] AGE: 29 y.o. SEX: male Lutheran Affiliation: Sabianism Language: [place] 5/13/2019     Total Time (in minutes): 29 Spiritual Assessment begun in [facility] 1 MEDICAL ONCOLOGY through conversation with: 
  
    [x]Patient        [] Family    [] Friend(s) Reason for Consult: Advance medical directive consult Spiritual beliefs: (Please include comment if needed) [x] Identifies with a enedina tradition:    Sabianism 
   [] Supported by a enedina community:        
   [] Claims no spiritual orientation:       
   [] Seeking spiritual identity:            
   [] Adheres to an individual form of spirituality:       
   [] Not able to assess:                   
 
    
Identified resources for coping:  
   [x] Prayer                           
   [] Music                  [] Guided Imagery [x] Family/friends                 [] Pet visits [] Devotional reading                         [] Unknown 
   [] Other:                                          
 
 
Interventions offered during this visit: (See comments for more details) Patient Interventions: Advance medical directive consult, Affirmation of emotions/emotional suffering, Affirmation of enedina, Catharsis/review of pertinent events in supportive environment, Coping skills reviewed/reinforced, Iconic (affirming the presence of God/Higher Power), Initial/Spiritual assessment, patient floor, Normalization of emotional/spiritual concerns, Prayer (assurance of), Other (comment)(grief concerns) Family/Friend(s): Advance medical directive consult Plan of Care: 
 
 [x] Support spiritual and/or cultural needs [x] Support AMD and/or advance care planning process    
 [] Support grieving process 
 [] Coordinate Rites and/or Rituals  
 [] Coordination with community clergy [] No spiritual needs identified at this time [] Detailed Plan of Care below (See Comments)  [] Make referral to Music Therapy 
[] Make referral to Pet Therapy    
[] Make referral to Addiction services 
[] Make referral to [facility] 
[] Make referral to Spiritual Care Partner 
[] No future visits requested       
[x] Follow up visits as needed Comments:  Responded to in-basket request to assist with advance medical directive. Explained document to patient and Daysi Brody, who were present in the room. Patient's next of kin is his mother. He has never had a conversation with her regarding his wishes for end of life care. Left document with him for review. Advised him to have a nurse page a  if he wants to complete the document during admission. Provided empathic listening as Yasir Saini shared that  has been a difficult year. His father  in January; he coped using alcohol and indicated he feels he still has not grieved adequately. Three months later, Yasir Saini was diagnosed with cancer. He was told he should not drink; he indicated he has stopped, but continues to smoke. He expressed that smoking gives him a few minutes during which time he does not dwell on his challenges. Yasir Saini shared that Grow visited today. He seemed to have enjoyed that, especially when he learned the  had known his dad. Encouraged Yasir Saini to seek out a support group. Provided pastoral presence and assurance of prayer. Sylvan Prader, [facility], [address],  [facility]  Paging Service  [phone])

## 2019-05-14 LAB
ANION GAP SERPL CALC-SCNC: 6 MMOL/L (ref 5–15)
BASOPHILS # BLD: 0 K/UL (ref 0–0.1)
BASOPHILS NFR BLD: 0 % (ref 0–1)
BUN SERPL-MCNC: 7 MG/DL (ref 6–20)
BUN/CREAT SERPL: 9 (ref 12–20)
CALCIUM SERPL-MCNC: 8.3 MG/DL (ref 8.5–10.1)
CHLORIDE SERPL-SCNC: 105 MMOL/L (ref 97–108)
CO2 SERPL-SCNC: 25 MMOL/L (ref 21–32)
CREAT SERPL-MCNC: 0.8 MG/DL (ref 0.7–1.3)
DATE LAST DOSE: ABNORMAL
DIFFERENTIAL METHOD BLD: ABNORMAL
EOSINOPHIL # BLD: 0 K/UL (ref 0–0.4)
EOSINOPHIL NFR BLD: 1 % (ref 0–7)
ERYTHROCYTE [DISTWIDTH] IN BLOOD BY AUTOMATED COUNT: 11.7 % (ref 11.5–14.5)
GLUCOSE SERPL-MCNC: 94 MG/DL (ref 65–100)
HCT VFR BLD AUTO: 30.6 % (ref 36.6–50.3)
HGB BLD-MCNC: 10.9 G/DL (ref 12.1–17)
IMM GRANULOCYTES # BLD AUTO: 0 K/UL (ref 0–0.04)
IMM GRANULOCYTES NFR BLD AUTO: 1 % (ref 0–0.5)
LYMPHOCYTES # BLD: 0.7 K/UL (ref 0.8–3.5)
LYMPHOCYTES NFR BLD: 45 % (ref 12–49)
MAGNESIUM SERPL-MCNC: 1.9 MG/DL (ref 1.6–2.4)
MCH RBC QN AUTO: 30.4 PG (ref 26–34)
MCHC RBC AUTO-ENTMCNC: 35.6 G/DL (ref 30–36.5)
MCV RBC AUTO: 85.2 FL (ref 80–99)
MONOCYTES # BLD: 0.6 K/UL (ref 0–1)
MONOCYTES NFR BLD: 39 % (ref 5–13)
NEUTS SEG # BLD: 0.2 K/UL (ref 1.8–8)
NEUTS SEG NFR BLD: 14 % (ref 32–75)
NRBC # BLD: 0 K/UL (ref 0–0.01)
NRBC BLD-RTO: 0 PER 100 WBC
PHOSPHATE SERPL-MCNC: 2.7 MG/DL (ref 2.6–4.7)
PLATELET # BLD AUTO: 136 K/UL (ref 150–400)
PMV BLD AUTO: 9.3 FL (ref 8.9–12.9)
POTASSIUM SERPL-SCNC: 3.8 MMOL/L (ref 3.5–5.1)
RBC # BLD AUTO: 3.59 M/UL (ref 4.1–5.7)
RBC MORPH BLD: ABNORMAL
REPORTED DOSE,DOSE: ABNORMAL UNITS
REPORTED DOSE/TIME,TMG: 2200
SODIUM SERPL-SCNC: 136 MMOL/L (ref 136–145)
VANCOMYCIN TROUGH SERPL-MCNC: 11.2 UG/ML (ref 5–10)
WBC # BLD AUTO: 1.5 K/UL (ref 4.1–11.1)

## 2019-05-14 PROCEDURE — 74011250636 HC RX REV CODE- 250/636: Performed by: INTERNAL MEDICINE

## 2019-05-14 PROCEDURE — 74011250637 HC RX REV CODE- 250/637: Performed by: INTERNAL MEDICINE

## 2019-05-14 PROCEDURE — 74011000258 HC RX REV CODE- 258: Performed by: EMERGENCY MEDICINE

## 2019-05-14 PROCEDURE — 83735 ASSAY OF MAGNESIUM: CPT

## 2019-05-14 PROCEDURE — 65270000015 HC RM PRIVATE ONCOLOGY

## 2019-05-14 PROCEDURE — 85025 COMPLETE CBC W/AUTO DIFF WBC: CPT

## 2019-05-14 PROCEDURE — 74011000258 HC RX REV CODE- 258: Performed by: INTERNAL MEDICINE

## 2019-05-14 PROCEDURE — 80048 BASIC METABOLIC PNL TOTAL CA: CPT

## 2019-05-14 PROCEDURE — 74011250636 HC RX REV CODE- 250/636: Performed by: EMERGENCY MEDICINE

## 2019-05-14 PROCEDURE — 36415 COLL VENOUS BLD VENIPUNCTURE: CPT

## 2019-05-14 PROCEDURE — 84100 ASSAY OF PHOSPHORUS: CPT

## 2019-05-14 PROCEDURE — 80202 ASSAY OF VANCOMYCIN: CPT

## 2019-05-14 RX ORDER — EPINEPHRINE 1 MG/ML
0.3 INJECTION, SOLUTION, CONCENTRATE INTRAVENOUS AS NEEDED
Status: CANCELLED | OUTPATIENT
Start: 2019-06-07

## 2019-05-14 RX ORDER — ONDANSETRON 2 MG/ML
8 INJECTION INTRAMUSCULAR; INTRAVENOUS AS NEEDED
Status: CANCELLED | OUTPATIENT
Start: 2019-06-06

## 2019-05-14 RX ORDER — DIPHENHYDRAMINE HYDROCHLORIDE 50 MG/ML
25 INJECTION, SOLUTION INTRAMUSCULAR; INTRAVENOUS ONCE
Status: CANCELLED
Start: 2019-05-28

## 2019-05-14 RX ORDER — ALBUTEROL SULFATE 0.83 MG/ML
2.5 SOLUTION RESPIRATORY (INHALATION) AS NEEDED
Status: CANCELLED
Start: 2019-05-28

## 2019-05-14 RX ORDER — DIPHENHYDRAMINE HYDROCHLORIDE 50 MG/ML
50 INJECTION, SOLUTION INTRAMUSCULAR; INTRAVENOUS AS NEEDED
Status: CANCELLED
Start: 2019-06-03

## 2019-05-14 RX ORDER — SODIUM CHLORIDE 9 MG/ML
10 INJECTION INTRAMUSCULAR; INTRAVENOUS; SUBCUTANEOUS AS NEEDED
Status: CANCELLED | OUTPATIENT
Start: 2019-06-07

## 2019-05-14 RX ORDER — HEPARIN 100 UNIT/ML
300-500 SYRINGE INTRAVENOUS AS NEEDED
Status: CANCELLED
Start: 2019-06-07

## 2019-05-14 RX ORDER — ACETAMINOPHEN 325 MG/1
650 TABLET ORAL AS NEEDED
Status: CANCELLED
Start: 2019-06-07

## 2019-05-14 RX ORDER — HYDROCORTISONE SODIUM SUCCINATE 100 MG/2ML
100 INJECTION, POWDER, FOR SOLUTION INTRAMUSCULAR; INTRAVENOUS AS NEEDED
Status: CANCELLED | OUTPATIENT
Start: 2019-06-04

## 2019-05-14 RX ORDER — ONDANSETRON 2 MG/ML
8 INJECTION INTRAMUSCULAR; INTRAVENOUS AS NEEDED
Status: CANCELLED | OUTPATIENT
Start: 2019-06-17

## 2019-05-14 RX ORDER — HYDROCORTISONE SODIUM SUCCINATE 100 MG/2ML
100 INJECTION, POWDER, FOR SOLUTION INTRAMUSCULAR; INTRAVENOUS AS NEEDED
Status: CANCELLED | OUTPATIENT
Start: 2019-06-07

## 2019-05-14 RX ORDER — ONDANSETRON 2 MG/ML
8 INJECTION INTRAMUSCULAR; INTRAVENOUS ONCE
Status: CANCELLED | OUTPATIENT
Start: 2019-06-04

## 2019-05-14 RX ORDER — DIPHENHYDRAMINE HYDROCHLORIDE 50 MG/ML
50 INJECTION, SOLUTION INTRAMUSCULAR; INTRAVENOUS AS NEEDED
Status: CANCELLED
Start: 2019-06-07

## 2019-05-14 RX ORDER — EPINEPHRINE 1 MG/ML
0.3 INJECTION, SOLUTION, CONCENTRATE INTRAVENOUS AS NEEDED
Status: CANCELLED | OUTPATIENT
Start: 2019-06-05

## 2019-05-14 RX ORDER — ONDANSETRON 2 MG/ML
8 INJECTION INTRAMUSCULAR; INTRAVENOUS AS NEEDED
Status: CANCELLED | OUTPATIENT
Start: 2019-06-07

## 2019-05-14 RX ORDER — DIPHENHYDRAMINE HYDROCHLORIDE 50 MG/ML
25 INJECTION, SOLUTION INTRAMUSCULAR; INTRAVENOUS ONCE
Status: CANCELLED
Start: 2019-06-17

## 2019-05-14 RX ORDER — SODIUM CHLORIDE 9 MG/ML
25 INJECTION, SOLUTION INTRAVENOUS CONTINUOUS
Status: CANCELLED | OUTPATIENT
Start: 2019-06-05

## 2019-05-14 RX ORDER — HEPARIN 100 UNIT/ML
300-500 SYRINGE INTRAVENOUS AS NEEDED
Status: CANCELLED
Start: 2019-06-05

## 2019-05-14 RX ORDER — DIPHENHYDRAMINE HYDROCHLORIDE 50 MG/ML
50 INJECTION, SOLUTION INTRAMUSCULAR; INTRAVENOUS AS NEEDED
Status: CANCELLED
Start: 2019-06-10

## 2019-05-14 RX ORDER — ACETAMINOPHEN 325 MG/1
650 TABLET ORAL AS NEEDED
Status: CANCELLED
Start: 2019-06-04

## 2019-05-14 RX ORDER — SODIUM CHLORIDE 0.9 % (FLUSH) 0.9 %
10 SYRINGE (ML) INJECTION AS NEEDED
Status: CANCELLED
Start: 2019-06-05

## 2019-05-14 RX ORDER — DIPHENHYDRAMINE HYDROCHLORIDE 50 MG/ML
50 INJECTION, SOLUTION INTRAMUSCULAR; INTRAVENOUS AS NEEDED
Status: CANCELLED
Start: 2019-06-17

## 2019-05-14 RX ORDER — EPINEPHRINE 1 MG/ML
0.3 INJECTION, SOLUTION, CONCENTRATE INTRAVENOUS AS NEEDED
Status: CANCELLED | OUTPATIENT
Start: 2019-06-06

## 2019-05-14 RX ORDER — SODIUM CHLORIDE 0.9 % (FLUSH) 0.9 %
10 SYRINGE (ML) INJECTION AS NEEDED
Status: CANCELLED
Start: 2019-05-28

## 2019-05-14 RX ORDER — SODIUM CHLORIDE 0.9 % (FLUSH) 0.9 %
10 SYRINGE (ML) INJECTION AS NEEDED
Status: CANCELLED
Start: 2019-06-07

## 2019-05-14 RX ORDER — HEPARIN 100 UNIT/ML
300-500 SYRINGE INTRAVENOUS AS NEEDED
Status: CANCELLED
Start: 2019-05-28

## 2019-05-14 RX ORDER — SODIUM CHLORIDE 9 MG/ML
25 INJECTION, SOLUTION INTRAVENOUS CONTINUOUS
Status: CANCELLED | OUTPATIENT
Start: 2019-06-04

## 2019-05-14 RX ORDER — VANCOMYCIN 1.75 GRAM/500 ML IN 0.9 % SODIUM CHLORIDE INTRAVENOUS
1750 EVERY 8 HOURS
Status: DISCONTINUED | OUTPATIENT
Start: 2019-05-14 | End: 2019-05-15

## 2019-05-14 RX ORDER — SODIUM CHLORIDE 9 MG/ML
25 INJECTION, SOLUTION INTRAVENOUS CONTINUOUS
Status: CANCELLED
Start: 2019-06-10

## 2019-05-14 RX ORDER — DIPHENHYDRAMINE HYDROCHLORIDE 50 MG/ML
50 INJECTION, SOLUTION INTRAMUSCULAR; INTRAVENOUS AS NEEDED
Status: CANCELLED
Start: 2019-05-28

## 2019-05-14 RX ORDER — ONDANSETRON 2 MG/ML
8 INJECTION INTRAMUSCULAR; INTRAVENOUS ONCE
Status: CANCELLED | OUTPATIENT
Start: 2019-06-06

## 2019-05-14 RX ORDER — ALBUTEROL SULFATE 0.83 MG/ML
2.5 SOLUTION RESPIRATORY (INHALATION) AS NEEDED
Status: CANCELLED
Start: 2019-06-07

## 2019-05-14 RX ORDER — ONDANSETRON 2 MG/ML
8 INJECTION INTRAMUSCULAR; INTRAVENOUS AS NEEDED
Status: CANCELLED | OUTPATIENT
Start: 2019-06-05

## 2019-05-14 RX ORDER — EPINEPHRINE 1 MG/ML
0.3 INJECTION, SOLUTION, CONCENTRATE INTRAVENOUS AS NEEDED
Status: CANCELLED | OUTPATIENT
Start: 2019-06-03

## 2019-05-14 RX ORDER — ALBUTEROL SULFATE 0.83 MG/ML
2.5 SOLUTION RESPIRATORY (INHALATION) AS NEEDED
Status: CANCELLED
Start: 2019-06-05

## 2019-05-14 RX ORDER — DIPHENHYDRAMINE HYDROCHLORIDE 50 MG/ML
50 INJECTION, SOLUTION INTRAMUSCULAR; INTRAVENOUS AS NEEDED
Status: CANCELLED
Start: 2019-06-04

## 2019-05-14 RX ORDER — SODIUM CHLORIDE 9 MG/ML
10 INJECTION INTRAMUSCULAR; INTRAVENOUS; SUBCUTANEOUS AS NEEDED
Status: CANCELLED | OUTPATIENT
Start: 2019-06-04

## 2019-05-14 RX ORDER — ACETAMINOPHEN 325 MG/1
650 TABLET ORAL ONCE
Status: CANCELLED
Start: 2019-06-17

## 2019-05-14 RX ORDER — DIPHENHYDRAMINE HYDROCHLORIDE 50 MG/ML
25 INJECTION, SOLUTION INTRAMUSCULAR; INTRAVENOUS ONCE
Status: CANCELLED
Start: 2019-06-03

## 2019-05-14 RX ORDER — HYDROCORTISONE SODIUM SUCCINATE 100 MG/2ML
100 INJECTION, POWDER, FOR SOLUTION INTRAMUSCULAR; INTRAVENOUS AS NEEDED
Status: CANCELLED | OUTPATIENT
Start: 2019-06-17

## 2019-05-14 RX ORDER — HYDROCORTISONE SODIUM SUCCINATE 100 MG/2ML
100 INJECTION, POWDER, FOR SOLUTION INTRAMUSCULAR; INTRAVENOUS AS NEEDED
Status: CANCELLED | OUTPATIENT
Start: 2019-05-28

## 2019-05-14 RX ORDER — HYDROCORTISONE SODIUM SUCCINATE 100 MG/2ML
100 INJECTION, POWDER, FOR SOLUTION INTRAMUSCULAR; INTRAVENOUS AS NEEDED
Status: CANCELLED | OUTPATIENT
Start: 2019-06-06

## 2019-05-14 RX ORDER — DIPHENHYDRAMINE HYDROCHLORIDE 50 MG/ML
50 INJECTION, SOLUTION INTRAMUSCULAR; INTRAVENOUS AS NEEDED
Status: CANCELLED
Start: 2019-06-06

## 2019-05-14 RX ORDER — ACETAMINOPHEN 325 MG/1
650 TABLET ORAL ONCE
Status: CANCELLED
Start: 2019-06-10

## 2019-05-14 RX ORDER — SODIUM CHLORIDE 0.9 % (FLUSH) 0.9 %
10 SYRINGE (ML) INJECTION AS NEEDED
Status: CANCELLED
Start: 2019-06-10

## 2019-05-14 RX ORDER — EPINEPHRINE 1 MG/ML
0.3 INJECTION, SOLUTION, CONCENTRATE INTRAVENOUS AS NEEDED
Status: CANCELLED | OUTPATIENT
Start: 2019-06-10

## 2019-05-14 RX ORDER — ONDANSETRON 2 MG/ML
8 INJECTION INTRAMUSCULAR; INTRAVENOUS AS NEEDED
Status: CANCELLED | OUTPATIENT
Start: 2019-06-04

## 2019-05-14 RX ORDER — SODIUM CHLORIDE 0.9 % (FLUSH) 0.9 %
10 SYRINGE (ML) INJECTION AS NEEDED
Status: CANCELLED
Start: 2019-06-04

## 2019-05-14 RX ORDER — ALBUTEROL SULFATE 0.83 MG/ML
2.5 SOLUTION RESPIRATORY (INHALATION) AS NEEDED
Status: CANCELLED
Start: 2019-06-04

## 2019-05-14 RX ORDER — SODIUM CHLORIDE 9 MG/ML
25 INJECTION, SOLUTION INTRAVENOUS CONTINUOUS
Status: CANCELLED
Start: 2019-06-17

## 2019-05-14 RX ORDER — SODIUM CHLORIDE 0.9 % (FLUSH) 0.9 %
10 SYRINGE (ML) INJECTION AS NEEDED
Status: CANCELLED
Start: 2019-06-17

## 2019-05-14 RX ORDER — SODIUM CHLORIDE 9 MG/ML
10 INJECTION INTRAMUSCULAR; INTRAVENOUS; SUBCUTANEOUS AS NEEDED
Status: CANCELLED | OUTPATIENT
Start: 2019-06-03

## 2019-05-14 RX ORDER — EPINEPHRINE 1 MG/ML
0.3 INJECTION, SOLUTION, CONCENTRATE INTRAVENOUS AS NEEDED
Status: CANCELLED | OUTPATIENT
Start: 2019-05-28

## 2019-05-14 RX ORDER — EPINEPHRINE 1 MG/ML
0.3 INJECTION, SOLUTION, CONCENTRATE INTRAVENOUS AS NEEDED
Status: CANCELLED | OUTPATIENT
Start: 2019-06-04

## 2019-05-14 RX ORDER — LANOLIN ALCOHOL/MO/W.PET/CERES
3 CREAM (GRAM) TOPICAL
Status: DISCONTINUED | OUTPATIENT
Start: 2019-05-14 | End: 2019-05-15 | Stop reason: HOSPADM

## 2019-05-14 RX ORDER — ACETAMINOPHEN 325 MG/1
650 TABLET ORAL AS NEEDED
Status: CANCELLED
Start: 2019-05-28

## 2019-05-14 RX ORDER — SODIUM CHLORIDE 9 MG/ML
10 INJECTION INTRAMUSCULAR; INTRAVENOUS; SUBCUTANEOUS AS NEEDED
Status: CANCELLED | OUTPATIENT
Start: 2019-06-05

## 2019-05-14 RX ORDER — SODIUM CHLORIDE 9 MG/ML
10 INJECTION INTRAMUSCULAR; INTRAVENOUS; SUBCUTANEOUS AS NEEDED
Status: CANCELLED | OUTPATIENT
Start: 2019-06-10

## 2019-05-14 RX ORDER — HYDROCORTISONE SODIUM SUCCINATE 100 MG/2ML
100 INJECTION, POWDER, FOR SOLUTION INTRAMUSCULAR; INTRAVENOUS AS NEEDED
Status: CANCELLED | OUTPATIENT
Start: 2019-06-03

## 2019-05-14 RX ORDER — ACETAMINOPHEN 325 MG/1
650 TABLET ORAL AS NEEDED
Status: CANCELLED
Start: 2019-06-06

## 2019-05-14 RX ORDER — DIPHENHYDRAMINE HYDROCHLORIDE 50 MG/ML
25 INJECTION, SOLUTION INTRAMUSCULAR; INTRAVENOUS ONCE
Status: CANCELLED
Start: 2019-06-10

## 2019-05-14 RX ORDER — ALBUTEROL SULFATE 0.83 MG/ML
2.5 SOLUTION RESPIRATORY (INHALATION) AS NEEDED
Status: CANCELLED
Start: 2019-06-17

## 2019-05-14 RX ORDER — SODIUM CHLORIDE 9 MG/ML
10 INJECTION INTRAMUSCULAR; INTRAVENOUS; SUBCUTANEOUS AS NEEDED
Status: CANCELLED | OUTPATIENT
Start: 2019-06-06

## 2019-05-14 RX ORDER — ONDANSETRON 2 MG/ML
8 INJECTION INTRAMUSCULAR; INTRAVENOUS AS NEEDED
Status: CANCELLED | OUTPATIENT
Start: 2019-06-10

## 2019-05-14 RX ORDER — ALBUTEROL SULFATE 0.83 MG/ML
2.5 SOLUTION RESPIRATORY (INHALATION) AS NEEDED
Status: CANCELLED
Start: 2019-06-03

## 2019-05-14 RX ORDER — SODIUM CHLORIDE 9 MG/ML
25 INJECTION, SOLUTION INTRAVENOUS CONTINUOUS
Status: CANCELLED | OUTPATIENT
Start: 2019-06-06

## 2019-05-14 RX ORDER — SODIUM CHLORIDE 9 MG/ML
25 INJECTION, SOLUTION INTRAVENOUS CONTINUOUS
Status: CANCELLED
Start: 2019-05-28

## 2019-05-14 RX ORDER — DIPHENHYDRAMINE HYDROCHLORIDE 50 MG/ML
50 INJECTION, SOLUTION INTRAMUSCULAR; INTRAVENOUS AS NEEDED
Status: CANCELLED
Start: 2019-06-05

## 2019-05-14 RX ORDER — ALBUTEROL SULFATE 0.83 MG/ML
2.5 SOLUTION RESPIRATORY (INHALATION) AS NEEDED
Status: CANCELLED
Start: 2019-06-06

## 2019-05-14 RX ORDER — ACETAMINOPHEN 325 MG/1
650 TABLET ORAL AS NEEDED
Status: CANCELLED
Start: 2019-06-05

## 2019-05-14 RX ORDER — ONDANSETRON 2 MG/ML
8 INJECTION INTRAMUSCULAR; INTRAVENOUS ONCE
Status: CANCELLED | OUTPATIENT
Start: 2019-06-07

## 2019-05-14 RX ORDER — ONDANSETRON 2 MG/ML
8 INJECTION INTRAMUSCULAR; INTRAVENOUS AS NEEDED
Status: CANCELLED | OUTPATIENT
Start: 2019-06-03

## 2019-05-14 RX ORDER — SODIUM CHLORIDE 0.9 % (FLUSH) 0.9 %
10 SYRINGE (ML) INJECTION AS NEEDED
Status: CANCELLED
Start: 2019-06-06

## 2019-05-14 RX ORDER — ACETAMINOPHEN 325 MG/1
650 TABLET ORAL AS NEEDED
Status: CANCELLED
Start: 2019-06-17

## 2019-05-14 RX ORDER — ACETAMINOPHEN 325 MG/1
650 TABLET ORAL ONCE
Status: CANCELLED
Start: 2019-06-03

## 2019-05-14 RX ORDER — HEPARIN 100 UNIT/ML
300-500 SYRINGE INTRAVENOUS AS NEEDED
Status: CANCELLED
Start: 2019-06-04

## 2019-05-14 RX ORDER — ONDANSETRON 2 MG/ML
8 INJECTION INTRAMUSCULAR; INTRAVENOUS AS NEEDED
Status: CANCELLED | OUTPATIENT
Start: 2019-05-28

## 2019-05-14 RX ORDER — ACETAMINOPHEN 325 MG/1
650 TABLET ORAL AS NEEDED
Status: CANCELLED
Start: 2019-06-03

## 2019-05-14 RX ORDER — ACETAMINOPHEN 325 MG/1
650 TABLET ORAL AS NEEDED
Status: CANCELLED
Start: 2019-06-10

## 2019-05-14 RX ORDER — SODIUM CHLORIDE 9 MG/ML
25 INJECTION, SOLUTION INTRAVENOUS CONTINUOUS
Status: CANCELLED | OUTPATIENT
Start: 2019-06-03 | End: 2019-06-03

## 2019-05-14 RX ORDER — HEPARIN 100 UNIT/ML
300-500 SYRINGE INTRAVENOUS AS NEEDED
Status: CANCELLED
Start: 2019-06-17

## 2019-05-14 RX ORDER — ALBUTEROL SULFATE 0.83 MG/ML
2.5 SOLUTION RESPIRATORY (INHALATION) AS NEEDED
Status: CANCELLED
Start: 2019-06-10

## 2019-05-14 RX ORDER — SODIUM CHLORIDE 9 MG/ML
25 INJECTION, SOLUTION INTRAVENOUS CONTINUOUS
Status: CANCELLED | OUTPATIENT
Start: 2019-06-07

## 2019-05-14 RX ORDER — SODIUM CHLORIDE 9 MG/ML
10 INJECTION INTRAMUSCULAR; INTRAVENOUS; SUBCUTANEOUS AS NEEDED
Status: CANCELLED | OUTPATIENT
Start: 2019-05-28

## 2019-05-14 RX ORDER — HYDROCORTISONE SODIUM SUCCINATE 100 MG/2ML
100 INJECTION, POWDER, FOR SOLUTION INTRAMUSCULAR; INTRAVENOUS AS NEEDED
Status: CANCELLED | OUTPATIENT
Start: 2019-06-10

## 2019-05-14 RX ORDER — ONDANSETRON 2 MG/ML
8 INJECTION INTRAMUSCULAR; INTRAVENOUS ONCE
Status: CANCELLED | OUTPATIENT
Start: 2019-06-05

## 2019-05-14 RX ORDER — EPINEPHRINE 1 MG/ML
0.3 INJECTION, SOLUTION, CONCENTRATE INTRAVENOUS AS NEEDED
Status: CANCELLED | OUTPATIENT
Start: 2019-06-17

## 2019-05-14 RX ORDER — HEPARIN 100 UNIT/ML
300-500 SYRINGE INTRAVENOUS AS NEEDED
Status: CANCELLED
Start: 2019-06-06

## 2019-05-14 RX ORDER — ACETAMINOPHEN 325 MG/1
650 TABLET ORAL ONCE
Status: CANCELLED
Start: 2019-05-28

## 2019-05-14 RX ORDER — HYDROCORTISONE SODIUM SUCCINATE 100 MG/2ML
100 INJECTION, POWDER, FOR SOLUTION INTRAMUSCULAR; INTRAVENOUS AS NEEDED
Status: CANCELLED | OUTPATIENT
Start: 2019-06-05

## 2019-05-14 RX ORDER — HEPARIN 100 UNIT/ML
300-500 SYRINGE INTRAVENOUS AS NEEDED
Status: CANCELLED
Start: 2019-06-03

## 2019-05-14 RX ORDER — HEPARIN 100 UNIT/ML
300-500 SYRINGE INTRAVENOUS AS NEEDED
Status: CANCELLED
Start: 2019-06-10

## 2019-05-14 RX ORDER — SODIUM CHLORIDE 0.9 % (FLUSH) 0.9 %
10 SYRINGE (ML) INJECTION AS NEEDED
Status: CANCELLED
Start: 2019-06-03

## 2019-05-14 RX ORDER — SODIUM CHLORIDE 9 MG/ML
10 INJECTION INTRAMUSCULAR; INTRAVENOUS; SUBCUTANEOUS AS NEEDED
Status: CANCELLED | OUTPATIENT
Start: 2019-06-17

## 2019-05-14 RX ORDER — ONDANSETRON 2 MG/ML
8 INJECTION INTRAMUSCULAR; INTRAVENOUS ONCE
Status: CANCELLED | OUTPATIENT
Start: 2019-06-03

## 2019-05-14 RX ADMIN — LEVOFLOXACIN 750 MG: 5 INJECTION, SOLUTION INTRAVENOUS at 20:00

## 2019-05-14 RX ADMIN — VANCOMYCIN HYDROCHLORIDE 1250 MG: 10 INJECTION, POWDER, LYOPHILIZED, FOR SOLUTION INTRAVENOUS at 04:14

## 2019-05-14 RX ADMIN — PIPERACILLIN SODIUM,TAZOBACTAM SODIUM 3.38 G: 3; .375 INJECTION, POWDER, FOR SOLUTION INTRAVENOUS at 17:33

## 2019-05-14 RX ADMIN — MELATONIN 3 MG: at 00:57

## 2019-05-14 RX ADMIN — CEFEPIME HYDROCHLORIDE 2 G: 2 INJECTION, POWDER, FOR SOLUTION INTRAVENOUS at 03:17

## 2019-05-14 RX ADMIN — PIPERACILLIN AND TAZOBACTAM 3.38 G: 3; .375 INJECTION, POWDER, FOR SOLUTION INTRAVENOUS at 12:58

## 2019-05-14 RX ADMIN — Medication 10 ML: at 14:00

## 2019-05-14 RX ADMIN — VANCOMYCIN HYDROCHLORIDE 1750 MG: 10 INJECTION, POWDER, LYOPHILIZED, FOR SOLUTION INTRAVENOUS at 12:00

## 2019-05-14 RX ADMIN — Medication 10 ML: at 21:42

## 2019-05-14 RX ADMIN — CEFEPIME HYDROCHLORIDE 2 G: 2 INJECTION, POWDER, FOR SOLUTION INTRAVENOUS at 09:34

## 2019-05-14 RX ADMIN — VANCOMYCIN HYDROCHLORIDE 1750 MG: 10 INJECTION, POWDER, LYOPHILIZED, FOR SOLUTION INTRAVENOUS at 21:37

## 2019-05-14 RX ADMIN — TBO-FILGRASTIM 480 MCG: 480 INJECTION, SOLUTION SUBCUTANEOUS at 11:36

## 2019-05-14 RX ADMIN — SODIUM CHLORIDE 100 ML/HR: 900 INJECTION, SOLUTION INTRAVENOUS at 06:23

## 2019-05-14 NOTE — PROGRESS NOTES
Oncology End of Shift Note Bedside shift change report given to Brenda Edward RN (incoming nurse) by Edwige Jay RN (outgoing nurse) on [facility]. Report included the following information SBAR. Shift Summary:  
Pt still getting ABX, more added. ENT rounded, Oncology rounded. Hopeful for tomorrow discharge. Issues for Physician to Address:  Pt reported nicotine replacement not sufficient Patient on Cardiac Monitoring?    
[] Yes 
[x] No 
 
Rhythm:   
 
 
 
 
 
Edwige Jay RN

## 2019-05-14 NOTE — PROGRESS NOTES
Pharmacy Automatic Renal Dosing Protocol - Antimicrobials Indication for Antimicrobials: Febrile Neutropenia in setting of metastatic testicular Ca; Chemo Rx; ?port infection Current Regimen of Each Antimicrobial: 
Vancomycin 2gm IV x1 , then 1.25g IV q8h (Start Date ; Day # 3) Cefepime 2gm IV q8h; start ; Day #3 Levofloxacin 750mg IV q24h; start ; Day #3 Previous Antimicrobial Therapy: 
 
 
Goal Level: VANCOMYCIN TROUGH GOAL RANGE Vancomycin Trough: 15 - 20 mcg/mL Date Dose & Interval Measured (mcg/mL) Extrapolated (mcg/mL) 5/15 1.25gm IV q8h 11.2 10.6 Date & time of next level: 5/15 Significant Cultures:  
: Blood = NGSF (pending) Radiology / Imaging results: (X-ray, CT scan or MRI):  
: CXR: No radiographic evidence of acute cardiopulmonary disease : CT Neck: 1. Prominent tonsils with questionable developing abscess on the right-correlate 
clinically. 2. Dental caries without apparent periapical abscess. Paralysis, amputations, malnutrition: NA 
 
Labs: 
Recent Labs 19 
0407 19 
0408 19 
1709 CREA 0.80 0.86 1.01  
BUN 7 10 11 WBC 1.5* 1.2* 0.9* Temp (24hrs), Av °F (37.2 °C), Min:98.2 °F (36.8 °C), Max:99.7 °F (37.6 °C) Creatinine Clearance (mL/min) or Dialysis: 119 ml/min Impression/Plan:  
Blood cultures pending; CXR clear; Neck CT w/ ? Tonsillar abscess; Hem-Onc consulted Vancomycin trough 10.6 is lower then desired (15-20) to treat febrile neutropenia Change Vancomycin to 1.75gm IV q8h to increase Trough Range to 15-20 Vancomycin Trough before 4am dose tomorrow Continue Cefepime 2gm IV q8h Continue Levofloxacin 750mg IV q24h Daily BMP Antimicrobial stop date to be determined. Pharmacy will follow daily and adjust medications as appropriate for renal function and/or serum levels. Thank you, 
Brian Choi, [facility] Recommended duration of therapy [email or web address]/[place]/[facility]/Pharmacy/Clinical%20Companion/Duration%20of%20ABX%20therapy. docx Renal Dosing 
[email or web address]/[place]/[facility]/Pharmacy/Clinical%20Companion/Renal%[id number]. pdf

## 2019-05-14 NOTE — PROGRESS NOTES
Hospitalist Progress Note NAME: Aylin Rubio :  1985 MRN:  [id number] Assessment / Plan: 
 
Sepsis/Neutropenic Fever POA In settings of metastatic testicular cancer on chemo therapy R/o Line infection (have port for chemo) ? R tonsillar abscess 
-cont IV Vancomycin, Cefepime and Levaquin 
-Hematology consult appreciate, need ANC >300 
-F/u blood cultures, so far negative 
-CXR clear 
-UA neg, denies any urinary complaints 
-Patient has no localizing symptoms for infection other than his mouth and submandibular/R neck discomfort.   
- CT scan of his neck-? concerning for dev abscess R tonsil 
-ENT consulted this am for further recs 
-cont on Granix until his white count has improved. [address] today Nausea/Vomiting Due to chemo/sepsis PRN IV Zofran Abdomen is benign If continued then may warrant further workup Seems resolved this am 
  
Smoker Offered nicotine patch 
  
Code Status: Full Surrogate Decision Maker: Parents 
  
DVT Prophylaxis: Lovenox 
  
Baseline: functional 
 
30.0 - 39.9 Obese / Body mass index is 36.98 kg/m². Recommended Disposition: Home w/Family Subjective: Chief Complaint / Reason for Physician Visit 
fever  Patient feels better. He says he has been having symptoms since the day after his chemo he has had mouth pain and some trouble with pain when he swallows that is been progressive. Poor p.o. intake. He denies cough or shortness of breath. No chest pain no urinary symptoms. No abdominal pain but has been having nausea. No prior diarrhea but some loose stool today. Currently his nausea is controlled on recent Compazine.  pt still with R sided neck pain and mouth pain. Appetite better. No more n. Discussed CT results and need for ENT input Patient was evaluated at 9am 
 
Discussed with RN events overnight. Review of Systems: 
Symptom Y/N Comments  Symptom Y/N Comments Fever/Chills    Chest Pain n   
 Poor Appetite y better  Edema Cough n   Abdominal Pain n   
Sputum n   Joint Pain SOB/HUSAIN n   Pruritis/Rash Nausea/vomit n   Tolerating PT/OT Diarrhea    Tolerating Diet n   
Constipation n   Other y Mouth pain/n R Could NOT obtain due to:   
 
Objective: VITALS:  
Last 24hrs VS reviewed since prior progress note. Most recent are: 
Patient Vitals for the past 24 hrs: 
 Temp Pulse Resp BP SpO2  
05/14/19 0729 99.1 °F (37.3 °C) 61 18 (!) 143/95 97 % 05/14/19 0319 98.8 °F (37.1 °C) 75 16 133/66 94 % 05/13/19 1953 99.7 °F (37.6 °C) 88 16 138/86 99 % 05/13/19 1631 98.2 °F (36.8 °C) 89 16 146/84 99 % No intake or output data in the 24 hours ending 05/14/19 8575 PHYSICAL EXAM: 
Patient is awake and alert nontoxic-appearing on room air no distress. Conjunctiva slightly pale mucous membranes are tacky I did not appreciate any lesions or erythema, but difficult to visualize tonsils. Neck is supple but he is tender on the right anterior cervical and submandibular area on exam cardiovascular regular rate no murmurs rubs or gallops lungs are clear no wheezes rhonchi or crackles. Abdomen bowel sounds present soft nontender extremities no clubbing cyanosis or edema Reviewed most current lab test results and cultures  YES Reviewed most current radiology test results   YES Review and summation of old records today    NO Reviewed patient's current orders and MAR    YES 
PMH/ reviewed - no change compared to H&P 
________________________________________________________________________ Care Plan discussed with: 
  Comments Patient y Family RN y   
Care Manager Consultant Multidiciplinary team rounds were held today with , nursing, pharmacist and clinical coordinator. Patient's plan of care was discussed; medications were reviewed and discharge planning was addressed. ________________________________________________________________________ Total NON critical care TIME:    Minutes Total CRITICAL CARE TIME Spent:   Minutes non procedure based Comments >50% of visit spent in counseling and coordination of care    
________________________________________________________________________ Ingrid Nunez MD  
 
Procedures: see electronic medical records for all procedures/Xrays and details which were not copied into this note but were reviewed prior to creation of Plan. LABS: 
I reviewed today's most current labs and imaging studies. Pertinent labs include: 
Recent Labs 05/14/19 0407 05/13/19 0408 05/12/19 1709 WBC 1.5* 1.2* 0.9* HGB 10.9* 11.6* 12.3 HCT 30.6* 32.3* 34.1*  
* 105* 108* Recent Labs 05/14/19 0407 05/13/19 0408 05/12/19 1709  137 135* K 3.8 4.1 3.8  107 103 CO2 25 24 25 GLU 94 90 96 BUN 7 10 11 CREA 0.80 0.86 1.01  
CA 8.3* 8.3* 8.6 MG 1.9  --   --   
PHOS 2.7  --   --   
ALB  --  3.0* 3.5 TBILI  --  1.0 1.0 SGOT  --  6* 9* ALT  --  22 26 Signed: Ingrid Nunez MD

## 2019-05-14 NOTE — PROGRESS NOTES
Telehospitalist: patient feels like nicotine patch has worn off, have asked RN to notify patient that he is on th maximum dose of nicoderm and is a daily dipsesed drug

## 2019-05-14 NOTE — CONSULTS
[address], [address] [place] [address] Juan M Patriciou, [address] [facility]  [phone]       Oncology Inpatient Progress note    Patient: Jaja Sawyer MRN: [id number]  SSN: [id number]    YOB: 1985  Age: 29 y.o. Sex: male        Diagnosis:      1. 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 Right sided radical orchiectomy  2. Systemic chemotherapy,   BEP - Cycle 1 Day 16    Subjective:      Jaja Sawyer is a 29 y.o. male who we were asked to see by Dr. Becky Garvye for neutropenic fever. He was diagnosed with testicular carcinoma earlier this year. He underwent a right radical orchiectomy on 03/27/2019. The pathology showed T1c disease. A CT scan was done on 04/03/2019 which revealed metastatic disease. He is currently undergoing systemic chemotherapy with BEP. He presented to the ED Sunday with complaints of vomiting and fever. He was admitted for further evaluation and management. He has been afebrile today and feels better. This morning he said he was able to eat without any problems. However, there was concern whether he had a tonsillar abscess based off of the head and neck CT yesterday. A ENT consult was completed today by Dr. Rocky Seaman and he verified no abscess or source of infection.        Review of Systems:    Constitutional: negative  Eyes: negative  Ears, Nose, Mouth, Throat, and Face: negative  Respiratory: negative  Cardiovascular: negative  Gastrointestinal: negative  Genitourinary:negative  Integument/Breast: negative  Hematologic/Lymphatic: negative  Musculoskeletal:negative  Neurological: negative      Past Medical History:   Diagnosis Date    Anxiety disorder     Depression     Suicidal thoughts     Testicle cancer [facility])      Past Surgical History:   Procedure Laterality Date    HX ORTHOPAEDIC      lft knee surgery    HX ORTHOPAEDIC      right shoulder/right knee    IR INSERT TUNL CVC W PORT OVER 5 YEARS  4/18/2019      No family history on file. Social History     Tobacco Use    Smoking status: Current Every Day Smoker     Packs/day: 0.50    Smokeless tobacco: Former User   Substance Use Topics    Alcohol use: Yes     Comment: per pt \"once a weekend 6 or 7 beers\"      Prior to Admission medications    Medication Sig Start Date End Date Taking? Authorizing Provider   prochlorperazine (COMPAZINE) 10 mg tablet Take 5 mg by mouth every six (6) hours as needed for Nausea. Yes Provider, Historical   ondansetron (ZOFRAN ODT) 4 mg disintegrating tablet Take 1 Tab by mouth every eight (8) hours as needed for Nausea. 4/8/19  Yes Janae Sanders, NP   lidocaine-prilocaine (EMLA) topical cream Apply  to affected area as needed for Pain. 4/8/19  Yes Lorin Garciaer H, NP        No Known Allergies      Objective:     Vitals:    05/14/19 0319 05/14/19 0729 05/14/19 0733 05/14/19 1523   BP: 133/66 (!) 143/95  122/70   Pulse: 75 61  63   Resp: 16 18  18   Temp: 98.8 °F (37.1 °C) 99.1 °F (37.3 °C)  98.8 °F (37.1 °C)   SpO2: 94% 97%  98%   Weight:   243 lb 3.2 oz (110.3 kg)    Height:           Physical Exam:    GENERAL: alert, cooperative, no distress, appears stated age  EYE: conjunctivae/corneas clear. PERRL, EOM's intact. Fundi benign  LYMPHATIC: Cervical, supraclavicular, and axillary nodes normal.   THROAT & NECK: normal and no erythema or exudates noted. LUNG: clear to auscultation bilaterally  HEART: regular rate and rhythm, S1, S2 normal, no murmur, click, rub or gallop  ABDOMEN: soft, non-tender. Bowel sounds normal. No masses,  no organomegaly  EXTREMITIES:  extremities normal, atraumatic, no cyanosis or edema  SKIN: extensive tattoo on the torso and arms  NEUROLOGIC: AOx3. Gait normal. Reflexes and motor strength normal and symmetric. Cranial nerves 2-12 and sensation grossly intact.         CT Results (most recent):  Results from Hospital Encounter encounter on 05/12/19   CT NECK SOFT TISSUE W CONT    Narrative EXAM: CT SOFT TISSUE NECK with Contrast    INDICATION:  Neutropenic fever. mouth and neck pain r/o abscess      TECHNIQUE: Multislice helical CT was performed from the skull base to the  thoracic inlet with 100 mL Isovue 300 IV. Contiguous 2.5 mm axial images were  reconstructed and coronal and sagittal 2D reformations were generated. CT dose  reduction was achieved through use of a standardized protocol tailored for this  examination and automatic exposure control for dose modulation. FINDINGS:  [place] tonsils are symmetrically slightly large, with central subcentimeter  mild hypodensity, not conclusive but difficult to exclude developing tonsillar  abscess particularly on the right. There are dental caries without apparent periapical or floor of mouth abscess. No mass or significant adenopathy is identified within the neck. The carotid and jugular vessels are patent. The submandibular and parotid glands are normal. Thyroid is unremarkable. No laryngeal edema or mass is identified. Deep parapharyngeal and  retropharyngeal regions are normal. Subglottic airway is clear. No abnormalities are identified in the skull base. The visualized mastoid air cells and paranasal sinuses are clear. The lung apices are clear. Impression IMPRESSION:   1. Prominent tonsils with questionable developing abscess on the right-correlate  clinically. 2. Dental caries without apparent periapical abscess.          Lab Results   Component Value Date/Time    WBC 1.5 (L) 05/14/2019 04:07 AM    HGB 10.9 (L) 05/14/2019 04:07 AM    HCT 30.6 (L) 05/14/2019 04:07 AM    PLATELET 326 (L) 61/34/4732 04:07 AM    MCV 85.2 05/14/2019 04:07 AM       Lab Results   Component Value Date/Time    Sodium 136 05/14/2019 04:07 AM    Potassium 3.8 05/14/2019 04:07 AM    Chloride 105 05/14/2019 04:07 AM    CO2 25 05/14/2019 04:07 AM    Anion gap 6 05/14/2019 04:07 AM    Glucose 94 05/14/2019 04:07 AM    BUN 7 05/14/2019 04:07 AM    Creatinine 0.80 05/14/2019 04:07 AM    BUN/Creatinine ratio 9 (L) 05/14/2019 04:07 AM    GFR est AA >60 05/14/2019 04:07 AM    GFR est non-AA >60 05/14/2019 04:07 AM    Calcium 8.3 (L) 05/14/2019 04:07 AM    Bilirubin, total 1.0 05/13/2019 04:08 AM    AST (SGOT) 6 (L) 05/13/2019 04:08 AM    Alk. phosphatase 57 05/13/2019 04:08 AM    Protein, total 6.9 05/13/2019 04:08 AM    Albumin 3.0 (L) 05/13/2019 04:08 AM    Globulin 3.9 05/13/2019 04:08 AM    A-G Ratio 0.8 (L) 05/13/2019 04:08 AM    ALT (SGPT) 22 05/13/2019 04:08 AM           Assessment:     1. 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  Neutropenic fever    > Continue antibiotics  > Granix 480 mcg SQ x 2 days  > Discharge home on po levaquin  > Okay to discharge when [address] > 0.3      Plan:     > Continue antibiotics  > Granix today  > Delay chemotherapy 1 week  > Discharge home on oral antibiotics  > Okay for discharge when ANC > 0.3      Signed By: Dorcas Magallon NP     May 14, 2019

## 2019-05-14 NOTE — PROGRESS NOTES
Problem: Risk for Spread of Infection Goal: Prevent transmission of infectious organism to others Description Prevent the transmission of infectious organisms to other patients, staff members, and visitors. Outcome: Progressing Towards Goal 
  
Problem: Falls - Risk of 
Goal: *Absence of Falls Description Document Irving Carvalho Fall Risk and appropriate interventions in the flowsheet. Outcome: Progressing Towards Goal 
  
Problem: Neutropenic Fever: Day 2 Goal: Off Pathway (Use only if patient is Off Pathway) Outcome: Progressing Towards Goal 
Goal: Activity/Safety Outcome: Progressing Towards Goal 
Goal: Diagnostic Test/Procedures Outcome: Progressing Towards Goal 
Goal: Nutrition/Diet Outcome: Progressing Towards Goal 
Goal: Discharge Planning Outcome: Progressing Towards Goal 
Goal: Medications Outcome: Progressing Towards Goal

## 2019-05-14 NOTE — CONSULTS
Ears/Nose/Throat Consult    Subjective:     Date of Consultation:  May 14, 2019    Referring Physician: Dr. Sumit Cabrera    History of Present Illness:   Patient is a 29 y.o.  male who is being seen for tonsillitis. he  was admitted to the hospital for Neutropenic fever ([facility] [address] [D70.9, R50.81]. Pt had one week h/o sore throat worse on right side. Admitted, CT obtained showing possible developing right sided abscess. Put on broad spectrum IV abx. Neutropenic precautions. ON chemo for metastatic testicular ca. Swallowing better, no fever, voice fine. Some slight neck tenderness     Past Medical History:   Diagnosis Date    Anxiety disorder     Depression     Suicidal thoughts     Testicle cancer ([facility] [address]       No family history on file.    Social History     Tobacco Use    Smoking status: Current Every Day Smoker     Packs/day: 0.50    Smokeless tobacco: Former User   Substance Use Topics    Alcohol use: Yes     Comment: per pt \"once a weekend 6 or 7 beers\"     Past Surgical History:   Procedure Laterality Date    HX ORTHOPAEDIC      lft knee surgery    HX ORTHOPAEDIC      right shoulder/right knee    IR INSERT TUNL CVC W PORT OVER 5 YEARS  4/18/2019      Current Facility-Administered Medications   Medication Dose Route Frequency    melatonin tablet 3 mg  3 mg Oral QHS PRN    vancomycin (VANCOCIN) 1750 mg in  ml infusion  1,750 mg IntraVENous Q8H    [START ON 5/15/2019] Please draw Vancomycin Trough before  4am dose  1 Each Other ONCE    piperacillin-tazobactam (ZOSYN) 3.375 g in 0.9% sodium chloride (MBP/ADV) 100 mL  3.375 g IntraVENous ONCE    Followed by    piperacillin-tazobactam (ZOSYN) 3.375 g in 0.9% sodium chloride (MBP/ADV) 100 mL  3.375 g IntraVENous Q8H    prochlorperazine (COMPAZINE) with saline injection 10 mg  10 mg IntraVENous Q6H PRN    tbo-filgrastim (GRANIX) injection 480 mcg  480 mcg SubCUTAneous DAILY    levoFLOXacin (LEVAQUIN) 750 mg in D5W IVPB  750 mg IntraVENous Q24H    sodium chloride (NS) flush 5-40 mL  5-40 mL IntraVENous Q8H    sodium chloride (NS) flush 5-40 mL  5-40 mL IntraVENous PRN    acetaminophen (TYLENOL) tablet 650 mg  650 mg Oral Q6H PRN    ondansetron (ZOFRAN) injection 4 mg  4 mg IntraVENous Q4H PRN    enoxaparin (LOVENOX) injection 40 mg  40 mg SubCUTAneous Q24H    nicotine (NICODERM CQ) 21 mg/24 hr patch 1 Patch  1 Patch TransDERmal DAILY      No Known Allergies     Review of Systems:  A comprehensive review of systems was negative except for that written in the History of Present Illness. Objective:     Patient Vitals for the past 8 hrs:   BP Temp Pulse Resp SpO2 Weight   19 0733      110.3 kg (243 lb 3.2 oz)   19 0729 (!) 143/95 99.1 °F (37.3 °C) 61 18 97 %      Temp (24hrs), Av °F (37.2 °C), Min:98.2 °F (36.8 °C), Max:99.7 °F (37.6 °C)    No intake/output data recorded. Physical Exam:   General  General Appearance- Well nourished adult in no apparent distress who is alert and cooperative. Gait- Not asxsessed Voice- Normal voice and communication. HEENT  Head: Normally developed without evidence of trauma or lesions. Face:  Skin:  Normal color, texture, and turgor. There are no lesions of concern nor swelling or induration. Lips- normal.  Facial Nerve- Bilateral- function is equal and symmetrical with no deficit. Ear: Auricle- Left- Normal development without sinus pit, cyst or any other lesion. Right- Normal development without sinus pit, cyst or any other lesion  Auditory Canal (otoscopic examination)  Left- clean, without edema, discharge, or lesions. Right  clean, without edema, discharge, or lesions. Tympanic membrane:  Left- intact and mobile, without middle ear effusion, retraction, or sclerosis. Right- intact and mobile, without middle ear effusion, retraction or sclerosis. Mastoid- Left- No erythema, edema, tenderness, or protrusion of the auricle.   Right- No erythema, edema, tenderness, or protrusion of the auricle. Nose: External Nose- Normally developed without lesion. Nasal Mucosa- moist and pink without erythema, edema, or lesions. Nasal septum- Caudally the septum is relatively straight without lesion. Turbinates- Bilateral- The turbinates are without hypertrophy, edema, or lesion. Sinuses-  All sinuses are nontender to percussion. Oral Cavity/OropharynxDentition- Normal dentition for age witho no evidence of discoloration, inflammation, or infection. Oral Mucosa: moist without erythema or lesions. Tongue- no lesions or palpable masses. Floor of mouth- soft without palpable masses or mucosal lesion. Palate and uvula- Palate is without cleft and the uvula is not bifid. Soft palate elevates symmetrically. Tonsils- Bilateral -Normal in size without exudates or erythema. Salivary Ducts-  Ducts appear to be normal.  Throat: Pharynx- Normal mucosal lining without erythema or mass. Larynx: difficult to examine directly. Neck:-- Trachea- midline with normal laryngeal framework with no crepitus. Salivary Glands- normal to palpation without nodules or tenderness. Thyroid- normal to palpation without mass or irregularity. Lymph Nodes- No palpable adenopathy or masses, mildly tender. Range of Motion- good in all directions, with good anterior-posterior and lateral flexion and rotation. Chest and Lung Exam: Normal respiratory effort with no wheezing, retractions, or rales. Cardiovascular: Regular rate and rhythm. Normal peripheral pulses without bruits. Neurologic exam: Alert and oriented to person, place, and time.  .       CT showed phlegmon in right tonsil, no abscess  Assessment:     Tonsillitis, responding to IV treatment, no abscess    Hospital Problems  Date Reviewed: 5/12/2019          Codes Class Noted POA    Neutropenic fever ([facility] ICD-10-CM: D70.9, R50.81  ICD-9-CM: 288.00, 780.61  5/12/2019 Unknown        Smoker ICD-10-CM: F04.558  ICD-9-CM: 305.1  5/12/2019 Unknown Plan:     Responding to treatment. Typically, will treat with clindamycin as outpt for this type of infection. OK from discharge from ENT [facility]anpoint, no need for procedure. May f/u as needed as outpatient.       Signed By: Madeline West MD     May 14, 2019

## 2019-05-14 NOTE — INTERDISCIPLINARY ROUNDS
Oncology Interdisciplinary rounds were held today to discuss patient plan of care and outcomes. The following members were present: Nursing, Physician, Case Management, Pharmacy, and PT/OT Actual Length of Stay: 2 DRG GLOS: 3.7 Expected Length of Stay: 3d 16h Plan            Discharge EMT to see the patient Home with family when D/C

## 2019-05-14 NOTE — PROGRESS NOTES
Problem: Neutropenic Fever: Day 2 Goal: Activity/Safety Outcome: Progressing Towards Goal 
Goal: Diagnostic Test/Procedures Outcome: Progressing Towards Goal 
Goal: Nutrition/Diet Outcome: Progressing Towards Goal

## 2019-05-15 VITALS
OXYGEN SATURATION: 95 % | SYSTOLIC BLOOD PRESSURE: 129 MMHG | TEMPERATURE: 98.3 F | RESPIRATION RATE: 18 BRPM | HEIGHT: 68 IN | DIASTOLIC BLOOD PRESSURE: 88 MMHG | BODY MASS INDEX: 36.86 KG/M2 | HEART RATE: 76 BPM | WEIGHT: 243.2 LBS

## 2019-05-15 LAB
ANION GAP SERPL CALC-SCNC: 5 MMOL/L (ref 5–15)
BASOPHILS # BLD: 0 K/UL (ref 0–0.1)
BASOPHILS NFR BLD: 0 % (ref 0–1)
BLASTS NFR BLD MANUAL: 0 %
BUN SERPL-MCNC: 8 MG/DL (ref 6–20)
BUN/CREAT SERPL: 9 (ref 12–20)
CALCIUM SERPL-MCNC: 8.7 MG/DL (ref 8.5–10.1)
CHLORIDE SERPL-SCNC: 106 MMOL/L (ref 97–108)
CO2 SERPL-SCNC: 28 MMOL/L (ref 21–32)
CREAT SERPL-MCNC: 0.86 MG/DL (ref 0.7–1.3)
DATE LAST DOSE: ABNORMAL
DIFFERENTIAL METHOD BLD: ABNORMAL
EOSINOPHIL # BLD: 0 K/UL (ref 0–0.4)
EOSINOPHIL NFR BLD: 0 % (ref 0–7)
ERYTHROCYTE [DISTWIDTH] IN BLOOD BY AUTOMATED COUNT: 11.9 % (ref 11.5–14.5)
GLUCOSE SERPL-MCNC: 83 MG/DL (ref 65–100)
HCT VFR BLD AUTO: 29.9 % (ref 36.6–50.3)
HGB BLD-MCNC: 10.7 G/DL (ref 12.1–17)
IMM GRANULOCYTES # BLD AUTO: 0 K/UL
IMM GRANULOCYTES NFR BLD AUTO: 0 %
LYMPHOCYTES # BLD: 1.5 K/UL (ref 0.8–3.5)
LYMPHOCYTES NFR BLD: 29 % (ref 12–49)
MAGNESIUM SERPL-MCNC: 1.9 MG/DL (ref 1.6–2.4)
MCH RBC QN AUTO: 30.7 PG (ref 26–34)
MCHC RBC AUTO-ENTMCNC: 35.8 G/DL (ref 30–36.5)
MCV RBC AUTO: 85.7 FL (ref 80–99)
METAMYELOCYTES NFR BLD MANUAL: 0 %
MONOCYTES # BLD: 0.6 K/UL (ref 0–1)
MONOCYTES NFR BLD: 11 % (ref 5–13)
MYELOCYTES NFR BLD MANUAL: 1 %
NEUTS BAND NFR BLD MANUAL: 2 % (ref 0–6)
NEUTS SEG # BLD: 3.1 K/UL (ref 1.8–8)
NEUTS SEG NFR BLD: 57 % (ref 32–75)
NRBC # BLD: 0 K/UL (ref 0–0.01)
NRBC BLD-RTO: 0 PER 100 WBC
OTHER CELLS NFR BLD MANUAL: 0 %
PHOSPHATE SERPL-MCNC: 3.2 MG/DL (ref 2.6–4.7)
PLATELET # BLD AUTO: 166 K/UL (ref 150–400)
PMV BLD AUTO: 9.1 FL (ref 8.9–12.9)
POTASSIUM SERPL-SCNC: 4 MMOL/L (ref 3.5–5.1)
PROMYELOCYTES NFR BLD MANUAL: 0 %
RBC # BLD AUTO: 3.49 M/UL (ref 4.1–5.7)
RBC MORPH BLD: ABNORMAL
REPORTED DOSE,DOSE: ABNORMAL UNITS
REPORTED DOSE/TIME,TMG: 2000
SODIUM SERPL-SCNC: 139 MMOL/L (ref 136–145)
VANCOMYCIN TROUGH SERPL-MCNC: 20.9 UG/ML (ref 5–10)
WBC # BLD AUTO: 5.3 K/UL (ref 4.1–11.1)

## 2019-05-15 PROCEDURE — 74011000258 HC RX REV CODE- 258: Performed by: EMERGENCY MEDICINE

## 2019-05-15 PROCEDURE — 80048 BASIC METABOLIC PNL TOTAL CA: CPT

## 2019-05-15 PROCEDURE — 85027 COMPLETE CBC AUTOMATED: CPT

## 2019-05-15 PROCEDURE — 84100 ASSAY OF PHOSPHORUS: CPT

## 2019-05-15 PROCEDURE — 74011250636 HC RX REV CODE- 250/636: Performed by: EMERGENCY MEDICINE

## 2019-05-15 PROCEDURE — 83735 ASSAY OF MAGNESIUM: CPT

## 2019-05-15 PROCEDURE — 74011250637 HC RX REV CODE- 250/637: Performed by: EMERGENCY MEDICINE

## 2019-05-15 PROCEDURE — 80202 ASSAY OF VANCOMYCIN: CPT

## 2019-05-15 PROCEDURE — 36415 COLL VENOUS BLD VENIPUNCTURE: CPT

## 2019-05-15 RX ORDER — AMOXICILLIN AND CLAVULANATE POTASSIUM 875; 125 MG/1; MG/1
1 TABLET, FILM COATED ORAL EVERY 12 HOURS
Qty: 20 TAB | Refills: 0 | Status: SHIPPED | OUTPATIENT
Start: 2019-05-15 | End: 2019-05-25

## 2019-05-15 RX ORDER — AMOXICILLIN AND CLAVULANATE POTASSIUM 875; 125 MG/1; MG/1
1 TABLET, FILM COATED ORAL EVERY 12 HOURS
Status: DISCONTINUED | OUTPATIENT
Start: 2019-05-15 | End: 2019-05-15 | Stop reason: HOSPADM

## 2019-05-15 RX ADMIN — PIPERACILLIN SODIUM,TAZOBACTAM SODIUM 3.38 G: 3; .375 INJECTION, POWDER, FOR SOLUTION INTRAVENOUS at 03:22

## 2019-05-15 RX ADMIN — VANCOMYCIN HYDROCHLORIDE 1750 MG: 10 INJECTION, POWDER, LYOPHILIZED, FOR SOLUTION INTRAVENOUS at 03:22

## 2019-05-15 RX ADMIN — AMOXICILLIN AND CLAVULANATE POTASSIUM 1 TABLET: 875; 125 TABLET, FILM COATED ORAL at 09:40

## 2019-05-15 RX ADMIN — Medication 10 ML: at 05:10

## 2019-05-15 NOTE — DISCHARGE INSTRUCTIONS
HOSPITALIST DISCHARGE INSTRUCTIONS    NAME: Padma Martinez   :  1985   MRN:  [id number]     Date/Time:  5/15/2019 8:08 AM    ADMIT DATE: 2019   DISCHARGE DATE: 5/15/2019         · It is important that you take the medication exactly as they are prescribed. · Keep your medication in the bottles provided by the pharmacist and keep a list of the medication names, dosages, and times to be taken in your wallet. · Do not take other medications without consulting your doctor. What to do at Home    Recommended diet:  Regular Diet    Recommended activity: Activity as tolerated      If you have questions regarding the hospital related prescriptions or hospital related issues please call [facility] at [phone]. You can always direct your questions to your primary care doctor if you are unable to reach your hospital physician; your PCP works as an extension of your hospital doctor just like your hospital doctor is an extension of your PCP for your time at the hospital [facility])    If you experience any of the following symptoms then please call your primary care physician or return to the emergency room if you cannot get hold of your doctor:    Fever, chills, nausea, vomiting, or persistent diarrhea  Worsening weakness or new problems with your speech or balance  Dark stools or visible blood in your stools  New Leg swelling or shortness of breath as these could be signs of a clot    Additional Instructions:      Bring these papers with you to your follow up appointments. The papers will help your doctors be sure to continue the care plan from the hospital.    F/u with oncology in 3-5day  F/u with ENT in 1-2 weeks  Call MD if fever, chils, increased neck or mouth pain          Information obtained by :  I understand that if any problems occur once I am at home I am to contact my physician. I understand and acknowledge receipt of the instructions indicated above. Physician's or R.N.'s Signature                                                                  Date/Time                                                                                                                                              Patient or Representative Signature

## 2019-05-15 NOTE — PROGRESS NOTES
Oncology End of Shift Note Bedside shift change report given to JENNIFER Aden (incoming nurse) by Rene Leon (outgoing nurse) on Crystal Ava. Report included the following information SBAR, Kardex, MAR and Recent Results. Shift Summary: No acute acute changes; no c/o of pain; labs drwan Issues for Physician to Address:   
 
Patient on Cardiac Monitoring? [] Yes 
[x] No 
 
Rhythm:   
 
 
 
Shift Events Rene Leon

## 2019-05-15 NOTE — DISCHARGE SUMMARY
Hospitalist Discharge Summary     Patient ID:  Manish Castro  [id number]  40 y.o.  1985 5/12/2019    PCP on record: Fide Mcfarland MD    Admit date: 5/12/2019  Discharge date and time: 5/15/2019    DISCHARGE DIAGNOSIS:  Developing sepsis and neutropenic fever, present on admission  Right early tonsillar abscess versus tonsillitis  Metastatic testicular cancer on chemo  Nausea and vomiting, resolved  Tobacco use  Obesity with a BMI of 36      CONSULTATIONS:  IP CONSULT TO HOSPITALIST  IP CONSULT TO HEMATOLOGY  IP CONSULT TO OTOLARYNGOLOGY    Excerpted HPI from H&P of Delta Cruz MD:  CHIEF COMPLAINT: fever     HISTORY OF PRESENT ILLNESS:     Shikha Hou is a 29 y.o.  male who presents with fever at home. As per patient, since chemo 1 week ago he is been having nausea and vomiting. Today, his mother saw him sweating so checked the temperature so noted to be 101 at home. Pt reported history of testicular cancer and undergoing chemotherapy. Pt denies any abdominal pain, chest pain, problems urination, cough, shortness of breath. He reported mild throat pain due to vomiting. In ED pt noted to have neutropenic fever.      We were asked to admit for work up and evaluation of the above problems.            ______________________________________________________________________  DISCHARGE SUMMARY/HOSPITAL COURSE:  for full details see H&P, daily progress notes, labs, consult notes. Sepsis/Neutropenic Fever POA  In settings of metastatic testicular cancer on chemo therapy  R/o Line infection (have port for chemo)  ? R tonsillar abscess  -cont IV Vancomycin, Cefepime and Levaquin, changed to zosyn with tonsillar abscess  -Hematology consult appreciate, need ANC >300  -F/u blood cultures, so far negative  -CXR clear  -UA neg, denies any urinary complaints  blood cx remain neg  -Patient has no localizing symptoms for infection other than his mouth and submandibular/R neck discomfort.     - CT scan of his neck-? concerning for dev abscess R tonsil  -ENT eval appreciated   -cont on Granix until his white count has improved.  today  -change abx to Augmentin 10 day course     Nausea/Vomiting  Due to chemo/sepsis  PRN IV Zofran  Abdomen is benign  If continued then may warrant further workup  Seems resolved      Smoker  Offered nicotine patch     Recommended Disposition: Home w/Family    Patient will be discharged home today. Will transition from Zosyn to Augmentin to complete a 10-day course for his tonsillitis and early developing abscess. He will need to follow-up with oncology in the next 3 to 5 days. He will need to follow-up with ENT in 1 to 2 weeks. Patient was treated for neutropenic fever and initially no definitive source of infection was found. CT scan revealed possible early right tonsillar abscess. He was seen by ENT who did not visualize an abscess but recommended treatment for tonsillitis. Patient was followed by oncology started on Granix and his white count has improved significantly his ANC today is over 2000. Blood cultures so far remain negative.      _______________________________________________________________________  Patient seen and examined by me on discharge day. Pertinent Findings:    Patient feels \"great\". Patient says his neck pain and odynophagia is much improved. No further nausea and is eager to go home this morning. He is awake and alert well-appearing no distress his submandibular and right-sided neck pain has improved on exam cardiovascular regular rate lungs are clear abdomen benign  _______________________________________________________________________  DISCHARGE MEDICATIONS:   Current Discharge Medication List      START taking these medications    Details   amoxicillin-clavulanate (AUGMENTIN) 875-125 mg per tablet Take 1 Tab by mouth every twelve (12) hours for 10 days.   Qty: 20 Tab, Refills: 0         CONTINUE these medications which have NOT CHANGED    Details   prochlorperazine (COMPAZINE) 10 mg tablet Take 5 mg by mouth every six (6) hours as needed for Nausea. ondansetron (ZOFRAN ODT) 4 mg disintegrating tablet Take 1 Tab by mouth every eight (8) hours as needed for Nausea. Qty: 30 Tab, Refills: 2    Associated Diagnoses: Seminoma of right testis, stage 2 (HCC); CINV (chemotherapy-induced nausea and vomiting)      lidocaine-prilocaine (EMLA) topical cream Apply  to affected area as needed for Pain.   Qty: 30 g, Refills: 0               Follow-up Information     Follow up With Specialties Details Why Contact Info    Trey Murillo MD Hematology and Oncology, Internal Medicine, Hematology, Oncology In 3 days  [facility]  [facility] [address] [facility]      Xochilt Tyson MD Otolaryngology In 1 week  [address] [phone]      Joe Keys MD Family Practice Go on 5/22/2019 Hospital follow-up scheduled at 1:00pm ( If you have questions or need to reschedule please call  [phone] [address] [place] [address] [phone]          ________________________________________________________________    Risk of deterioration: Moderate    Condition at Discharge:  Stable  __________________________________________________________________    Disposition  Home with family, no needs    ____________________________________________________________________    Code Status: Full Code  ___________________________________________________________________      Total time in minutes spent coordinating this discharge (includes going over instructions, follow-up, prescriptions, and preparing report for sign off to her PCP) :  35 minutes    Signed:  Дмитрий Lambert MD

## 2019-05-15 NOTE — PROGRESS NOTES
Problem: Risk for Spread of Infection Goal: Prevent transmission of infectious organism to others Description Prevent the transmission of infectious organisms to other patients, staff members, and visitors. Outcome: Resolved/Met Problem: Patient Education:  Go to Education Activity Goal: Patient/Family Education Outcome: Resolved/Met Problem: Falls - Risk of 
Goal: *Absence of Falls Description Document Irving Carvalho Fall Risk and appropriate interventions in the flowsheet. Outcome: Resolved/Met Problem: Patient Education: Go to Patient Education Activity Goal: Patient/Family Education Outcome: Resolved/Met Problem: Patient Education: Go to Patient Education Activity Goal: Patient/Family Education Outcome: Resolved/Met Problem: Neutropenic Fever: 0-2 hours Goal: Off Pathway (Use only if patient is Off Pathway) Outcome: Resolved/Met Goal: Activity/Safety Outcome: Resolved/Met Goal: Consults, if ordered Outcome: Resolved/Met Goal: Diagnostic Test/Procedures Outcome: Resolved/Met Goal: Nutrition/Diet Outcome: Resolved/Met Goal: Medications Outcome: Resolved/Met Goal: Respiratory Outcome: Resolved/Met Goal: Treatments/Interventions/Procedures Outcome: Resolved/Met Goal: Psychosocial 
Outcome: Resolved/Met Goal: *Optimal pain control at patient's stated goal 
Outcome: Resolved/Met Goal: *Hemodynamically stable Outcome: Resolved/Met Goal: *Adequate oxygenation Outcome: Resolved/Met Goal: *Receives antibiotics within one hour of admission or first febrile episode Outcome: Resolved/Met Problem: Neutropenic Fever: Day 1 Goal: Off Pathway (Use only if patient is Off Pathway) Outcome: Resolved/Met Goal: Activity/Safety Outcome: Resolved/Met Goal: Consults, if ordered Outcome: Resolved/Met Goal: Diagnostic Test/Procedures Outcome: Resolved/Met Goal: Nutrition/Diet Outcome: Resolved/Met Goal: Discharge Planning Outcome: Resolved/Met Goal: Medications Outcome: Resolved/Met Goal: Respiratory Outcome: Resolved/Met Goal: Treatments/Interventions/Procedures Outcome: Resolved/Met Goal: Psychosocial 
Outcome: Resolved/Met Goal: *Optimal pain control at patient's stated goal 
Outcome: Resolved/Met Goal: *Hemodynamically stable Outcome: Resolved/Met Goal: *Adequate oxygenation Outcome: Resolved/Met Problem: Neutropenic Fever: Day 2 Goal: Off Pathway (Use only if patient is Off Pathway) Outcome: Resolved/Met Goal: Activity/Safety Outcome: Resolved/Met Goal: Consults, if ordered Outcome: Resolved/Met Goal: Diagnostic Test/Procedures Outcome: Resolved/Met Goal: Nutrition/Diet Outcome: Resolved/Met Goal: Discharge Planning Outcome: Resolved/Met Goal: Medications Outcome: Resolved/Met Goal: Respiratory Outcome: Resolved/Met Goal: Treatments/Interventions/Procedures Outcome: Resolved/Met Goal: Psychosocial 
Outcome: Resolved/Met Goal: *Optimal pain control at patient's stated goal 
Outcome: Resolved/Met Goal: *Hemodynamically stable Outcome: Resolved/Met Goal: *Adequate oxygenation Outcome: Resolved/Met Goal: *Appropriate antimicrobial therapy per culture sensitivity results Outcome: Resolved/Met Problem: Neutropenic Fever: Day 3 Goal: Off Pathway (Use only if patient is Off Pathway) Outcome: Resolved/Met Goal: Activity/Safety Outcome: Resolved/Met Goal: Consults, if ordered Outcome: Resolved/Met Goal: Diagnostic Test/Procedures Outcome: Resolved/Met Goal: Nutrition/Diet Outcome: Resolved/Met Goal: Discharge Planning Outcome: Resolved/Met Goal: Medications Outcome: Resolved/Met Goal: Respiratory Outcome: Resolved/Met Goal: Treatments/Interventions/Procedures Outcome: Resolved/Met Goal: Psychosocial 
Outcome: Resolved/Met Goal: *Optimal pain control at patient's stated goal 
Outcome: Resolved/Met Goal: *Hemodynamically stable Outcome: Resolved/Met Goal: *Adequate oxygenation Outcome: Resolved/Met Goal: *Afebrile Outcome: Resolved/Met Goal: *Demonstrates progressive activity Outcome: Resolved/Met Goal: *Tolerating diet Outcome: Resolved/Met Goal: *Appropriate antimicrobial therapy per culture sensitivity results Outcome: Resolved/Met Problem: Neutropenic Fever: Day 4 Goal: Off Pathway (Use only if patient is Off Pathway) Outcome: Resolved/Met Goal: Activity/Safety Outcome: Resolved/Met Goal: Consults, if ordered Outcome: Resolved/Met Goal: Diagnostic Test/Procedures Outcome: Resolved/Met Goal: Nutrition/Diet Outcome: Resolved/Met Goal: Discharge Planning Outcome: Resolved/Met Goal: Medications Outcome: Resolved/Met Goal: Respiratory Outcome: Resolved/Met Goal: Treatments/Interventions/Procedures Outcome: Resolved/Met Goal: Psychosocial 
Outcome: Resolved/Met Goal: *Optimal pain control at patient's stated goal 
Outcome: Resolved/Met Goal: *Hemodynamically stable Outcome: Resolved/Met Goal: *Adequate oxygenation Outcome: Resolved/Met Goal: *Afebrile Outcome: Resolved/Met Goal: *Demonstrates progressive activity Outcome: Resolved/Met Goal: *Tolerating diet Outcome: Resolved/Met Problem: Neutropenic Fever: Day 5 Goal: Off Pathway (Use only if patient is Off Pathway) Outcome: Resolved/Met Goal: Activity/Safety Outcome: Resolved/Met Goal: Consults, if ordered Outcome: Resolved/Met Goal: Diagnostic Test/Procedures Outcome: Resolved/Met Goal: Nutrition/Diet Outcome: Resolved/Met Goal: Discharge Planning Outcome: Resolved/Met Goal: Medications Outcome: Resolved/Met Goal: Respiratory Outcome: Resolved/Met Goal: Treatments/Interventions/Procedures Outcome: Resolved/Met Goal: Psychosocial 
Outcome: Resolved/Met Problem: Neutropenic Fever: Discharge Outcomes Goal: *Demonstrates ability to perform prescribed activity without shortness of breath or discomfort Outcome: Resolved/Met Goal: *Verbalizes understanding and describes prescribed diet Outcome: Resolved/Met Goal: *Describes follow-up/return visits to physicians Outcome: Resolved/Met Goal: *Describes home care/support arrangements established based on need Outcome: Resolved/Met Goal: *Describes available resources and support systems Outcome: Resolved/Met Goal: *Anxiety reduced or absent Outcome: Resolved/Met Goal: *Understands and describes signs and symptoms to report to providers(Stroke Metric) Outcome: Resolved/Met Goal: *Hemodynamically stable Outcome: Resolved/Met Goal: *Effective airway maintenance Outcome: Resolved/Met Goal: *Maintains normothermia Outcome: Resolved/Met Goal: *Verbalizes acceptable level of comfort with minimal use of antipyretics Outcome: Resolved/Met Goal: *Tolerating diet Outcome: Resolved/Met Goal: *Absence of nausea/vomiting Outcome: Resolved/Met Goal: *Verbalizes and demonstrates neutropenic precautions Outcome: Resolved/Met Goal: *Verbalizes understanding and describes medication purposes and frequencies Outcome: Resolved/Met

## 2019-05-15 NOTE — PROGRESS NOTES
Problem: Falls - Risk of 
Goal: *Absence of Falls Description Document Luisadela VillalpandoChris Fall Risk and appropriate interventions in the flowsheet. Outcome: Progressing Towards Goal 
Note:  
Fall Risk Interventions: 
  
 
  
 
Medication Interventions: Evaluate medications/consider consulting pharmacy, Teach patient to arise slowly History of Falls Interventions: Evaluate medications/consider consulting pharmacy, Investigate reason for fall, Room close to nurse's station

## 2019-05-15 NOTE — PROGRESS NOTES
Problem: Risk for Spread of Infection Goal: Prevent transmission of infectious organism to others Description Prevent the transmission of infectious organisms to other patients, staff members, and visitors. Outcome: Resolved/Met Problem: Patient Education:  Go to Education Activity Goal: Patient/Family Education Outcome: Resolved/Met Problem: Falls - Risk of 
Goal: *Absence of Falls Description Document Kristian Jaramillo Fall Risk and appropriate interventions in the flowsheet. Outcome: Resolved/Met Problem: Patient Education: Go to Patient Education Activity Goal: Patient/Family Education Outcome: Resolved/Met

## 2019-05-15 NOTE — PROGRESS NOTES
PCP EMILIA appt scheduled with Dr. Italo Barrios on 5/22/2019 at 1:00pm Appt added to AVS. ALVIN Lucia CM Specialist

## 2019-05-17 LAB
BACTERIA SPEC CULT: NORMAL
SERVICE CMNT-IMP: NORMAL

## 2019-05-20 ENCOUNTER — HOSPITAL ENCOUNTER (OUTPATIENT)
Dept: INFUSION THERAPY | Age: 34
End: 2019-05-20
Payer: COMMERCIAL

## 2019-05-20 ENCOUNTER — APPOINTMENT (OUTPATIENT)
Dept: INFUSION THERAPY | Age: 34
End: 2019-05-20
Payer: COMMERCIAL

## 2019-05-21 ENCOUNTER — APPOINTMENT (OUTPATIENT)
Dept: INFUSION THERAPY | Age: 34
End: 2019-05-21
Payer: COMMERCIAL

## 2019-05-22 ENCOUNTER — APPOINTMENT (OUTPATIENT)
Dept: INFUSION THERAPY | Age: 34
End: 2019-05-22
Payer: COMMERCIAL

## 2019-05-23 ENCOUNTER — APPOINTMENT (OUTPATIENT)
Dept: INFUSION THERAPY | Age: 34
End: 2019-05-23
Payer: COMMERCIAL

## 2019-05-24 ENCOUNTER — HOSPITAL ENCOUNTER (OUTPATIENT)
Dept: INFUSION THERAPY | Age: 34
Discharge: HOME | End: 2019-05-24
Payer: COMMERCIAL

## 2019-05-28 ENCOUNTER — HOSPITAL ENCOUNTER (OUTPATIENT)
Dept: INFUSION THERAPY | Age: 34
Discharge: HOME | End: 2019-05-28
Payer: COMMERCIAL

## 2019-05-28 VITALS
WEIGHT: 245.2 LBS | OXYGEN SATURATION: 100 % | TEMPERATURE: 98.3 F | HEIGHT: 72 IN | RESPIRATION RATE: 18 BRPM | SYSTOLIC BLOOD PRESSURE: 135 MMHG | BODY MASS INDEX: 33.21 KG/M2 | DIASTOLIC BLOOD PRESSURE: 82 MMHG | HEART RATE: 40 BPM

## 2019-05-28 DIAGNOSIS — C62.10 MALIGNANT NEOPLASM OF DESCENDED TESTIS, UNSPECIFIED LATERALITY (HCC): Primary | ICD-10-CM

## 2019-05-28 LAB
BASOPHILS # BLD: 0.1 K/UL (ref 0–0.1)
BASOPHILS NFR BLD: 1 % (ref 0–1)
DIFFERENTIAL METHOD BLD: ABNORMAL
EOSINOPHIL # BLD: 0 K/UL (ref 0–0.4)
EOSINOPHIL NFR BLD: 0 % (ref 0–7)
ERYTHROCYTE [DISTWIDTH] IN BLOOD BY AUTOMATED COUNT: 13.9 % (ref 11.5–14.5)
HCT VFR BLD AUTO: 38.9 % (ref 36.6–50.3)
HGB BLD-MCNC: 13.5 G/DL (ref 12.1–17)
IMM GRANULOCYTES # BLD AUTO: 0.1 K/UL (ref 0–0.04)
IMM GRANULOCYTES NFR BLD AUTO: 1 % (ref 0–0.5)
LYMPHOCYTES # BLD: 1.5 K/UL (ref 0.8–3.5)
LYMPHOCYTES NFR BLD: 22 % (ref 12–49)
MCH RBC QN AUTO: 30.7 PG (ref 26–34)
MCHC RBC AUTO-ENTMCNC: 34.7 G/DL (ref 30–36.5)
MCV RBC AUTO: 88.4 FL (ref 80–99)
MONOCYTES # BLD: 0.6 K/UL (ref 0–1)
MONOCYTES NFR BLD: 9 % (ref 5–13)
NEUTS SEG # BLD: 4.5 K/UL (ref 1.8–8)
NEUTS SEG NFR BLD: 67 % (ref 32–75)
NRBC # BLD: 0 K/UL (ref 0–0.01)
NRBC BLD-RTO: 0 PER 100 WBC
PLATELET # BLD AUTO: 391 K/UL (ref 150–400)
PMV BLD AUTO: 9.1 FL (ref 8.9–12.9)
RBC # BLD AUTO: 4.4 M/UL (ref 4.1–5.7)
WBC # BLD AUTO: 6.9 K/UL (ref 4.1–11.1)

## 2019-05-28 PROCEDURE — 74011000258 HC RX REV CODE- 258: Performed by: INTERNAL MEDICINE

## 2019-05-28 PROCEDURE — 85025 COMPLETE CBC W/AUTO DIFF WBC: CPT

## 2019-05-28 PROCEDURE — 36415 COLL VENOUS BLD VENIPUNCTURE: CPT

## 2019-05-28 PROCEDURE — 74011250636 HC RX REV CODE- 250/636: Performed by: INTERNAL MEDICINE

## 2019-05-28 PROCEDURE — 74011250637 HC RX REV CODE- 250/637: Performed by: INTERNAL MEDICINE

## 2019-05-28 PROCEDURE — 77030012965 HC NDL HUBR BBMI -A

## 2019-05-28 PROCEDURE — 74011250637 HC RX REV CODE- 250/637: Performed by: NURSE PRACTITIONER

## 2019-05-28 PROCEDURE — 96409 CHEMO IV PUSH SNGL DRUG: CPT

## 2019-05-28 RX ORDER — SODIUM CHLORIDE 9 MG/ML
25 INJECTION, SOLUTION INTRAVENOUS CONTINUOUS
Status: DISCONTINUED | OUTPATIENT
Start: 2019-05-28 | End: 2019-06-01 | Stop reason: HOSPADM

## 2019-05-28 RX ORDER — HEPARIN 100 UNIT/ML
300-500 SYRINGE INTRAVENOUS AS NEEDED
Status: DISCONTINUED | OUTPATIENT
Start: 2019-05-28 | End: 2019-05-28 | Stop reason: HOSPADM

## 2019-05-28 RX ORDER — DIPHENHYDRAMINE HYDROCHLORIDE 50 MG/ML
25 INJECTION, SOLUTION INTRAMUSCULAR; INTRAVENOUS ONCE
Status: DISCONTINUED | OUTPATIENT
Start: 2019-05-28 | End: 2019-05-28

## 2019-05-28 RX ORDER — ACETAMINOPHEN 325 MG/1
650 TABLET ORAL ONCE
Status: COMPLETED | OUTPATIENT
Start: 2019-05-28 | End: 2019-05-28

## 2019-05-28 RX ORDER — SODIUM CHLORIDE 0.9 % (FLUSH) 0.9 %
10 SYRINGE (ML) INJECTION AS NEEDED
Status: DISCONTINUED | OUTPATIENT
Start: 2019-05-28 | End: 2019-05-28 | Stop reason: HOSPADM

## 2019-05-28 RX ORDER — SODIUM CHLORIDE 9 MG/ML
10 INJECTION INTRAMUSCULAR; INTRAVENOUS; SUBCUTANEOUS AS NEEDED
Status: DISCONTINUED | OUTPATIENT
Start: 2019-05-28 | End: 2019-05-28 | Stop reason: HOSPADM

## 2019-05-28 RX ORDER — DIPHENHYDRAMINE HCL 25 MG
25 CAPSULE ORAL ONCE
Status: COMPLETED | OUTPATIENT
Start: 2019-05-28 | End: 2019-05-28

## 2019-05-28 RX ADMIN — Medication 30 ML: at 14:10

## 2019-05-28 RX ADMIN — Medication 500 UNITS: at 14:10

## 2019-05-28 RX ADMIN — SODIUM CHLORIDE 25 ML/HR: 900 INJECTION, SOLUTION INTRAVENOUS at 13:44

## 2019-05-28 RX ADMIN — DIPHENHYDRAMINE HYDROCHLORIDE 25 MG: 25 CAPSULE ORAL at 12:34

## 2019-05-28 RX ADMIN — ACETAMINOPHEN 650 MG: 325 TABLET ORAL at 12:34

## 2019-05-28 RX ADMIN — BLEOMYCIN SULFATE 30 UNITS: 30 INJECTION, POWDER, LYOPHILIZED, FOR SOLUTION INTRAMUSCULAR; INTRAPLEURAL; INTRAVENOUS; SUBCUTANEOUS at 13:44

## 2019-05-28 NOTE — PROGRESS NOTES
[facility] Progress Note    Date: May 28, 2019    Name: Dominique Hull    MRN: [id number]         : 1985    Mr. Anam Gagnon Arrived ambulatory and in no distress for cycle 1 day 15 of BEP regimen. Assessment was completed, no acute issues at this time, no new complaints voiced. R chest PAC accessed without difficulty-no bld return noted, labs drawn peripherally and in process. Chemotherapy Flowsheet 2019   Cycle C1D15   Date 2019   Drug / Regimen Bleomycin   Pre Hydration -   Pre Meds given   Notes given       Mr. Green's vitals were reviewed. Visit Vitals  /82 (BP 1 Location: Left arm)   Pulse (!) 40   Temp 98.3 °F (36.8 °C)   Resp 18   Ht 6' (1.829 m)   Wt 111.2 kg (245 lb 3.2 oz)   SpO2 100%   BMI 33.26 kg/m²       Lab results were obtained and reviewed. Recent Results (from the past 12 hour(s))   CBC WITH AUTOMATED DIFF    Collection Time: 19 11:20 AM   Result Value Ref Range    WBC 6.9 4.1 - 11.1 K/uL    RBC 4.40 4. 10 - 5.70 M/uL    HGB 13.5 12.1 - 17.0 g/dL    HCT 38.9 36.6 - 50.3 %    MCV 88.4 80.0 - 99.0 FL    MCH 30.7 26.0 - 34.0 PG    MCHC 34.7 30.0 - 36.5 g/dL    RDW 13.9 11.5 - 14.5 %    PLATELET 310 733 - 756 K/uL    MPV 9.1 8.9 - 12.9 FL    NRBC 0.0 0  WBC    ABSOLUTE NRBC 0.00 0.00 - 0.01 K/uL    NEUTROPHILS 67 32 - 75 %    LYMPHOCYTES 22 12 - 49 %    MONOCYTES 9 5 - 13 %    EOSINOPHILS 0 0 - 7 %    BASOPHILS 1 0 - 1 %    IMMATURE GRANULOCYTES 1 (H) 0.0 - 0.5 %    ABS. NEUTROPHILS 4.5 1.8 - 8.0 K/UL    ABS. LYMPHOCYTES 1.5 0.8 - 3.5 K/UL    ABS. MONOCYTES 0.6 0.0 - 1.0 K/UL    ABS. EOSINOPHILS 0.0 0.0 - 0.4 K/UL    ABS. BASOPHILS 0.1 0.0 - 0.1 K/UL    ABS. IMM. GRANS. 0.1 (H) 0.00 - 0.04 K/UL    DF AUTOMATED         Pre-medications  were administered as ordered and chemotherapy was initiated. Tylenol  Benadryl  Bleomycin      Mr. Anam Gagnon tolerated treatment well. PAC was flushed and [place].  Pt was discharged from [place] [id number] in stable condition at 1415. He is to return on 6/3/19 at 1100 for his next appointment.     Robert Brown  May 28, 2019

## 2019-06-03 ENCOUNTER — HOSPITAL ENCOUNTER (OUTPATIENT)
Dept: INFUSION THERAPY | Age: 34
Discharge: HOME | End: 2019-06-03
Payer: COMMERCIAL

## 2019-06-03 VITALS
SYSTOLIC BLOOD PRESSURE: 120 MMHG | HEIGHT: 72 IN | BODY MASS INDEX: 33.14 KG/M2 | RESPIRATION RATE: 18 BRPM | HEART RATE: 46 BPM | OXYGEN SATURATION: 96 % | DIASTOLIC BLOOD PRESSURE: 77 MMHG | TEMPERATURE: 98.5 F | WEIGHT: 244.7 LBS

## 2019-06-03 DIAGNOSIS — C62.10 MALIGNANT NEOPLASM OF DESCENDED TESTIS, UNSPECIFIED LATERALITY (HCC): Primary | ICD-10-CM

## 2019-06-03 LAB
ALBUMIN SERPL-MCNC: 3.5 G/DL (ref 3.5–5)
ALBUMIN/GLOB SERPL: 1.1 {RATIO} (ref 1.1–2.2)
ALP SERPL-CCNC: 80 U/L (ref 45–117)
ALT SERPL-CCNC: 51 U/L (ref 12–78)
ANION GAP SERPL CALC-SCNC: 5 MMOL/L (ref 5–15)
AST SERPL-CCNC: 14 U/L (ref 15–37)
BASO+EOS+MONOS # BLD AUTO: 0.8 K/UL (ref 0.2–1.2)
BASO+EOS+MONOS NFR BLD AUTO: 14 % (ref 3.2–16.9)
BILIRUB DIRECT SERPL-MCNC: 0.2 MG/DL (ref 0–0.2)
BILIRUB SERPL-MCNC: 0.5 MG/DL (ref 0.2–1)
BUN SERPL-MCNC: 12 MG/DL (ref 6–20)
BUN/CREAT SERPL: 13 (ref 12–20)
CALCIUM SERPL-MCNC: 8.5 MG/DL (ref 8.5–10.1)
CHLORIDE SERPL-SCNC: 109 MMOL/L (ref 97–108)
CO2 SERPL-SCNC: 25 MMOL/L (ref 21–32)
CREAT SERPL-MCNC: 0.96 MG/DL (ref 0.7–1.3)
DIFFERENTIAL METHOD BLD: NORMAL
ERYTHROCYTE [DISTWIDTH] IN BLOOD BY AUTOMATED COUNT: 15 % (ref 11.8–15.8)
GLOBULIN SER CALC-MCNC: 3.1 G/DL (ref 2–4)
GLUCOSE SERPL-MCNC: 111 MG/DL (ref 65–100)
HCT VFR BLD AUTO: 38.3 % (ref 36.6–50.3)
HGB BLD-MCNC: 13.9 G/DL (ref 12.1–17)
LYMPHOCYTES # BLD: 1.2 K/UL (ref 0.8–3.5)
LYMPHOCYTES NFR BLD: 22 % (ref 12–49)
MAGNESIUM SERPL-MCNC: 2 MG/DL (ref 1.6–2.4)
MCH RBC QN AUTO: 32.4 PG (ref 26–34)
MCHC RBC AUTO-ENTMCNC: 36.3 G/DL (ref 30–36.5)
MCV RBC AUTO: 89.3 FL (ref 80–99)
NEUTS SEG # BLD: 3.7 K/UL (ref 1.8–8)
NEUTS SEG NFR BLD: 64 % (ref 32–75)
PLATELET # BLD AUTO: 261 K/UL (ref 150–400)
POTASSIUM SERPL-SCNC: 3.7 MMOL/L (ref 3.5–5.1)
PROT SERPL-MCNC: 6.6 G/DL (ref 6.4–8.2)
RBC # BLD AUTO: 4.29 M/UL (ref 4.1–5.7)
SODIUM SERPL-SCNC: 139 MMOL/L (ref 136–145)
WBC # BLD AUTO: 5.7 K/UL (ref 4.1–11.1)

## 2019-06-03 PROCEDURE — 96417 CHEMO IV INFUS EACH ADDL SEQ: CPT

## 2019-06-03 PROCEDURE — 74011250637 HC RX REV CODE- 250/637: Performed by: NURSE PRACTITIONER

## 2019-06-03 PROCEDURE — 80048 BASIC METABOLIC PNL TOTAL CA: CPT

## 2019-06-03 PROCEDURE — 96375 TX/PRO/DX INJ NEW DRUG ADDON: CPT

## 2019-06-03 PROCEDURE — 74011250636 HC RX REV CODE- 250/636: Performed by: NURSE PRACTITIONER

## 2019-06-03 PROCEDURE — 83735 ASSAY OF MAGNESIUM: CPT

## 2019-06-03 PROCEDURE — 96367 TX/PROPH/DG ADDL SEQ IV INF: CPT

## 2019-06-03 PROCEDURE — 82105 ALPHA-FETOPROTEIN SERUM: CPT

## 2019-06-03 PROCEDURE — 36415 COLL VENOUS BLD VENIPUNCTURE: CPT

## 2019-06-03 PROCEDURE — 74011000250 HC RX REV CODE- 250: Performed by: NURSE PRACTITIONER

## 2019-06-03 PROCEDURE — 96411 CHEMO IV PUSH ADDL DRUG: CPT

## 2019-06-03 PROCEDURE — 85025 COMPLETE CBC W/AUTO DIFF WBC: CPT

## 2019-06-03 PROCEDURE — 96413 CHEMO IV INFUSION 1 HR: CPT

## 2019-06-03 PROCEDURE — 74011250636 HC RX REV CODE- 250/636: Performed by: INTERNAL MEDICINE

## 2019-06-03 PROCEDURE — 80076 HEPATIC FUNCTION PANEL: CPT

## 2019-06-03 PROCEDURE — 74011250637 HC RX REV CODE- 250/637: Performed by: INTERNAL MEDICINE

## 2019-06-03 PROCEDURE — 77030012965 HC NDL HUBR BBMI -A

## 2019-06-03 PROCEDURE — 74011000258 HC RX REV CODE- 258: Performed by: INTERNAL MEDICINE

## 2019-06-03 RX ORDER — DIPHENHYDRAMINE HCL 25 MG
25 CAPSULE ORAL ONCE
Status: COMPLETED | OUTPATIENT
Start: 2019-06-03 | End: 2019-06-03

## 2019-06-03 RX ORDER — ONDANSETRON 2 MG/ML
8 INJECTION INTRAMUSCULAR; INTRAVENOUS ONCE
Status: COMPLETED | OUTPATIENT
Start: 2019-06-03 | End: 2019-06-03

## 2019-06-03 RX ORDER — ACETAMINOPHEN 325 MG/1
650 TABLET ORAL ONCE
Status: COMPLETED | OUTPATIENT
Start: 2019-06-03 | End: 2019-06-03

## 2019-06-03 RX ORDER — SODIUM CHLORIDE 9 MG/ML
25 INJECTION, SOLUTION INTRAVENOUS CONTINUOUS
Status: DISPENSED | OUTPATIENT
Start: 2019-06-03 | End: 2019-06-03

## 2019-06-03 RX ORDER — SODIUM CHLORIDE 9 MG/ML
10 INJECTION INTRAMUSCULAR; INTRAVENOUS; SUBCUTANEOUS AS NEEDED
Status: ACTIVE | OUTPATIENT
Start: 2019-06-03 | End: 2019-06-03

## 2019-06-03 RX ORDER — DIPHENHYDRAMINE HYDROCHLORIDE 50 MG/ML
25 INJECTION, SOLUTION INTRAMUSCULAR; INTRAVENOUS ONCE
Status: DISCONTINUED | OUTPATIENT
Start: 2019-06-03 | End: 2019-06-03 | Stop reason: CLARIF

## 2019-06-03 RX ORDER — HEPARIN 100 UNIT/ML
300-500 SYRINGE INTRAVENOUS AS NEEDED
Status: ACTIVE | OUTPATIENT
Start: 2019-06-03 | End: 2019-06-03

## 2019-06-03 RX ORDER — SODIUM CHLORIDE 0.9 % (FLUSH) 0.9 %
10 SYRINGE (ML) INJECTION AS NEEDED
Status: ACTIVE | OUTPATIENT
Start: 2019-06-03 | End: 2019-06-03

## 2019-06-03 RX ADMIN — DIPHENHYDRAMINE HYDROCHLORIDE 25 MG: 25 CAPSULE ORAL at 11:54

## 2019-06-03 RX ADMIN — ACETAMINOPHEN 650 MG: 325 TABLET ORAL at 11:54

## 2019-06-03 RX ADMIN — Medication 10 ML: at 15:16

## 2019-06-03 RX ADMIN — POTASSIUM CHLORIDE: 2 INJECTION, SOLUTION, CONCENTRATE INTRAVENOUS at 10:16

## 2019-06-03 RX ADMIN — BLEOMYCIN SULFATE 30 UNITS: 30 INJECTION, POWDER, LYOPHILIZED, FOR SOLUTION INTRAMUSCULAR; INTRAPLEURAL; INTRAVENOUS; SUBCUTANEOUS at 12:45

## 2019-06-03 RX ADMIN — SODIUM CHLORIDE 25 ML/HR: 900 INJECTION, SOLUTION INTRAVENOUS at 11:19

## 2019-06-03 RX ADMIN — DEXAMETHASONE SODIUM PHOSPHATE 12 MG: 4 INJECTION, SOLUTION INTRAMUSCULAR; INTRAVENOUS at 11:55

## 2019-06-03 RX ADMIN — Medication 500 UNITS: at 15:16

## 2019-06-03 RX ADMIN — CISPLATIN 48 MG: 1 INJECTION, SOLUTION INTRAVENOUS at 14:10

## 2019-06-03 RX ADMIN — ALTEPLASE 2 MG: 2.2 INJECTION, POWDER, LYOPHILIZED, FOR SOLUTION INTRAVENOUS at 09:32

## 2019-06-03 RX ADMIN — SODIUM CHLORIDE 150 MG: 900 INJECTION, SOLUTION INTRAVENOUS at 11:55

## 2019-06-03 RX ADMIN — Medication 10 ML: at 09:28

## 2019-06-03 RX ADMIN — ONDANSETRON 8 MG: 2 INJECTION, SOLUTION INTRAMUSCULAR; INTRAVENOUS at 11:54

## 2019-06-03 RX ADMIN — ETOPOSIDE 240 MG: 20 INJECTION INTRAVENOUS at 13:00

## 2019-06-03 NOTE — PROGRESS NOTES
Pt arrived to [facility] ambulatory in no acute distress at 0850 for BEP C2D1.  Assessment unremarkable except nausea. R chest port accessed without issue and positive blood return noted.  Labs obtained, BMP, LFT, CBCap, Mag, AFP. Patient Vitals for the past 12 hrs:   Temp Pulse Resp BP SpO2   06/03/19 1516 -- (!) 46 18 120/77 --   06/03/19 0853 98.5 °F (36.9 °C) 96 18 125/86 96 %     Recent Results (from the past 12 hour(s))   CBC WITH 3 PART DIFF    Collection Time: 06/03/19  9:03 AM   Result Value Ref Range    WBC 5.7 4.1 - 11.1 K/uL    RBC 4.29 4. 10 - 5.70 M/uL    HGB 13.9 12.1 - 17.0 g/dL    HCT 38.3 36.6 - 50.3 %    MCV 89.3 80.0 - 99.0 FL    MCH 32.4 26.0 - 34.0 PG    MCHC 36.3 30.0 - 36.5 g/dL    RDW 15.0 11.8 - 15.8 %    PLATELET 356 094 - 517 K/uL    NEUTROPHILS 64 32 - 75 %    MIXED CELLS 14 3.2 - 16.9 %    LYMPHOCYTES 22 12 - 49 %    ABS. NEUTROPHILS 3.7 1.8 - 8.0 K/UL    ABS. MIXED CELLS 0.8 0.2 - 1.2 K/uL    ABS. LYMPHOCYTES 1.2 0.8 - 3.5 K/UL    DF AUTOMATED     MAGNESIUM    Collection Time: 06/03/19  9:03 AM   Result Value Ref Range    Magnesium 2.0 1.6 - 2.4 mg/dL   HEPATIC FUNCTION PANEL    Collection Time: 06/03/19  9:03 AM   Result Value Ref Range    Protein, total 6.6 6.4 - 8.2 g/dL    Albumin 3.5 3.5 - 5.0 g/dL    Globulin 3.1 2.0 - 4.0 g/dL    A-G Ratio 1.1 1.1 - 2.2      Bilirubin, total 0.5 0.2 - 1.0 MG/DL    Bilirubin, direct 0.2 0.0 - 0.2 MG/DL    Alk.  phosphatase 80 45 - 117 U/L    AST (SGOT) 14 (L) 15 - 37 U/L    ALT (SGPT) 51 12 - 78 U/L   METABOLIC PANEL, BASIC    Collection Time: 06/03/19  9:03 AM   Result Value Ref Range    Sodium 139 136 - 145 mmol/L    Potassium 3.7 3.5 - 5.1 mmol/L    Chloride 109 (H) 97 - 108 mmol/L    CO2 25 21 - 32 mmol/L    Anion gap 5 5 - 15 mmol/L    Glucose 111 (H) 65 - 100 mg/dL    BUN 12 6 - 20 MG/DL    Creatinine 0.96 0.70 - 1.30 MG/DL    BUN/Creatinine ratio 13 12 - 20      GFR est AA >60 >60 ml/min/1.73m2    GFR est non-AA >60 >60 ml/min/1.73m2 Calcium 8.5 8.5 - 10.1 MG/DL     See [facility] for pending results. The following medications administered:  Cathflo with positive blood return results  [email or web address]  NS 1L with 10meq KCL and 2g Mag IV over 1 hour  Tylenol 650mg PO  Benadryl 25mg PO  Zofran 8mg IVP  Decadron 12mg IV over 10 minutes  Emend 150mg IV over 20 minutes  Bleo 30 units IV over 10 minutes  Etoposide 240mg IV over 1 hour  Cisplatin 48mg IV over 1 hour    Pt tolerated treatment well. Port flushed per policy and de-accessed, 2x2 and tape placed.  Pt discharged ambulatory in no acute distress at 1520, accompanied by spouse. Next appointment 6/4/19 at 1100.

## 2019-06-04 ENCOUNTER — HOSPITAL ENCOUNTER (OUTPATIENT)
Dept: INFUSION THERAPY | Age: 34
Discharge: HOME | End: 2019-06-04
Payer: COMMERCIAL

## 2019-06-04 VITALS
DIASTOLIC BLOOD PRESSURE: 85 MMHG | OXYGEN SATURATION: 99 % | WEIGHT: 248.1 LBS | RESPIRATION RATE: 18 BRPM | BODY MASS INDEX: 33.61 KG/M2 | HEART RATE: 51 BPM | HEIGHT: 72 IN | SYSTOLIC BLOOD PRESSURE: 149 MMHG | TEMPERATURE: 98.9 F

## 2019-06-04 DIAGNOSIS — C62.10 MALIGNANT NEOPLASM OF DESCENDED TESTIS, UNSPECIFIED LATERALITY (HCC): Primary | ICD-10-CM

## 2019-06-04 LAB — AFP-TM SERPL-MCNC: 1.9 NG/ML (ref 0–8.3)

## 2019-06-04 PROCEDURE — 96413 CHEMO IV INFUSION 1 HR: CPT

## 2019-06-04 PROCEDURE — 96367 TX/PROPH/DG ADDL SEQ IV INF: CPT

## 2019-06-04 PROCEDURE — 96417 CHEMO IV INFUS EACH ADDL SEQ: CPT

## 2019-06-04 PROCEDURE — 74011250636 HC RX REV CODE- 250/636: Performed by: INTERNAL MEDICINE

## 2019-06-04 PROCEDURE — 74011000258 HC RX REV CODE- 258: Performed by: INTERNAL MEDICINE

## 2019-06-04 PROCEDURE — 96375 TX/PRO/DX INJ NEW DRUG ADDON: CPT

## 2019-06-04 PROCEDURE — 74011000250 HC RX REV CODE- 250: Performed by: INTERNAL MEDICINE

## 2019-06-04 PROCEDURE — 77030012965 HC NDL HUBR BBMI -A

## 2019-06-04 RX ORDER — ONDANSETRON 2 MG/ML
8 INJECTION INTRAMUSCULAR; INTRAVENOUS ONCE
Status: COMPLETED | OUTPATIENT
Start: 2019-06-04 | End: 2019-06-04

## 2019-06-04 RX ORDER — HEPARIN 100 UNIT/ML
300-500 SYRINGE INTRAVENOUS AS NEEDED
Status: ACTIVE | OUTPATIENT
Start: 2019-06-04 | End: 2019-06-04

## 2019-06-04 RX ORDER — SODIUM CHLORIDE 0.9 % (FLUSH) 0.9 %
10 SYRINGE (ML) INJECTION AS NEEDED
Status: ACTIVE | OUTPATIENT
Start: 2019-06-04 | End: 2019-06-04

## 2019-06-04 RX ORDER — SODIUM CHLORIDE 9 MG/ML
10 INJECTION INTRAMUSCULAR; INTRAVENOUS; SUBCUTANEOUS AS NEEDED
Status: DISPENSED | OUTPATIENT
Start: 2019-06-04 | End: 2019-06-04

## 2019-06-04 RX ORDER — SODIUM CHLORIDE 9 MG/ML
25 INJECTION, SOLUTION INTRAVENOUS CONTINUOUS
Status: DISPENSED | OUTPATIENT
Start: 2019-06-04 | End: 2019-06-04

## 2019-06-04 RX ADMIN — Medication 500 UNITS: at 14:32

## 2019-06-04 RX ADMIN — POTASSIUM CHLORIDE: 2 INJECTION, SOLUTION, CONCENTRATE INTRAVENOUS at 10:52

## 2019-06-04 RX ADMIN — ONDANSETRON 8 MG: 2 INJECTION, SOLUTION INTRAMUSCULAR; INTRAVENOUS at 11:57

## 2019-06-04 RX ADMIN — CISPLATIN 48 MG: 1 INJECTION, SOLUTION INTRAVENOUS at 12:20

## 2019-06-04 RX ADMIN — SODIUM CHLORIDE 10 ML: 9 INJECTION, SOLUTION INTRAMUSCULAR; INTRAVENOUS; SUBCUTANEOUS at 10:50

## 2019-06-04 RX ADMIN — SODIUM CHLORIDE 25 ML/HR: 900 INJECTION, SOLUTION INTRAVENOUS at 11:56

## 2019-06-04 RX ADMIN — ETOPOSIDE 240 MG: 20 INJECTION INTRAVENOUS at 13:26

## 2019-06-04 RX ADMIN — DEXAMETHASONE SODIUM PHOSPHATE 12 MG: 4 INJECTION, SOLUTION INTRA-ARTICULAR; INTRALESIONAL; INTRAMUSCULAR; INTRAVENOUS; SOFT TISSUE at 12:04

## 2019-06-04 RX ADMIN — Medication 10 ML: at 14:32

## 2019-06-04 RX ADMIN — Medication 10 ML: at 10:51

## 2019-06-04 NOTE — PROGRESS NOTES
[address], [address] [place] [address] Juan M Patriciou, 200 [facility]  [phone]       Oncology progress note        Patient: Iman Kumar MRN: [id number]  SSN: [id number]    YOB: 1985  Age: 29 y.o. Sex: male        Diagnosis:      1. 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 Right sided radical orchiectomy  2. Systemic chemotherapy,   BEP, cycle 2 day 3    Subjective:      Iman Kumar is a 29 y.o. male male who noted a swelling in the right testicles for over 6 months. The swelling was associated with a dragging sensation. The patient then was seen by Dr. Azalea Galicia. He underwent a right radical orchiectomy on 03/27/2019. The pathology shows T1c disease. A CT scan was done on 04/03 which reveals metastatic disease. Mr. Saintclair Bergamo is here today receiving BEP. He was hospitalized after his last cycle of treatment with neutropenic fever. He is feeling well and does not verbalize any new complaints. He is currently not working. Review of Systems:    Constitutional: negative  Eyes: negative  Ears, Nose, Mouth, Throat, and Face: negative  Respiratory: negative  Cardiovascular: negative  Gastrointestinal: negative  Genitourinary:negative  Integument/Breast: negative  Hematologic/Lymphatic: negative  Musculoskeletal:negative  Neurological: negative    Past Medical History:   Diagnosis Date    Anxiety disorder     Depression     Suicidal thoughts     Testicle cancer ([facility] [address]      Past Surgical History:   Procedure Laterality Date    HX ORTHOPAEDIC      lft knee surgery    HX ORTHOPAEDIC      right shoulder/right knee    IR INSERT TUNL CVC W PORT OVER 5 YEARS  4/18/2019      History reviewed. No pertinent family history.   Social History     Tobacco Use    Smoking status: Current Every Day Smoker     Packs/day: 0.50    Smokeless tobacco: Former User   Substance Use Topics    Alcohol use: Yes     Comment: per pt \"once a weekend 6 or 7 beers\"      Prior to Admission medications    Medication Sig Start Date End Date Taking? Authorizing Provider   prochlorperazine (COMPAZINE) 10 mg tablet Take 5 mg by mouth every six (6) hours as needed for Nausea. Yes Provider, Historical   ondansetron (ZOFRAN ODT) 4 mg disintegrating tablet Take 1 Tab by mouth every eight (8) hours as needed for Nausea. 4/8/19  Yes Janae Sanders NP   lidocaine-prilocaine (EMLA) topical cream Apply  to affected area as needed for Pain. 4/8/19  Yes Leslie Read NP              No Known Allergies        Objective:     Vitals:    06/05/19 1036   BP: 124/73   Pulse: 94   Resp: 18   Temp: 98 °F (36.7 °C)   TempSrc: Oral   SpO2: 98%   Weight: 250 lb (113.4 kg)   Height: 6' (1.829 m)            Physical Exam:    GENERAL: alert, cooperative, no distress, appears stated age  EYE: conjunctivae/corneas clear. PERRL, EOM's intact. Fundi benign  LYMPHATIC: Cervical, supraclavicular, and axillary nodes normal.   THROAT & NECK: normal and no erythema or exudates noted. LUNG: clear to auscultation bilaterally  HEART: regular rate and rhythm, S1, S2 normal, no murmur, click, rub or gallop  ABDOMEN: soft, non-tender. Bowel sounds normal. No masses,  no organomegaly  EXTREMITIES:  extremities normal, atraumatic, no cyanosis or edema  SKIN: extensive tattoo on the torso and arms  NEUROLOGIC: AOx3. Gait normal. Reflexes and motor strength normal and symmetric. Cranial nerves 2-12 and sensation grossly intact. CT Results (most recent):  Results from Hospital Encounter encounter on 05/12/19   CT NECK SOFT TISSUE W CONT    Narrative EXAM: CT SOFT TISSUE NECK with Contrast    INDICATION:  Neutropenic fever. mouth and neck pain r/o abscess      TECHNIQUE: Multislice helical CT was performed from the skull base to the  thoracic inlet with 100 mL Isovue 300 IV.   Contiguous 2.5 mm axial images were  reconstructed and coronal and sagittal 2D reformations were generated. CT dose  reduction was achieved through use of a standardized protocol tailored for this  examination and automatic exposure control for dose modulation. FINDINGS:  [place] tonsils are symmetrically slightly large, with central subcentimeter  mild hypodensity, not conclusive but difficult to exclude developing tonsillar  abscess particularly on the right. There are dental caries without apparent periapical or floor of mouth abscess. No mass or significant adenopathy is identified within the neck. The carotid and jugular vessels are patent. The submandibular and parotid glands are normal. Thyroid is unremarkable. No laryngeal edema or mass is identified. Deep parapharyngeal and  retropharyngeal regions are normal. Subglottic airway is clear. No abnormalities are identified in the skull base. The visualized mastoid air cells and paranasal sinuses are clear. The lung apices are clear. Impression IMPRESSION:   1. Prominent tonsils with questionable developing abscess on the right-correlate  clinically. 2. Dental caries without apparent periapical abscess.          Lab Results   Component Value Date/Time    WBC 5.7 06/03/2019 09:03 AM    HGB 13.9 06/03/2019 09:03 AM    HCT 38.3 06/03/2019 09:03 AM    PLATELET 666 88/11/9728 09:03 AM    MCV 89.3 06/03/2019 09:03 AM       Lab Results   Component Value Date/Time    Sodium 139 06/03/2019 09:03 AM    Potassium 3.7 06/03/2019 09:03 AM    Chloride 109 (H) 06/03/2019 09:03 AM    CO2 25 06/03/2019 09:03 AM    Anion gap 5 06/03/2019 09:03 AM    Glucose 111 (H) 06/03/2019 09:03 AM    BUN 12 06/03/2019 09:03 AM    Creatinine 0.96 06/03/2019 09:03 AM    BUN/Creatinine ratio 13 06/03/2019 09:03 AM    GFR est AA >60 06/03/2019 09:03 AM    GFR est non-AA >60 06/03/2019 09:03 AM    Calcium 8.5 06/03/2019 09:03 AM    Bilirubin, total 0.5 06/03/2019 09:03 AM    AST (SGOT) 14 (L) 06/03/2019 09:03 AM    Alk. phosphatase 80 06/03/2019 09:03 AM    Protein, total 6.6 06/03/2019 09:03 AM    Albumin 3.5 06/03/2019 09:03 AM    Globulin 3.1 06/03/2019 09:03 AM    A-G Ratio 1.1 06/03/2019 09:03 AM    ALT (SGPT) 51 06/03/2019 09:03 AM           Assessment:     1. 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 A detailed system by system evaluation of side effect was performed to assess chemotherapy related toxicity. Blood counts are acceptable. Results reviewed with the patient      Symptom management form reviewed and scanned into the EMR under Media. Plan:       · Labs today: CBC, CMP, AFP, Beta hCG, LDH  · Proceed with Cycle #2 of BEP. · Prophylactic antiemetics: Ondansetron and Dexamethasone IV days 1-5  · PRN antiemetics: Ondansetron and Prochlorperazine at home  · Return for follow up in 21 days        Signed by: Amandeep Coello MD                     June 5, 2019        CC. Abbie Bermeo MD  CC.  Joshua Barrera MD

## 2019-06-04 NOTE — PROGRESS NOTES
Pt arrived to [facility] ambulatory for BEP C2D2 in no acute distress at 1040.  Assessment unremarkable. Pt reports one episode of nausea yesterday, now resolved. R chest port accessed without issue and positive blood return noted.  Labs obtained on 6/3/19. Visit Vitals  /86 (BP 1 Location: Left arm, BP Patient Position: Sitting)   Pulse (!) 59   Temp 98.9 °F (37.2 °C)   Resp 18   Ht 6' (1.829 m)   Wt 112.5 kg (248 lb 1.6 oz)   SpO2 99%   BMI 33.65 kg/m²       The following medications administered:  NS 1 L with KCL 10 mEq + Magnesium 2 grams IV over 1 hour  NS @ KVO  Zofran 8 mg IVP  Decadron 12 mg IV over 10 minutes  Cisplatin 48 mg IV over 1 hour  Etoposide 240 mg IV over 1 hour    Visit Vitals  /85 (BP 1 Location: Left arm, BP Patient Position: Sitting)   Pulse (!) 51   Temp 98.9 °F (37.2 °C)   Resp 18   Ht 6' (1.829 m)   Wt 112.5 kg (248 lb 1.6 oz)   SpO2 99%   BMI 33.65 kg/m²       Pt tolerated treatment well.  No adverse reaction noted. Port flushed per policy and needle removed, 2x2 and paper tape placed.  Pt discharged ambulatory in no acute distress at 1435, accompanied by self. Next appointment 6/5/19 @ 1100.

## 2019-06-05 ENCOUNTER — OFFICE VISIT (OUTPATIENT)
Dept: ONCOLOGY | Age: 34
End: 2019-06-05

## 2019-06-05 ENCOUNTER — HOSPITAL ENCOUNTER (OUTPATIENT)
Dept: INFUSION THERAPY | Age: 34
Discharge: HOME | End: 2019-06-05
Payer: COMMERCIAL

## 2019-06-05 VITALS
TEMPERATURE: 98 F | RESPIRATION RATE: 18 BRPM | DIASTOLIC BLOOD PRESSURE: 73 MMHG | WEIGHT: 250 LBS | SYSTOLIC BLOOD PRESSURE: 124 MMHG | BODY MASS INDEX: 33.86 KG/M2 | HEIGHT: 72 IN | HEART RATE: 94 BPM | OXYGEN SATURATION: 98 %

## 2019-06-05 VITALS
RESPIRATION RATE: 18 BRPM | HEART RATE: 52 BPM | HEIGHT: 72 IN | DIASTOLIC BLOOD PRESSURE: 79 MMHG | BODY MASS INDEX: 34 KG/M2 | WEIGHT: 251 LBS | SYSTOLIC BLOOD PRESSURE: 128 MMHG | OXYGEN SATURATION: 98 % | TEMPERATURE: 99 F

## 2019-06-05 DIAGNOSIS — C62.91 SEMINOMA OF RIGHT TESTIS, STAGE 2 (HCC): Primary | ICD-10-CM

## 2019-06-05 DIAGNOSIS — C62.10 MALIGNANT NEOPLASM OF DESCENDED TESTIS, UNSPECIFIED LATERALITY (HCC): Primary | ICD-10-CM

## 2019-06-05 PROCEDURE — 96417 CHEMO IV INFUS EACH ADDL SEQ: CPT

## 2019-06-05 PROCEDURE — 74011000258 HC RX REV CODE- 258: Performed by: INTERNAL MEDICINE

## 2019-06-05 PROCEDURE — 74011250636 HC RX REV CODE- 250/636: Performed by: INTERNAL MEDICINE

## 2019-06-05 PROCEDURE — 77030012965 HC NDL HUBR BBMI -A

## 2019-06-05 PROCEDURE — 96375 TX/PRO/DX INJ NEW DRUG ADDON: CPT

## 2019-06-05 PROCEDURE — 96367 TX/PROPH/DG ADDL SEQ IV INF: CPT

## 2019-06-05 PROCEDURE — 96413 CHEMO IV INFUSION 1 HR: CPT

## 2019-06-05 RX ORDER — HEPARIN 100 UNIT/ML
300-500 SYRINGE INTRAVENOUS AS NEEDED
Status: ACTIVE | OUTPATIENT
Start: 2019-06-05 | End: 2019-06-05

## 2019-06-05 RX ORDER — SODIUM CHLORIDE 9 MG/ML
25 INJECTION, SOLUTION INTRAVENOUS CONTINUOUS
Status: DISPENSED | OUTPATIENT
Start: 2019-06-05 | End: 2019-06-05

## 2019-06-05 RX ORDER — SODIUM CHLORIDE 0.9 % (FLUSH) 0.9 %
10 SYRINGE (ML) INJECTION AS NEEDED
Status: ACTIVE | OUTPATIENT
Start: 2019-06-05 | End: 2019-06-05

## 2019-06-05 RX ORDER — SODIUM CHLORIDE 9 MG/ML
10 INJECTION INTRAMUSCULAR; INTRAVENOUS; SUBCUTANEOUS AS NEEDED
Status: ACTIVE | OUTPATIENT
Start: 2019-06-05 | End: 2019-06-05

## 2019-06-05 RX ORDER — ONDANSETRON 2 MG/ML
8 INJECTION INTRAMUSCULAR; INTRAVENOUS ONCE
Status: COMPLETED | OUTPATIENT
Start: 2019-06-05 | End: 2019-06-05

## 2019-06-05 RX ADMIN — Medication 500 UNITS: at 15:23

## 2019-06-05 RX ADMIN — DEXAMETHASONE SODIUM PHOSPHATE 12 MG: 4 INJECTION, SOLUTION INTRA-ARTICULAR; INTRALESIONAL; INTRAMUSCULAR; INTRAVENOUS; SOFT TISSUE at 12:33

## 2019-06-05 RX ADMIN — ONDANSETRON 8 MG: 2 INJECTION, SOLUTION INTRAMUSCULAR; INTRAVENOUS at 12:23

## 2019-06-05 RX ADMIN — SODIUM CHLORIDE 25 ML/HR: 900 INJECTION, SOLUTION INTRAVENOUS at 12:21

## 2019-06-05 RX ADMIN — CISPLATIN 48 MG: 1 INJECTION, SOLUTION INTRAVENOUS at 12:50

## 2019-06-05 RX ADMIN — SODIUM CHLORIDE 10 ML: 9 INJECTION INTRAMUSCULAR; INTRAVENOUS; SUBCUTANEOUS at 15:23

## 2019-06-05 RX ADMIN — POTASSIUM CHLORIDE: 2 INJECTION, SOLUTION, CONCENTRATE INTRAVENOUS at 11:16

## 2019-06-05 RX ADMIN — ETOPOSIDE 240 MG: 20 INJECTION INTRAVENOUS at 14:01

## 2019-06-05 RX ADMIN — Medication 10 ML: at 12:22

## 2019-06-05 NOTE — PROGRESS NOTES
8000 [facility] Note:    6086 Pt arrived at [facility] ambulatory and in no distress for C2D3 BEP. Assessment completed. Pt continued with nausea despite oral meds. Pharmacist notified, will evaluate medication regimen. Port accessed. Blood return positive    Medications received:  NS 1000 ml with potassium chloride 10 meq and mag sulfate 2 grams IV over 1 hour  NS @ KVO  zofran 8 mg IV  Dexamethasone 12 mg IV  Cisplatin 48 mg IV over 1 hour  Etoposide 240 mg IV over 1 hour    Port flushed with NS and heparin and needle removed. Patient Vitals for the past 8 hrs:   Temp Pulse Resp BP SpO2   06/05/19 1520 -- (!) 52 18 128/79 --   06/05/19 1117 99 °F (37.2 °C) (!) 48 18 126/81 98 %       1520 Tolerated treatment well, no adverse reaction noted. D/Cd from [facility] ambulatory and in no distress accompanied by girlfriend.   Next appt tomorrow

## 2019-06-05 NOTE — PROGRESS NOTES
Identified pt with two pt identifiers(name and ). Reviewed record in preparation for visit and have obtained necessary documentation. Chief Complaint   Patient presents with    Testicular Cancer     OPBEP D3 C2      Visit Vitals  /73 (BP 1 Location: Left arm, BP Patient Position: Sitting)   Pulse 94   Temp 98 °F (36.7 °C) (Oral)   Resp 18   Ht 6' (1.829 m)   Wt 250 lb (113.4 kg)   SpO2 98%   BMI 33.91 kg/m²       Health Maintenance Due   Topic    Pneumococcal 0-64 years (1 of 3 - PCV13)    DTaP/Tdap/Td series (1 - Tdap)       Coordination of Care Questionnaire:  :   1) Have you been to an emergency room, urgent care, or hospitalized since your last visit? If yes, where when, and reason for visit? no       2. Have seen or consulted any other health care provider since your last visit? If yes, where when, and reason for visit? NO      3) Do you have an Advanced Directive/ Living Will in place? NO  If yes, do we have a copy on file NO  If no, would you like information NO    Patient is accompanied by self I have received verbal consent from Lázaro Snow to discuss any/all medical information while they are present in the room.

## 2019-06-06 ENCOUNTER — HOSPITAL ENCOUNTER (OUTPATIENT)
Dept: INFUSION THERAPY | Age: 34
Discharge: HOME | End: 2019-06-06
Payer: COMMERCIAL

## 2019-06-06 VITALS
RESPIRATION RATE: 18 BRPM | HEIGHT: 72 IN | HEART RATE: 54 BPM | WEIGHT: 252.8 LBS | SYSTOLIC BLOOD PRESSURE: 146 MMHG | BODY MASS INDEX: 34.24 KG/M2 | DIASTOLIC BLOOD PRESSURE: 83 MMHG | TEMPERATURE: 98.3 F

## 2019-06-06 DIAGNOSIS — C62.10 MALIGNANT NEOPLASM OF DESCENDED TESTIS, UNSPECIFIED LATERALITY (HCC): Primary | ICD-10-CM

## 2019-06-06 PROCEDURE — 96413 CHEMO IV INFUSION 1 HR: CPT

## 2019-06-06 PROCEDURE — 96417 CHEMO IV INFUS EACH ADDL SEQ: CPT

## 2019-06-06 PROCEDURE — 77030012965 HC NDL HUBR BBMI -A

## 2019-06-06 PROCEDURE — 96375 TX/PRO/DX INJ NEW DRUG ADDON: CPT

## 2019-06-06 PROCEDURE — 74011250636 HC RX REV CODE- 250/636: Performed by: INTERNAL MEDICINE

## 2019-06-06 PROCEDURE — 96367 TX/PROPH/DG ADDL SEQ IV INF: CPT

## 2019-06-06 PROCEDURE — 74011000258 HC RX REV CODE- 258: Performed by: INTERNAL MEDICINE

## 2019-06-06 RX ORDER — SODIUM CHLORIDE 0.9 % (FLUSH) 0.9 %
10 SYRINGE (ML) INJECTION AS NEEDED
Status: DISCONTINUED | OUTPATIENT
Start: 2019-06-06 | End: 2019-06-07 | Stop reason: HOSPADM

## 2019-06-06 RX ORDER — SODIUM CHLORIDE 9 MG/ML
25 INJECTION, SOLUTION INTRAVENOUS CONTINUOUS
Status: DISPENSED | OUTPATIENT
Start: 2019-06-06 | End: 2019-06-06

## 2019-06-06 RX ORDER — HEPARIN 100 UNIT/ML
300-500 SYRINGE INTRAVENOUS AS NEEDED
Status: DISCONTINUED | OUTPATIENT
Start: 2019-06-06 | End: 2019-06-07 | Stop reason: HOSPADM

## 2019-06-06 RX ORDER — SODIUM CHLORIDE 9 MG/ML
10 INJECTION INTRAMUSCULAR; INTRAVENOUS; SUBCUTANEOUS AS NEEDED
Status: DISCONTINUED | OUTPATIENT
Start: 2019-06-06 | End: 2019-06-07 | Stop reason: HOSPADM

## 2019-06-06 RX ORDER — ONDANSETRON 2 MG/ML
8 INJECTION INTRAMUSCULAR; INTRAVENOUS ONCE
Status: COMPLETED | OUTPATIENT
Start: 2019-06-06 | End: 2019-06-06

## 2019-06-06 RX ADMIN — SODIUM CHLORIDE 25 ML/HR: 900 INJECTION, SOLUTION INTRAVENOUS at 11:01

## 2019-06-06 RX ADMIN — DEXAMETHASONE SODIUM PHOSPHATE 12 MG: 4 INJECTION, SOLUTION INTRA-ARTICULAR; INTRALESIONAL; INTRAMUSCULAR; INTRAVENOUS; SOFT TISSUE at 11:00

## 2019-06-06 RX ADMIN — POTASSIUM CHLORIDE: 2 INJECTION, SOLUTION, CONCENTRATE INTRAVENOUS at 11:42

## 2019-06-06 RX ADMIN — ONDANSETRON 8 MG: 2 INJECTION, SOLUTION INTRAMUSCULAR; INTRAVENOUS at 11:00

## 2019-06-06 RX ADMIN — ETOPOSIDE 240 MG: 20 INJECTION INTRAVENOUS at 14:07

## 2019-06-06 RX ADMIN — Medication 500 UNITS: at 15:13

## 2019-06-06 RX ADMIN — CISPLATIN 48 MG: 1 INJECTION, SOLUTION INTRAVENOUS at 13:01

## 2019-06-06 RX ADMIN — Medication 10 ML: at 15:13

## 2019-06-06 NOTE — PROGRESS NOTES
1050 Pt arrived at [facility] ambulatory and in no acute distress for the following regimen:    Chemotherapy Flowsheet 6/6/2019   Cycle C2D4   Date 6/6/2019   Drug / Regimen BEP   Pre Hydration given   Pre Meds given   Notes given         Patient Vitals for the past 12 hrs:   Temp Pulse Resp BP   06/06/19 1513 -- (!) 54 -- 146/83   06/06/19 1050 98.3 °F (36.8 °C) (!) 56 18 (!) 138/93       Medications Administered     0.9% sodium chloride 1,000 mL with potassium chloride 10 mEq, magnesium sulfate 2 g infusion     Admin Date  06/06/2019 Action  Given Dose   Rate  1,000 mL/hr Route  IntraVENous Administered By  Ra Membreno RN          0.9% sodium chloride infusion     Admin Date  06/06/2019 Action  New Bag Dose  25 mL/hr Rate  25 mL/hr Route  IntraVENous Administered By  Ra Membreno RN          CISplatin (PLATINOL) 48 mg in 0.9% sodium chloride 250 mL, overfill volume 25 mL chemo infusion     Admin Date  06/06/2019 Action  New Bag Dose  48 mg Rate  323 mL/hr Route  IntraVENous Administered By  Ra Membreno RN          dexamethasone (DECADRON) 12 mg in 0.9% sodium chloride 50 mL IVPB     Admin Date  06/06/2019 Action  Given Dose  12 mg Route  IntraVENous Administered By  Ra Membreno RN          etoposide (VEPESID) 240 mg in 0.9% sodium chloride 1,000 mL, overfill volume 50 mL chemo infusion     Admin Date  06/06/2019 Action  New Bag Dose  240 mg Rate  1,062 mL/hr Route  IntraVENous Administered By  Cleve Cam RN          heparin (porcine) pf 300-500 Units     Admin Date  06/06/2019 Action  Given Dose  500 Units Route  InterCATHeter Administered By  Ra Membreno RN          [facility]ron [facility] injection 8 mg     Admin Date  06/06/2019 Action  Given Dose  8 mg Route  IntraVENous Administered By  Ra Membreno RN          saline peripheral flush soln 10 mL     Admin Date  06/06/2019 Action  Given Dose  10 mL Route  InterCATHeter Administered By  Ra Membreno RN                7800 Tolerated treatment well, no adverse reaction noted. D/Cd from [facility] ambulatory and in no acute distress.     Future Appointments   Date Time Provider Vicki Ruth   6/7/2019 11:00 AM [facility] INFUSION NURSE 4 [facility]   6/10/2019  9:00 [facility]   6/17/2019 11:00 AM [facility] INFUSION NURSE 4 [facility]   6/17/2019 11:15 AM [facility]   6/24/2019 10:00 AM [facility] INFUSION NURSE 4 [facility]   6/25/2019 11:00 AM [facility] INFUSION NURSE 4 [facility]   6/26/2019 11:00 AM [facility] INFUSION NURSE 4 [facility]   6/27/2019 10:00 AM [facility] INFUSION NURSE 4 [facility]   6/28/2019 11:00 AM [facility] INFUSION NURSE 4 [facility]   7/1/2019 11:00 AM [facility] INFUSION NURSE 4 [facility]   7/8/2019 11:00 AM [facility] INFUSION NURSE 4 [facility]

## 2019-06-07 ENCOUNTER — HOSPITAL ENCOUNTER (OUTPATIENT)
Dept: INFUSION THERAPY | Age: 34
Discharge: HOME | End: 2019-06-07
Payer: COMMERCIAL

## 2019-06-07 VITALS
OXYGEN SATURATION: 95 % | HEART RATE: 50 BPM | RESPIRATION RATE: 18 BRPM | WEIGHT: 249.2 LBS | TEMPERATURE: 98.2 F | DIASTOLIC BLOOD PRESSURE: 93 MMHG | SYSTOLIC BLOOD PRESSURE: 153 MMHG | BODY MASS INDEX: 33.8 KG/M2

## 2019-06-07 DIAGNOSIS — C62.10 MALIGNANT NEOPLASM OF DESCENDED TESTIS, UNSPECIFIED LATERALITY (HCC): Primary | ICD-10-CM

## 2019-06-07 PROCEDURE — 74011250636 HC RX REV CODE- 250/636: Performed by: NURSE PRACTITIONER

## 2019-06-07 PROCEDURE — 74011000258 HC RX REV CODE- 258: Performed by: INTERNAL MEDICINE

## 2019-06-07 PROCEDURE — 96417 CHEMO IV INFUS EACH ADDL SEQ: CPT

## 2019-06-07 PROCEDURE — 77030012965 HC NDL HUBR BBMI -A

## 2019-06-07 PROCEDURE — 74011250636 HC RX REV CODE- 250/636: Performed by: INTERNAL MEDICINE

## 2019-06-07 PROCEDURE — 96367 TX/PROPH/DG ADDL SEQ IV INF: CPT

## 2019-06-07 PROCEDURE — 96375 TX/PRO/DX INJ NEW DRUG ADDON: CPT

## 2019-06-07 PROCEDURE — 96413 CHEMO IV INFUSION 1 HR: CPT

## 2019-06-07 RX ORDER — HEPARIN 100 UNIT/ML
300-500 SYRINGE INTRAVENOUS AS NEEDED
Status: DISCONTINUED | OUTPATIENT
Start: 2019-06-07 | End: 2019-06-08 | Stop reason: HOSPADM

## 2019-06-07 RX ORDER — PALONOSETRON 0.05 MG/ML
0.25 INJECTION, SOLUTION INTRAVENOUS ONCE
Status: COMPLETED | OUTPATIENT
Start: 2019-06-07 | End: 2019-06-07

## 2019-06-07 RX ORDER — SODIUM CHLORIDE 9 MG/ML
25 INJECTION, SOLUTION INTRAVENOUS CONTINUOUS
Status: DISPENSED | OUTPATIENT
Start: 2019-06-07 | End: 2019-06-07

## 2019-06-07 RX ORDER — SODIUM CHLORIDE 9 MG/ML
10 INJECTION INTRAMUSCULAR; INTRAVENOUS; SUBCUTANEOUS AS NEEDED
Status: DISCONTINUED | OUTPATIENT
Start: 2019-06-07 | End: 2019-06-08 | Stop reason: HOSPADM

## 2019-06-07 RX ORDER — SODIUM CHLORIDE 0.9 % (FLUSH) 0.9 %
10 SYRINGE (ML) INJECTION AS NEEDED
Status: DISCONTINUED | OUTPATIENT
Start: 2019-06-07 | End: 2019-06-08 | Stop reason: HOSPADM

## 2019-06-07 RX ADMIN — DEXAMETHASONE SODIUM PHOSPHATE 12 MG: 4 INJECTION, SOLUTION INTRA-ARTICULAR; INTRALESIONAL; INTRAMUSCULAR; INTRAVENOUS; SOFT TISSUE at 13:02

## 2019-06-07 RX ADMIN — POTASSIUM CHLORIDE: 2 INJECTION, SOLUTION, CONCENTRATE INTRAVENOUS at 11:52

## 2019-06-07 RX ADMIN — SODIUM CHLORIDE 25 ML/HR: 900 INJECTION, SOLUTION INTRAVENOUS at 11:09

## 2019-06-07 RX ADMIN — CISPLATIN 48 MG: 1 INJECTION, SOLUTION INTRAVENOUS at 13:27

## 2019-06-07 RX ADMIN — Medication 500 UNITS: at 15:49

## 2019-06-07 RX ADMIN — ETOPOSIDE 240 MG: 20 INJECTION INTRAVENOUS at 14:33

## 2019-06-07 RX ADMIN — PALONOSETRON HYDROCHLORIDE 0.25 MG: 0.25 INJECTION, SOLUTION INTRAVENOUS at 11:09

## 2019-06-07 RX ADMIN — Medication 10 ML: at 15:49

## 2019-06-07 NOTE — PROGRESS NOTES
1050 Pt arrived at [facility] ambulatory for C2D5. Assessment completed. Pt complains of severe nausea. Currently dry heaving. Port accessed per protocol with positive blood return. Patient Vitals for the past 12 hrs:   Temp Pulse Resp BP SpO2   06/07/19 1548 -- (!) 50 -- (!) 153/93 --   06/07/19 1052 98.2 °F (36.8 °C) 71 18 (!) 146/102 95 %       Medications received:  Aloxi  Decadron  Cisplatin  Etoposide    Pt slept through most of treatment. [place] through Etoposide c/o nausea. QueaseEase soothing scents offered. Pt declined. 1600 Tolerated treatment well, no adverse reaction noted. Port flushed and de-accessed. D/Cd from [facility] ambulatory and in no distress accompanied by friend.   Next appt 6/10

## 2019-06-10 ENCOUNTER — DOCUMENTATION ONLY (OUTPATIENT)
Dept: ONCOLOGY | Age: 34
End: 2019-06-10

## 2019-06-10 ENCOUNTER — APPOINTMENT (OUTPATIENT)
Dept: INFUSION THERAPY | Age: 34
End: 2019-06-10
Payer: COMMERCIAL

## 2019-06-10 ENCOUNTER — OFFICE VISIT (OUTPATIENT)
Dept: ONCOLOGY | Age: 34
End: 2019-06-10

## 2019-06-10 ENCOUNTER — HOSPITAL ENCOUNTER (OUTPATIENT)
Dept: INFUSION THERAPY | Age: 34
Discharge: HOME | End: 2019-06-10
Payer: COMMERCIAL

## 2019-06-10 VITALS
HEART RATE: 75 BPM | TEMPERATURE: 98.4 F | BODY MASS INDEX: 35.82 KG/M2 | HEIGHT: 68 IN | RESPIRATION RATE: 18 BRPM | WEIGHT: 236.38 LBS | DIASTOLIC BLOOD PRESSURE: 76 MMHG | SYSTOLIC BLOOD PRESSURE: 119 MMHG | OXYGEN SATURATION: 97 %

## 2019-06-10 VITALS
HEIGHT: 68 IN | RESPIRATION RATE: 18 BRPM | DIASTOLIC BLOOD PRESSURE: 69 MMHG | OXYGEN SATURATION: 93 % | BODY MASS INDEX: 35.77 KG/M2 | HEART RATE: 111 BPM | TEMPERATURE: 98.6 F | WEIGHT: 236 LBS | SYSTOLIC BLOOD PRESSURE: 105 MMHG

## 2019-06-10 DIAGNOSIS — T45.1X5A CHEMOTHERAPY-INDUCED NAUSEA: ICD-10-CM

## 2019-06-10 DIAGNOSIS — C62.91 SEMINOMA OF RIGHT TESTIS, STAGE 2 (HCC): Primary | ICD-10-CM

## 2019-06-10 DIAGNOSIS — C62.10 MALIGNANT NEOPLASM OF DESCENDED TESTIS, UNSPECIFIED LATERALITY (HCC): Primary | ICD-10-CM

## 2019-06-10 DIAGNOSIS — R11.0 CHEMOTHERAPY-INDUCED NAUSEA: ICD-10-CM

## 2019-06-10 LAB
BASO+EOS+MONOS # BLD AUTO: 0.2 K/UL (ref 0.2–1.2)
BASO+EOS+MONOS NFR BLD AUTO: 2 % (ref 3.2–16.9)
DIFFERENTIAL METHOD BLD: ABNORMAL
ERYTHROCYTE [DISTWIDTH] IN BLOOD BY AUTOMATED COUNT: 14.4 % (ref 11.8–15.8)
HCT VFR BLD AUTO: 41 % (ref 36.6–50.3)
HGB BLD-MCNC: 14.7 G/DL (ref 12.1–17)
LYMPHOCYTES # BLD: 1 K/UL (ref 0.8–3.5)
LYMPHOCYTES NFR BLD: 14 % (ref 12–49)
MCH RBC QN AUTO: 31.6 PG (ref 26–34)
MCHC RBC AUTO-ENTMCNC: 35.9 G/DL (ref 30–36.5)
MCV RBC AUTO: 88.2 FL (ref 80–99)
NEUTS SEG # BLD: 6 K/UL (ref 1.8–8)
NEUTS SEG NFR BLD: 84 % (ref 32–75)
PLATELET # BLD AUTO: 195 K/UL (ref 150–400)
RBC # BLD AUTO: 4.65 M/UL (ref 4.1–5.7)
WBC # BLD AUTO: 7.2 K/UL (ref 4.1–11.1)

## 2019-06-10 PROCEDURE — 85025 COMPLETE CBC W/AUTO DIFF WBC: CPT

## 2019-06-10 PROCEDURE — 96409 CHEMO IV PUSH SNGL DRUG: CPT

## 2019-06-10 PROCEDURE — 96375 TX/PRO/DX INJ NEW DRUG ADDON: CPT

## 2019-06-10 PROCEDURE — 74011250636 HC RX REV CODE- 250/636: Performed by: NURSE PRACTITIONER

## 2019-06-10 PROCEDURE — 74011250637 HC RX REV CODE- 250/637: Performed by: INTERNAL MEDICINE

## 2019-06-10 PROCEDURE — 74011250636 HC RX REV CODE- 250/636: Performed by: INTERNAL MEDICINE

## 2019-06-10 PROCEDURE — 96361 HYDRATE IV INFUSION ADD-ON: CPT

## 2019-06-10 PROCEDURE — 74011250637 HC RX REV CODE- 250/637: Performed by: NURSE PRACTITIONER

## 2019-06-10 PROCEDURE — 74011000258 HC RX REV CODE- 258: Performed by: INTERNAL MEDICINE

## 2019-06-10 PROCEDURE — 77030012965 HC NDL HUBR BBMI -A

## 2019-06-10 PROCEDURE — 36415 COLL VENOUS BLD VENIPUNCTURE: CPT

## 2019-06-10 RX ORDER — PALONOSETRON 0.05 MG/ML
0.25 INJECTION, SOLUTION INTRAVENOUS ONCE
Status: COMPLETED | OUTPATIENT
Start: 2019-06-10 | End: 2019-06-10

## 2019-06-10 RX ORDER — SODIUM CHLORIDE 9 MG/ML
25 INJECTION, SOLUTION INTRAVENOUS CONTINUOUS
Status: DISCONTINUED | OUTPATIENT
Start: 2019-06-10 | End: 2019-06-14 | Stop reason: HOSPADM

## 2019-06-10 RX ORDER — ACETAMINOPHEN 325 MG/1
650 TABLET ORAL ONCE
Status: COMPLETED | OUTPATIENT
Start: 2019-06-10 | End: 2019-06-10

## 2019-06-10 RX ORDER — DEXAMETHASONE SODIUM PHOSPHATE 4 MG/ML
10 INJECTION, SOLUTION INTRA-ARTICULAR; INTRALESIONAL; INTRAMUSCULAR; INTRAVENOUS; SOFT TISSUE ONCE
Status: COMPLETED | OUTPATIENT
Start: 2019-06-10 | End: 2019-06-10

## 2019-06-10 RX ORDER — DIPHENHYDRAMINE HYDROCHLORIDE 50 MG/ML
25 INJECTION, SOLUTION INTRAMUSCULAR; INTRAVENOUS ONCE
Status: DISCONTINUED | OUTPATIENT
Start: 2019-06-10 | End: 2019-06-10 | Stop reason: SDUPTHER

## 2019-06-10 RX ORDER — PROMETHAZINE HYDROCHLORIDE 25 MG/1
25 TABLET ORAL
Qty: 30 TAB | Refills: 0 | Status: SHIPPED | OUTPATIENT
Start: 2019-06-10 | End: 2019-06-28 | Stop reason: SDUPTHER

## 2019-06-10 RX ORDER — OLANZAPINE 10 MG/1
10 TABLET ORAL
Qty: 30 TAB | Refills: 0 | Status: SHIPPED | OUTPATIENT
Start: 2019-06-10 | End: 2019-07-06

## 2019-06-10 RX ORDER — SODIUM CHLORIDE 0.9 % (FLUSH) 0.9 %
10 SYRINGE (ML) INJECTION AS NEEDED
Status: ACTIVE | OUTPATIENT
Start: 2019-06-10 | End: 2019-06-10

## 2019-06-10 RX ORDER — DEXAMETHASONE 4 MG/1
8 TABLET ORAL 2 TIMES DAILY WITH MEALS
Qty: 60 TAB | Refills: 1 | Status: SHIPPED | OUTPATIENT
Start: 2019-06-10 | End: 2019-07-06 | Stop reason: CLARIF

## 2019-06-10 RX ORDER — HEPARIN 100 UNIT/ML
300-500 SYRINGE INTRAVENOUS AS NEEDED
Status: ACTIVE | OUTPATIENT
Start: 2019-06-10 | End: 2019-06-10

## 2019-06-10 RX ORDER — SODIUM CHLORIDE 9 MG/ML
10 INJECTION INTRAMUSCULAR; INTRAVENOUS; SUBCUTANEOUS AS NEEDED
Status: ACTIVE | OUTPATIENT
Start: 2019-06-10 | End: 2019-06-10

## 2019-06-10 RX ORDER — DIPHENHYDRAMINE HCL 25 MG
25 CAPSULE ORAL ONCE
Status: COMPLETED | OUTPATIENT
Start: 2019-06-10 | End: 2019-06-10

## 2019-06-10 RX ADMIN — Medication 500 UNITS: at 12:42

## 2019-06-10 RX ADMIN — SODIUM CHLORIDE 1000 ML: 900 INJECTION, SOLUTION INTRAVENOUS at 10:21

## 2019-06-10 RX ADMIN — SODIUM CHLORIDE 25 ML/HR: 900 INJECTION, SOLUTION INTRAVENOUS at 12:02

## 2019-06-10 RX ADMIN — Medication 10 ML: at 12:42

## 2019-06-10 RX ADMIN — DEXAMETHASONE SODIUM PHOSPHATE 10 MG: 4 INJECTION, SOLUTION INTRA-ARTICULAR; INTRALESIONAL; INTRAMUSCULAR; INTRAVENOUS; SOFT TISSUE at 10:29

## 2019-06-10 RX ADMIN — ACETAMINOPHEN 650 MG: 325 TABLET ORAL at 11:47

## 2019-06-10 RX ADMIN — BLEOMYCIN SULFATE 30 UNITS: 30 INJECTION, POWDER, LYOPHILIZED, FOR SOLUTION INTRAMUSCULAR; INTRAPLEURAL; INTRAVENOUS; SUBCUTANEOUS at 12:16

## 2019-06-10 RX ADMIN — PALONOSETRON HYDROCHLORIDE 0.25 MG: 0.25 INJECTION, SOLUTION INTRAVENOUS at 10:22

## 2019-06-10 RX ADMIN — SODIUM CHLORIDE 10 ML: 9 INJECTION INTRAMUSCULAR; INTRAVENOUS; SUBCUTANEOUS at 10:20

## 2019-06-10 RX ADMIN — DIPHENHYDRAMINE HYDROCHLORIDE 25 MG: 25 CAPSULE ORAL at 11:47

## 2019-06-10 RX ADMIN — Medication 10 ML: at 10:21

## 2019-06-10 NOTE — PROGRESS NOTES
Pt arrived to [facility] ambulatory in no acute distress at 0900 for Bleomycin C2D8.  Assessment unremarkable except pt c/o of nausea and vomiting. Pt vomiting on arrival to [facility] today. Ronald Arriaga NP notified that pt is refusing port access at this time and would like to speak with Dr. Stacy Black prior to proceeding. Pt sent to office for an appointment. Pt returned from office and has agreed to treatment. Spoke with Ronald Arriaga NP and additional orders have been entered into [facility] for Aloxi, Decadron and NS 1 Liter Hydration today. Pt will come 6/11/19 for 1 Liter Hydration and then every other day. Pt refuses to go to any site for hydration except [facility] Shannon aware an appointments scheduled. R chest port accessed without issue and positive blood return noted.        @1240 Spoke with Salvatore Dixon from Dr. [facility] office make pt aware his prescription are at the [facility] located in [place] [address] and ready for pickup. Pt verbalized understanding and will pick prescriptions up today after [facility] appointment. Visit Vitals  BP (!) 128/94 (BP 1 Location: Right arm, BP Patient Position: At rest)   Pulse (!) 105   Temp 98.4 °F (36.9 °C)   Resp 18   Ht 5' 8\" (1.727 m)   Wt 107.2 kg (236 lb 6 oz)   SpO2 97%   BMI 35.94 kg/m²       Labs obtained:   Recent Results (from the past 12 hour(s))   CBC WITH 3 PART DIFF    Collection Time: 06/10/19 10:35 AM   Result Value Ref Range    WBC 7.2 4.1 - 11.1 K/uL    RBC 4.65 4.10 - 5.70 M/uL    HGB 14.7 12.1 - 17.0 g/dL    HCT 41.0 36.6 - 50.3 %    MCV 88.2 80.0 - 99.0 FL    MCH 31.6 26.0 - 34.0 PG    MCHC 35.9 30.0 - 36.5 g/dL    RDW 14.4 11.8 - 15.8 %    PLATELET 044 231 - 230 K/uL    NEUTROPHILS 84 (H) 32 - 75 %    MIXED CELLS 2 (L) 3.2 - 16.9 %    LYMPHOCYTES 14 12 - 49 %    ABS. NEUTROPHILS 6.0 1.8 - 8.0 K/UL    ABS. MIXED CELLS 0.2 0.2 - 1.2 K/uL    ABS.  LYMPHOCYTES 1.0 0.8 - 3.5 K/UL    DF AUTOMATED         The following medications administered:  NS 1 Liter Bolus  NS KVO  Aloxi  Decadron  Bleomycin  NS Flush  Heparin Flush    Visit Vitals  /76 (BP 1 Location: Right arm, BP Patient Position: At rest)   Pulse 75   Temp 98.4 °F (36.9 °C)   Resp 18   Ht 5' 8\" (1.727 m)   Wt 107.2 kg (236 lb 6 oz)   SpO2 97%   BMI 35.94 kg/m²       Pt tolerated treatment well. No adverse reactions noted. IV flushed per policy and removed, 2x2 and paper tape placed.  Pt discharged ambulatory in no acute distress at 1250, accompanied by family.   Next appointment 6/11/19 @ 11 am.

## 2019-06-10 NOTE — PROGRESS NOTES
[employer]  Social Work Navigator Encounter     Patient Name:  Vineet Siu    Medical History: dx testicular cancer    Advance Directives:    Narrative: pt nauseous, not in frame of mind to talk. SW spoke with Presley Reyna, pt leandro/who says pt is living with his mother, and she feels like he is pushing her away. Pt not working and Presley Check stated she gets paid on Thursday, she works at [employer], and has all the bills to pay - she thought she could get one prescription on Thursday. SW submitted 3 prescriptions to [facility] which were approved and pt should have picked up meds after his chemo was completed around 1pm.  [facility] Cost was $43.85. Barriers to Care:     Plan:   1.  SW to follow up with pt about behavioral health appt.

## 2019-06-10 NOTE — PROGRESS NOTES
2001 [facility]  [address], [address] [facility] [place], [address] [facility]  [phone]       Oncology progress note        Patient: Pedro Marx MRN: [id number]  SSN: [id number]    YOB: 1985  Age: 29 y.o. Sex: male        Diagnosis:      1. 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 Right sided radical orchiectomy  2. Systemic chemotherapy,   BEP - Cycle 2 Day 8    Subjective:      Pedro Marx is a 29 y.o. male male who noted a swelling in the right testicles for over 6 months. The swelling was associated with a dragging sensation. The patient then was seen by Dr. Leigh Saltre. He underwent a right radical orchiectomy on 03/27/2019. The pathology shows T1c disease. A CT scan was done on 04/03 which reveals metastatic disease. Mr. Ritchie [place] is receiving his second cycle of BEP. He was hospitalized after his first cycle of treatment with neutropenic fever. He has been having intractable nausea and vomiting. Discussed hospital admission but patient refused. Review of Systems:    Constitutional: negative  Eyes: negative  Ears, Nose, Mouth, Throat, and Face: negative  Respiratory: negative  Cardiovascular: negative  Gastrointestinal: nausea/vomiting  Genitourinary:negative  Integument/Breast: negative  Hematologic/Lymphatic: negative  Musculoskeletal:negative  Neurological: negative      Past Medical History:   Diagnosis Date    Anxiety disorder     Depression     Suicidal thoughts     Testicle cancer ([facility] [address]      Past Surgical History:   Procedure Laterality Date    HX ORTHOPAEDIC      lft knee surgery    HX ORTHOPAEDIC      right shoulder/right knee    IR INSERT TUNL CVC W PORT OVER 5 YEARS  4/18/2019      History reviewed. No pertinent family history.   Social History     Tobacco Use    Smoking status: Current Every Day Smoker Packs/day: 0.50    Smokeless tobacco: Former User   Substance Use Topics    Alcohol use: Yes     Comment: per pt \"once a weekend 6 or 7 beers\"      Prior to Admission medications    Medication Sig Start Date End Date Taking? Authorizing Provider   promethazine (PHENERGAN) 25 mg tablet Take 1 Tab by mouth every six (6) hours as needed for Nausea. 6/10/19  Yes Ed Carter MD   OLANZapine (ZYPREXA) 10 mg tablet Take 1 Tab by mouth nightly. Take morning of chemo and once daily for 4 days following chemo 6/10/19  Yes Ed Carter MD   dexamethasone (DECADRON) 4 mg tablet Take 8 mg by mouth two (2) times daily (with meals). 6/10/19  Yes Ed Carter MD   prochlorperazine (COMPAZINE) 10 mg tablet Take 5 mg by mouth every six (6) hours as needed for Nausea. Yes Provider, Lluvia   ondansetron (ZOFRAN ODT) 4 mg disintegrating tablet Take 1 Tab by mouth every eight (8) hours as needed for Nausea. 4/8/19  Yes Janae Sanders NP   lidocaine-prilocaine (EMLA) topical cream Apply  to affected area as needed for Pain. 4/8/19  Yes Miles Sanders NP            No Known Allergies        Objective:     Visit Vitals  /69 (BP 1 Location: Left arm, BP Patient Position: Sitting)   Pulse (!) 111   Temp 98.6 °F (37 °C) (Oral)   Resp 18   Ht 5' 8\" (1.727 m)   Wt 236 lb (107 kg)   SpO2 93%   BMI 35.88 kg/m²       Pain Scale: 0 - No pain/10  Pain Location:       Physical Exam:    GENERAL: alert, cooperative, no distress, appears stated age  EYE: conjunctivae/corneas clear. PERRL, EOM's intact. Fundi benign  LYMPHATIC: Cervical, supraclavicular, and axillary nodes normal.   THROAT & NECK: normal and no erythema or exudates noted. LUNG: clear to auscultation bilaterally  HEART: regular rate and rhythm, S1, S2 normal, no murmur, click, rub or gallop  ABDOMEN: soft, non-tender.  Bowel sounds normal. No masses,  no organomegaly  EXTREMITIES:  extremities normal, atraumatic, no cyanosis or edema  SKIN: extensive tattoo on the torso and arms  NEUROLOGIC: AOx3. Gait normal. Reflexes and motor strength normal and symmetric. Cranial nerves 2-12 and sensation grossly intact. CT Results (most recent):  Results from Hospital Encounter encounter on 05/12/19   CT NECK SOFT TISSUE W CONT    Narrative EXAM: CT SOFT TISSUE NECK with Contrast    INDICATION:  Neutropenic fever. mouth and neck pain r/o abscess      TECHNIQUE: Multislice helical CT was performed from the skull base to the  thoracic inlet with 100 mL Isovue 300 IV. Contiguous 2.5 mm axial images were  reconstructed and coronal and sagittal 2D reformations were generated. CT dose  reduction was achieved through use of a standardized protocol tailored for this  examination and automatic exposure control for dose modulation. FINDINGS:  Keyes tonsils are symmetrically slightly large, with central subcentimeter  mild hypodensity, not conclusive but difficult to exclude developing tonsillar  abscess particularly on the right. There are dental caries without apparent periapical or floor of mouth abscess. No mass or significant adenopathy is identified within the neck. The carotid and jugular vessels are patent. The submandibular and parotid glands are normal. Thyroid is unremarkable. No laryngeal edema or mass is identified. Deep parapharyngeal and  retropharyngeal regions are normal. Subglottic airway is clear. No abnormalities are identified in the skull base. The visualized mastoid air cells and paranasal sinuses are clear. The lung apices are clear. Impression IMPRESSION:   1. Prominent tonsils with questionable developing abscess on the right-correlate  clinically. 2. Dental caries without apparent periapical abscess.          Lab Results   Component Value Date/Time    WBC 7.2 06/10/2019 10:35 AM    HGB 14.7 06/10/2019 10:35 AM    HCT 41.0 06/10/2019 10:35 AM    PLATELET 070 48/03/2984 10:35 AM    MCV 88.2 06/10/2019 10:35 AM       Lab Results   Component Value Date/Time    Sodium 139 06/03/2019 09:03 AM    Potassium 3.7 06/03/2019 09:03 AM    Chloride 109 (H) 06/03/2019 09:03 AM    CO2 25 06/03/2019 09:03 AM    Anion gap 5 06/03/2019 09:03 AM    Glucose 111 (H) 06/03/2019 09:03 AM    BUN 12 06/03/2019 09:03 AM    Creatinine 0.96 06/03/2019 09:03 AM    BUN/Creatinine ratio 13 06/03/2019 09:03 AM    GFR est AA >60 06/03/2019 09:03 AM    GFR est non-AA >60 06/03/2019 09:03 AM    Calcium 8.5 06/03/2019 09:03 AM    Bilirubin, total 0.5 06/03/2019 09:03 AM    AST (SGOT) 14 (L) 06/03/2019 09:03 AM    Alk. phosphatase 80 06/03/2019 09:03 AM    Protein, total 6.6 06/03/2019 09:03 AM    Albumin 3.5 06/03/2019 09:03 AM    Globulin 3.1 06/03/2019 09:03 AM    A-G Ratio 1.1 06/03/2019 09:03 AM    ALT (SGPT) 51 06/03/2019 09:03 AM           Assessment:     1. 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 Blood counts are acceptable. Results reviewed with the patient    Symptom management form reviewed and scanned into the EMR under Media.       2. Nausea/vomiting, CINV    > Aloxi and dexamethasone on treatment days  > Zofran and compazine not effective  > Add phenergan 25 mg q6hr as needed  > Add dexamethasone 8 mg po BID x 5 days following chemotherapy  > Add olanzapine 10 mg po daily starting the day of chemotherapy x 4 days  > IV fluids in OPIC twice weekly         Plan:       · Labs today: CBC, CMP, AFP, Beta hCG, LDH  · Continue with Cycle #2 of BEP  · Prophylactic antiemetics: Ondansetron and Dexamethasone IV days 1-5  · PRN antiemetics: Ondansetron and Prochlorperazine  · Additional antiemetics: phenergan, zyprexa, and dexamethasone  · Fluids in OPIC twice a week  · Return for follow up in 2 weeks        Signed by: Kian Bergman MD                     Nelly 10, 2019        CC. Vineet Olson MD  CC.  Shyam Mccarthy MD

## 2019-06-10 NOTE — PROGRESS NOTES
Identified pt with two pt identifiers(name and ). Reviewed record in preparation for visit and have obtained necessary documentation. Chief Complaint   Patient presents with    Testicular Cancer     D8 C2 BEP    Patient has been vomiting up everything states he can not keep anything down. Visit Vitals  /69 (BP 1 Location: Left arm, BP Patient Position: Sitting)   Pulse (!) 111   Temp 98.6 °F (37 °C) (Oral)   Resp 18   Ht 5' 8\" (1.727 m)   Wt 236 lb (107 kg)   SpO2 93%   BMI 35.88 kg/m²         Health Maintenance Due   Topic    Pneumococcal 0-64 years (1 of 3 - PCV13)    DTaP/Tdap/Td series (1 - Tdap)       Coordination of Care Questionnaire:  :   1) Have you been to an emergency room, urgent care, or hospitalized since your last visit? If yes, where when, and reason for visit? no       2. Have seen or consulted any other health care provider since your last visit? If yes, where when, and reason for visit? NO      3) Do you have an Advanced Directive/ Living Will in place? NO  If yes, do we have a copy on file NO  If no, would you like information NO    Patient is accompanied by wife   I have received verbal consent from [facility] to discuss any/all medical information while they are present in the room.

## 2019-06-11 ENCOUNTER — HOSPITAL ENCOUNTER (OUTPATIENT)
Dept: INFUSION THERAPY | Age: 34
Discharge: HOME | End: 2019-06-11
Payer: COMMERCIAL

## 2019-06-11 ENCOUNTER — APPOINTMENT (OUTPATIENT)
Dept: INFUSION THERAPY | Age: 34
End: 2019-06-11
Payer: COMMERCIAL

## 2019-06-11 VITALS
TEMPERATURE: 97.5 F | DIASTOLIC BLOOD PRESSURE: 86 MMHG | RESPIRATION RATE: 18 BRPM | HEART RATE: 62 BPM | SYSTOLIC BLOOD PRESSURE: 130 MMHG

## 2019-06-11 DIAGNOSIS — C62.11 MALIGNANT NEOPLASM OF DESCENDED RIGHT TESTIS (HCC): Primary | ICD-10-CM

## 2019-06-11 PROCEDURE — 96360 HYDRATION IV INFUSION INIT: CPT

## 2019-06-11 PROCEDURE — 74011250636 HC RX REV CODE- 250/636: Performed by: INTERNAL MEDICINE

## 2019-06-11 PROCEDURE — 77030012965 HC NDL HUBR BBMI -A

## 2019-06-11 PROCEDURE — 74011250636 HC RX REV CODE- 250/636: Performed by: NURSE PRACTITIONER

## 2019-06-11 RX ORDER — SODIUM CHLORIDE 0.9 % (FLUSH) 0.9 %
5-10 SYRINGE (ML) INJECTION AS NEEDED
Status: DISCONTINUED | OUTPATIENT
Start: 2019-06-11 | End: 2019-06-12 | Stop reason: HOSPADM

## 2019-06-11 RX ORDER — HEPARIN 100 UNIT/ML
500 SYRINGE INTRAVENOUS AS NEEDED
Status: DISCONTINUED | OUTPATIENT
Start: 2019-06-11 | End: 2019-06-12 | Stop reason: HOSPADM

## 2019-06-11 RX ADMIN — SODIUM CHLORIDE 1000 ML: 900 INJECTION, SOLUTION INTRAVENOUS at 11:12

## 2019-06-11 RX ADMIN — Medication 500 UNITS: at 12:20

## 2019-06-11 NOTE — PROGRESS NOTES
1100 Pt arrived at [facility] ambulatory and in no distress for IV hydration. Assessment completed. Pt reports nausea is improving. Port accessed per protocol with positive blood return. Patient Vitals for the past 12 hrs:   Temp Pulse Resp BP   06/11/19 1219 -- 62 -- 130/86   06/11/19 1100 97.5 °F (36.4 °C) 66 18 122/80       Medications received:  NS 1 liter IV    1220 Tolerated treatment well, no adverse reaction noted. Port flushed and de-accessed. D/Cd from [facility] ambulatory and in no distress accompanied by self. Next appt 6/13 @ 1300.

## 2019-06-12 ENCOUNTER — APPOINTMENT (OUTPATIENT)
Dept: INFUSION THERAPY | Age: 34
End: 2019-06-12
Payer: COMMERCIAL

## 2019-06-13 ENCOUNTER — APPOINTMENT (OUTPATIENT)
Dept: INFUSION THERAPY | Age: 34
End: 2019-06-13
Payer: COMMERCIAL

## 2019-06-13 ENCOUNTER — HOSPITAL ENCOUNTER (OUTPATIENT)
Dept: INFUSION THERAPY | Age: 34
Discharge: HOME | End: 2019-06-13
Payer: COMMERCIAL

## 2019-06-13 VITALS
HEART RATE: 76 BPM | OXYGEN SATURATION: 99 % | RESPIRATION RATE: 18 BRPM | DIASTOLIC BLOOD PRESSURE: 83 MMHG | SYSTOLIC BLOOD PRESSURE: 128 MMHG | TEMPERATURE: 98.8 F

## 2019-06-13 DIAGNOSIS — C62.11 MALIGNANT NEOPLASM OF DESCENDED RIGHT TESTIS (HCC): Primary | ICD-10-CM

## 2019-06-13 PROCEDURE — 77030012965 HC NDL HUBR BBMI -A

## 2019-06-13 PROCEDURE — 74011250636 HC RX REV CODE- 250/636: Performed by: NURSE PRACTITIONER

## 2019-06-13 PROCEDURE — 74011250636 HC RX REV CODE- 250/636: Performed by: INTERNAL MEDICINE

## 2019-06-13 PROCEDURE — 96360 HYDRATION IV INFUSION INIT: CPT

## 2019-06-13 RX ORDER — HEPARIN 100 UNIT/ML
500 SYRINGE INTRAVENOUS AS NEEDED
Status: DISCONTINUED | OUTPATIENT
Start: 2019-06-13 | End: 2019-06-14 | Stop reason: HOSPADM

## 2019-06-13 RX ORDER — SODIUM CHLORIDE 0.9 % (FLUSH) 0.9 %
5-10 SYRINGE (ML) INJECTION AS NEEDED
Status: DISCONTINUED | OUTPATIENT
Start: 2019-06-13 | End: 2019-06-14 | Stop reason: HOSPADM

## 2019-06-13 RX ADMIN — SODIUM CHLORIDE 1000 ML: 900 INJECTION, SOLUTION INTRAVENOUS at 13:15

## 2019-06-13 RX ADMIN — Medication 10 ML: at 14:25

## 2019-06-13 RX ADMIN — Medication 500 UNITS: at 14:25

## 2019-06-13 NOTE — PROGRESS NOTES
Pt arrived at [facility] ambulatory and in no distress for IV hydration. Assessment completed, no new complaints voiced. Pt continues to feel slightly better each day. Pt excited about upcoming concert / benefit that a friend of his organizing for July 13. Port accessed per protocol with positive blood return. Patient Vitals for the past 12 hrs:   Temp Pulse Resp BP SpO2   06/13/19 1425 -- 76 -- 128/83 --   06/13/19 1304 98.8 °F (37.1 °C) 81 18 119/78 99 %       Medications received:  1 liter NS    1425 Tolerated treatment well, no adverse reaction noted. Port flushed and de-accessed. D/Cd from [facility] ambulatory and in no distress accompanied by self. Next appt 6/17.

## 2019-06-14 ENCOUNTER — APPOINTMENT (OUTPATIENT)
Dept: INFUSION THERAPY | Age: 34
End: 2019-06-14
Payer: COMMERCIAL

## 2019-06-17 ENCOUNTER — DOCUMENTATION ONLY (OUTPATIENT)
Dept: ONCOLOGY | Age: 34
End: 2019-06-17

## 2019-06-17 ENCOUNTER — OFFICE VISIT (OUTPATIENT)
Dept: ONCOLOGY | Age: 34
End: 2019-06-17

## 2019-06-17 ENCOUNTER — HOSPITAL ENCOUNTER (OUTPATIENT)
Dept: INFUSION THERAPY | Age: 34
Discharge: HOME | End: 2019-06-17
Payer: COMMERCIAL

## 2019-06-17 VITALS
OXYGEN SATURATION: 97 % | TEMPERATURE: 99 F | RESPIRATION RATE: 18 BRPM | HEIGHT: 68 IN | BODY MASS INDEX: 34.48 KG/M2 | DIASTOLIC BLOOD PRESSURE: 85 MMHG | HEART RATE: 69 BPM | WEIGHT: 227.5 LBS | SYSTOLIC BLOOD PRESSURE: 133 MMHG

## 2019-06-17 VITALS
HEART RATE: 95 BPM | OXYGEN SATURATION: 95 % | HEIGHT: 68 IN | SYSTOLIC BLOOD PRESSURE: 111 MMHG | BODY MASS INDEX: 34.4 KG/M2 | WEIGHT: 227 LBS | DIASTOLIC BLOOD PRESSURE: 75 MMHG | RESPIRATION RATE: 18 BRPM | TEMPERATURE: 98.6 F

## 2019-06-17 DIAGNOSIS — R11.2 CINV (CHEMOTHERAPY-INDUCED NAUSEA AND VOMITING): ICD-10-CM

## 2019-06-17 DIAGNOSIS — C62.91 SEMINOMA OF RIGHT TESTIS, STAGE 2 (HCC): Primary | ICD-10-CM

## 2019-06-17 DIAGNOSIS — C62.10 MALIGNANT NEOPLASM OF DESCENDED TESTIS, UNSPECIFIED LATERALITY (HCC): Primary | ICD-10-CM

## 2019-06-17 DIAGNOSIS — T45.1X5A CINV (CHEMOTHERAPY-INDUCED NAUSEA AND VOMITING): ICD-10-CM

## 2019-06-17 LAB
BASOPHILS # BLD: 0 K/UL (ref 0–0.1)
BASOPHILS NFR BLD: 0 % (ref 0–1)
DIFFERENTIAL METHOD BLD: ABNORMAL
EOSINOPHIL # BLD: 0 K/UL (ref 0–0.4)
EOSINOPHIL NFR BLD: 0 % (ref 0–7)
ERYTHROCYTE [DISTWIDTH] IN BLOOD BY AUTOMATED COUNT: 13.2 % (ref 11.5–14.5)
HCT VFR BLD AUTO: 34.4 % (ref 36.6–50.3)
HGB BLD-MCNC: 12.6 G/DL (ref 12.1–17)
IMM GRANULOCYTES # BLD AUTO: 0 K/UL (ref 0–0.04)
IMM GRANULOCYTES NFR BLD AUTO: 0 % (ref 0–0.5)
LYMPHOCYTES # BLD: 0.4 K/UL (ref 0.8–3.5)
LYMPHOCYTES NFR BLD: 36 % (ref 12–49)
MCH RBC QN AUTO: 31.7 PG (ref 26–34)
MCHC RBC AUTO-ENTMCNC: 36.6 G/DL (ref 30–36.5)
MCV RBC AUTO: 86.4 FL (ref 80–99)
MONOCYTES # BLD: 0.3 K/UL (ref 0–1)
MONOCYTES NFR BLD: 24 % (ref 5–13)
NEUTS SEG # BLD: 0.4 K/UL (ref 1.8–8)
NEUTS SEG NFR BLD: 40 % (ref 32–75)
NRBC # BLD: 0 K/UL (ref 0–0.01)
NRBC BLD-RTO: 0 PER 100 WBC
PLATELET # BLD AUTO: 127 K/UL (ref 150–400)
PMV BLD AUTO: 10.1 FL (ref 8.9–12.9)
RBC # BLD AUTO: 3.98 M/UL (ref 4.1–5.7)
RBC MORPH BLD: ABNORMAL
RBC MORPH BLD: ABNORMAL
WBC # BLD AUTO: 1.1 K/UL (ref 4.1–11.1)
WBC MORPH BLD: ABNORMAL

## 2019-06-17 PROCEDURE — 74011250637 HC RX REV CODE- 250/637: Performed by: NURSE PRACTITIONER

## 2019-06-17 PROCEDURE — 74011250636 HC RX REV CODE- 250/636: Performed by: INTERNAL MEDICINE

## 2019-06-17 PROCEDURE — 96361 HYDRATE IV INFUSION ADD-ON: CPT

## 2019-06-17 PROCEDURE — 85025 COMPLETE CBC W/AUTO DIFF WBC: CPT

## 2019-06-17 PROCEDURE — 74011250637 HC RX REV CODE- 250/637: Performed by: INTERNAL MEDICINE

## 2019-06-17 PROCEDURE — 74011000258 HC RX REV CODE- 258: Performed by: INTERNAL MEDICINE

## 2019-06-17 PROCEDURE — 96409 CHEMO IV PUSH SNGL DRUG: CPT

## 2019-06-17 PROCEDURE — 74011000250 HC RX REV CODE- 250: Performed by: INTERNAL MEDICINE

## 2019-06-17 PROCEDURE — 36415 COLL VENOUS BLD VENIPUNCTURE: CPT

## 2019-06-17 PROCEDURE — 77030012965 HC NDL HUBR BBMI -A

## 2019-06-17 RX ORDER — SODIUM CHLORIDE 9 MG/ML
10 INJECTION INTRAMUSCULAR; INTRAVENOUS; SUBCUTANEOUS AS NEEDED
Status: ACTIVE | OUTPATIENT
Start: 2019-06-17 | End: 2019-06-17

## 2019-06-17 RX ORDER — HEPARIN 100 UNIT/ML
300-500 SYRINGE INTRAVENOUS AS NEEDED
Status: ACTIVE | OUTPATIENT
Start: 2019-06-17 | End: 2019-06-17

## 2019-06-17 RX ORDER — DIPHENHYDRAMINE HYDROCHLORIDE 50 MG/ML
25 INJECTION, SOLUTION INTRAMUSCULAR; INTRAVENOUS ONCE
Status: DISCONTINUED | OUTPATIENT
Start: 2019-06-17 | End: 2019-06-17 | Stop reason: CLARIF

## 2019-06-17 RX ORDER — DIPHENHYDRAMINE HCL 25 MG
25 CAPSULE ORAL ONCE
Status: COMPLETED | OUTPATIENT
Start: 2019-06-17 | End: 2019-06-17

## 2019-06-17 RX ORDER — ACETAMINOPHEN 325 MG/1
650 TABLET ORAL ONCE
Status: COMPLETED | OUTPATIENT
Start: 2019-06-17 | End: 2019-06-17

## 2019-06-17 RX ORDER — SODIUM CHLORIDE 9 MG/ML
25 INJECTION, SOLUTION INTRAVENOUS CONTINUOUS
Status: DISCONTINUED | OUTPATIENT
Start: 2019-06-17 | End: 2019-06-21 | Stop reason: HOSPADM

## 2019-06-17 RX ORDER — SODIUM CHLORIDE 0.9 % (FLUSH) 0.9 %
10 SYRINGE (ML) INJECTION AS NEEDED
Status: ACTIVE | OUTPATIENT
Start: 2019-06-17 | End: 2019-06-17

## 2019-06-17 RX ADMIN — Medication 10 ML: at 11:20

## 2019-06-17 RX ADMIN — SODIUM CHLORIDE 1000 ML: 900 INJECTION, SOLUTION INTRAVENOUS at 12:45

## 2019-06-17 RX ADMIN — ACETAMINOPHEN 650 MG: 325 TABLET ORAL at 12:46

## 2019-06-17 RX ADMIN — BLEOMYCIN SULFATE 30 UNITS: 30 INJECTION, POWDER, LYOPHILIZED, FOR SOLUTION INTRAMUSCULAR; INTRAPLEURAL; INTRAVENOUS; SUBCUTANEOUS at 13:41

## 2019-06-17 RX ADMIN — Medication 10 ML: at 14:05

## 2019-06-17 RX ADMIN — DIPHENHYDRAMINE HYDROCHLORIDE 25 MG: 25 CAPSULE ORAL at 12:46

## 2019-06-17 RX ADMIN — Medication 500 UNITS: at 14:05

## 2019-06-17 RX ADMIN — SODIUM CHLORIDE 10 ML: 9 INJECTION INTRAMUSCULAR; INTRAVENOUS; SUBCUTANEOUS at 11:20

## 2019-06-17 NOTE — PROGRESS NOTES
[address] [facility], [address] [facility]   W: [phone]  F: [phone]    Medical Nutrition Therapy      Reason for nutrition visit: nausea/vomiting  Supportive visit with patient to introduce self and discussed role of oncology dietitian in providing supportive nutrition care. Explained that RD is available to be a resource for managing symptoms, minimizing weight changes and maintaining optimal nutrition status during and after cancer treatment. He reports unable to tolerate any except for water. Discussed strategies to helping him manage nausea/vomiting in addition to the antiemetic medications he has been given. Weight loss of 9# x 1 week. Results:   Diagnosis: Testicular cancer   Chemotherapy Flowsheet 6/17/2019   Cycle C2D15   Date 6/17/2019   Drug / Regimen Bleomycin   Pre Hydration -   Pre Meds -   Notes -     Wt Readings from Last 8 Encounters:   06/17/19 227 lb (103 kg)   06/17/19 227 lb 8 oz (103.2 kg)   06/10/19 236 lb 6 oz (107.2 kg)   06/10/19 236 lb (107 kg)   06/07/19 249 lb 3.2 oz (113 kg)   06/06/19 252 lb 12.8 oz (114.7 kg)   06/05/19 251 lb (113.9 kg)   06/05/19 250 lb (113.4 kg)       Estimated Nutrition Needs:   Calorie Range: 2030-2575kcal/day     Protein Range: 80-90g/day      Fluid Needs: 2000ml     Assessment:   Inadequate oral food or beverage intake related to chemotherapy as evidence by nausea and vomiting, only able to consume water. Plan:   · Try small, frequent meals. · Do not go more than a few hours without eating during the day. · Take liquids between meals/sips throughout the day. · Try room temperature or cold foods. · Try dry, starchy and/or salty foods (pretzels, saltines, potatoes, noodle). ·  Try peppermint candies. ·  Avoid strong odors. · Attempt to eat a small amount in the morning before getting out of bed.     · Attempt to eat something 30 minutes before laying down to sleep at night. I appreciate the opportunity to participate in Mr. Navdi Green's care.     Signed By: Odell Bailey, [address] [place] [address], [address] [place] [address] , [address]     Contact: [phone]

## 2019-06-17 NOTE — PROGRESS NOTES
[facility]  Social Work Navigator Encounter     Patient Name:  Corrina Curry    Medical History:     Advance Directives:    Narrative: Pt care card was submitted by Elissa Ramirez, [facility] financial counselor on April 30, 2019. SW discussed Medicaid application. Pt needs to send copy of insurance card to [employer] for [employer] application processing. Pt to come back on Wednesday and SW asked that he bring a copy of the letter. ONN obtained copy of the card and is faxing to [employer]. Pt still not eating, says he is only drinking water. Pt has lost 9 lbs since last office visit(week). SW asked dietician to see him. July 13th- [employer] Back is hold a benefit for him in [place]. Pt has no income and therefore can't afford to buy Pedialyte or other items. Barriers to Care:     Assessment/Action:    Plan/Referral:     Behavioral health referral  Left a msg with behavioral health to see if they would see him but pt advises he doesn't want to go. Pt also   Complementary therapies referral  Try to encourage the mindfulness relation therapy at [facility].      Other referral

## 2019-06-17 NOTE — PROGRESS NOTES
Pt arrived to [facility] ambulatory for C2D15 Bleo in no acute distress at 1105.  Assessment unremarkable except pt reports chest pain (4/10), nausea/vomiting since Saturday and not keeping anything down but water. R chest port accessed without issue and positive blood return noted.  Labs obtained- CBCap. Pt to MD office for follow-up appointment. Visit Vitals  /89 (BP 1 Location: Left arm, BP Patient Position: Sitting)   Pulse (!) 102   Temp 99 °F (37.2 °C)   Resp 18   Ht 5' 8\" (1.727 m)   Wt 103.2 kg (227 lb 8 oz)   SpO2 97%   BMI 34.59 kg/m²     Recent Results (from the past 12 hour(s))   CBC WITH AUTOMATED DIFF    Collection Time: 06/17/19 11:31 AM   Result Value Ref Range    WBC 1.1 (L) 4.1 - 11.1 K/uL    RBC 3.98 (L) 4.10 - 5.70 M/uL    HGB 12.6 12.1 - 17.0 g/dL    HCT 34.4 (L) 36.6 - 50.3 %    MCV 86.4 80.0 - 99.0 FL    MCH 31.7 26.0 - 34.0 PG    MCHC 36.6 (H) 30.0 - 36.5 g/dL    RDW 13.2 11.5 - 14.5 %    PLATELET 126 (L) 713 - 400 K/uL    MPV 10.1 8.9 - 12.9 FL    NRBC 0.0 0  WBC    ABSOLUTE NRBC 0.00 0.00 - 0.01 K/uL    NEUTROPHILS 40 32 - 75 %    LYMPHOCYTES 36 12 - 49 %    MONOCYTES 24 (H) 5 - 13 %    EOSINOPHILS 0 0 - 7 %    BASOPHILS 0 0 - 1 %    IMMATURE GRANULOCYTES 0 0.0 - 0.5 %    ABS. NEUTROPHILS 0.4 (L) 1.8 - 8.0 K/UL    ABS. LYMPHOCYTES 0.4 (L) 0.8 - 3.5 K/UL    ABS. MONOCYTES 0.3 0.0 - 1.0 K/UL    ABS. EOSINOPHILS 0.0 0.0 - 0.4 K/UL    ABS. BASOPHILS 0.0 0.0 - 0.1 K/UL    ABS. IMM.  GRANS. 0.0 0.00 - 0.04 K/UL    DF MANUAL      RBC COMMENTS ANISOCYTOSIS  PRESENT        RBC COMMENTS POLYCHROMASIA  PRESENT        WBC COMMENTS REACTIVE LYMPHS         The following medications administered:  NS 1 L IV bolus over 1 hour  Tylenol 650 mg PO  Benadryl 25 mg PO  Bleomycin 30 Units IV over 10 minutes    Visit Vitals  /85 (BP 1 Location: Right arm, BP Patient Position: Sitting)   Pulse 69   Temp 99 °F (37.2 °C)   Resp 18   Ht 5' 8\" (1.727 m)   Wt 103.2 kg (227 lb 8 oz)   SpO2 97% BMI 34.59 kg/m²       Pt tolerated treatment well.  No adverse reaction noted. Port flushed per policy and removed, 2x2 and paper tape placed.  Pt discharged ambulatory in no acute distress at 1405, accompanied by family member. Next appointment 6/20/19 @ 1600.

## 2019-06-17 NOTE — PROGRESS NOTES
[address], [address] [place] [address] Juan M Gilineau, 200 [facility]  [phone]       Oncology progress note        Patient: Bri Marti MRN: [id number]  SSN: [id number]    YOB: 1985  Age: 29 y.o. Sex: male        Diagnosis:      1. 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 Right sided radical orchiectomy  2. Systemic chemotherapy,   BEP - Cycle 2 Day 15    Subjective:      Bri Marti is a 29 y.o. male male who noted a swelling in the right testicles for over 6 months. The swelling was associated with a dragging sensation. The patient then was seen by Dr. Merly Gleason. He underwent a right radical orchiectomy on 03/27/2019. The pathology shows T1c disease. A CT scan was done on 04/03 which reveals metastatic disease. Mr. Torrie Marcum is receiving his second cycle of BEP. He was hospitalized after his first cycle of treatment with neutropenic fever. He has been having intractable nausea and vomiting. He is on a number of PRN meds. His symptoms are out of proportion to his appearance. Review of Systems:    Constitutional: negative  Eyes: negative  Ears, Nose, Mouth, Throat, and Face: negative  Respiratory: negative  Cardiovascular: negative  Gastrointestinal: nausea/vomiting  Genitourinary:negative  Integument/Breast: negative  Hematologic/Lymphatic: negative  Musculoskeletal:negative  Neurological: negative      Past Medical History:   Diagnosis Date    Anxiety disorder     Depression     Suicidal thoughts     Testicle cancer ([facility] [address]      Past Surgical History:   Procedure Laterality Date    HX ORTHOPAEDIC      lft knee surgery    HX ORTHOPAEDIC      right shoulder/right knee    IR INSERT TUNL CVC W PORT OVER 5 YEARS  4/18/2019      History reviewed. No pertinent family history.   Social History     Tobacco Use    Smoking status: Current Every Day Smoker     Packs/day: 0.50    Smokeless tobacco: Former User   Substance Use Topics    Alcohol use: Yes     Comment: per pt \"once a weekend 6 or 7 beers\"      Prior to Admission medications    Medication Sig Start Date End Date Taking? Authorizing Provider   promethazine (PHENERGAN) 25 mg tablet Take 1 Tab by mouth every six (6) hours as needed for Nausea. 6/10/19  Yes Riri Pan MD   OLANZapine (ZYPREXA) 10 mg tablet Take 1 Tab by mouth nightly. Take morning of chemo and once daily for 4 days following chemo 6/10/19  Yes Riri Pan MD   dexamethasone (DECADRON) 4 mg tablet Take 8 mg by mouth two (2) times daily (with meals). 6/10/19  Yes Riri Pan MD   ondansetron (ZOFRAN ODT) 4 mg disintegrating tablet Take 1 Tab by mouth every eight (8) hours as needed for Nausea. 4/8/19  Yes Janae Sanders NP   lidocaine-prilocaine (EMLA) topical cream Apply  to affected area as needed for Pain. 4/8/19  Yes Wai CHEEK, NP   prochlorperazine (COMPAZINE) 10 mg tablet Take 5 mg by mouth every six (6) hours as needed for Nausea. Provider, Historical            No Known Allergies        Objective:     Visit Vitals  /75 (BP 1 Location: Left arm, BP Patient Position: Sitting)   Pulse 95   Temp 98.6 °F (37 °C) (Oral)   Resp 18   Ht 5' 8\" (1.727 m)   Wt 227 lb (103 kg)   SpO2 95%   BMI 34.52 kg/m²       Pain Scale: 0 - No pain/10  Pain Location:       Physical Exam:    GENERAL: alert, cooperative, no distress, appears stated age  EYE: conjunctivae/corneas clear. PERRL, EOM's intact. Fundi benign  LYMPHATIC: Cervical, supraclavicular, and axillary nodes normal.   THROAT & NECK: normal and no erythema or exudates noted. LUNG: clear to auscultation bilaterally  HEART: regular rate and rhythm, S1, S2 normal, no murmur, click, rub or gallop  ABDOMEN: soft, non-tender.  Bowel sounds normal. No masses,  no organomegaly  EXTREMITIES:  extremities normal, atraumatic, no cyanosis or edema  SKIN: extensive tattoo on the torso and arms  NEUROLOGIC: AOx3. Gait normal. Reflexes and motor strength normal and symmetric. Cranial nerves 2-12 and sensation grossly intact. CT Results (most recent):  Results from Hospital Encounter encounter on 05/12/19   CT NECK SOFT TISSUE W CONT    Narrative EXAM: CT SOFT TISSUE NECK with Contrast    INDICATION:  Neutropenic fever. mouth and neck pain r/o abscess      TECHNIQUE: Multislice helical CT was performed from the skull base to the  thoracic inlet with 100 mL Isovue 300 IV. Contiguous 2.5 mm axial images were  reconstructed and coronal and sagittal 2D reformations were generated. CT dose  reduction was achieved through use of a standardized protocol tailored for this  examination and automatic exposure control for dose modulation. FINDINGS:  [place] tonsils are symmetrically slightly large, with central subcentimeter  mild hypodensity, not conclusive but difficult to exclude developing tonsillar  abscess particularly on the right. There are dental caries without apparent periapical or floor of mouth abscess. No mass or significant adenopathy is identified within the neck. The carotid and jugular vessels are patent. The submandibular and parotid glands are normal. Thyroid is unremarkable. No laryngeal edema or mass is identified. Deep parapharyngeal and  retropharyngeal regions are normal. Subglottic airway is clear. No abnormalities are identified in the skull base. The visualized mastoid air cells and paranasal sinuses are clear. The lung apices are clear. Impression IMPRESSION:   1. Prominent tonsils with questionable developing abscess on the right-correlate  clinically. 2. Dental caries without apparent periapical abscess.          Lab Results   Component Value Date/Time    WBC 1.1 (L) 06/17/2019 11:31 AM    HGB 12.6 06/17/2019 11:31 AM    HCT 34.4 (L) 06/17/2019 11:31 AM    PLATELET 342 (L) 55/97/6032 11:31 AM MCV 86.4 06/17/2019 11:31 AM       Lab Results   Component Value Date/Time    Sodium 139 06/03/2019 09:03 AM    Potassium 3.7 06/03/2019 09:03 AM    Chloride 109 (H) 06/03/2019 09:03 AM    CO2 25 06/03/2019 09:03 AM    Anion gap 5 06/03/2019 09:03 AM    Glucose 111 (H) 06/03/2019 09:03 AM    BUN 12 06/03/2019 09:03 AM    Creatinine 0.96 06/03/2019 09:03 AM    BUN/Creatinine ratio 13 06/03/2019 09:03 AM    GFR est AA >60 06/03/2019 09:03 AM    GFR est non-AA >60 06/03/2019 09:03 AM    Calcium 8.5 06/03/2019 09:03 AM    Bilirubin, total 0.5 06/03/2019 09:03 AM    AST (SGOT) 14 (L) 06/03/2019 09:03 AM    Alk. phosphatase 80 06/03/2019 09:03 AM    Protein, total 6.6 06/03/2019 09:03 AM    Albumin 3.5 06/03/2019 09:03 AM    Globulin 3.1 06/03/2019 09:03 AM    A-G Ratio 1.1 06/03/2019 09:03 AM    ALT (SGPT) 51 06/03/2019 09:03 AM           Assessment:     1. 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 A detailed system by system evaluation of side effect was performed to assess chemotherapy related toxicity. Blood counts are acceptable. Results reviewed with the patient    Symptom management form reviewed and scanned into the EMR under Media.       2. Nausea/vomiting, CINV    > Aloxi and dexamethasone on treatment days  > Zofran and compazine not effective  > phenergan 25 mg q6hr as needed  > dexamethasone 8 mg po BID x 5 days following chemotherapy  > olanzapine 10 mg po daily starting the day of chemotherapy x 4 days  > IV fluids in OPIC twice weekly         Plan:       · Labs today: CBC, CMP, AFP, Beta hCG, LDH  · Continue with systemic chemotherapy  · Prophylactic antiemetics: Ondansetron and Dexamethasone IV days 1-5  · PRN antiemetics: Ondansetron and Prochlorperazine  · Additional antiemetics: phenergan, zyprexa, and dexamethasone  · Fluids in [facility] twice a week  · Return for follow up in 2 weeks        Signed by: Malik Valdivia MD                     June 17, 2019        CC. Chance Díaz MD  CC.  Rylan Khan MD

## 2019-06-17 NOTE — PROGRESS NOTES
Identified pt with two pt identifiers(name and ). Reviewed record in preparation for visit and have obtained necessary documentation. Chief Complaint   Patient presents with    Testicular Cancer     D15C2  BEP      Visit Vitals  /75 (BP 1 Location: Left arm, BP Patient Position: Sitting)   Pulse 95   Temp 98.6 °F (37 °C) (Oral)   Resp 18   Ht 5' 8\" (1.727 m)   Wt 227 lb (103 kg)   SpO2 95%   BMI 34.52 kg/m²         Health Maintenance Due   Topic    Pneumococcal 0-64 years (1 of 3 - PCV13)    DTaP/Tdap/Td series (1 - Tdap)       Coordination of Care Questionnaire:  :   1) Have you been to an emergency room, urgent care, or hospitalized since your last visit? If yes, where when, and reason for visit? no       2. Have seen or consulted any other health care provider since your last visit? If yes, where when, and reason for visit? NO      3) Do you have an Advanced Directive/ Living Will in place? NO  If yes, do we have a copy on file NO  If no, would you like information NO    Patient is accompanied by sister I have received verbal consent from Iman Kumar to discuss any/all medical information while they are present in the room.

## 2019-06-19 RX ORDER — DIPHENHYDRAMINE HYDROCHLORIDE 50 MG/ML
50 INJECTION, SOLUTION INTRAMUSCULAR; INTRAVENOUS AS NEEDED
Status: CANCELLED
Start: 2019-07-22

## 2019-06-19 RX ORDER — SODIUM CHLORIDE 9 MG/ML
10 INJECTION INTRAMUSCULAR; INTRAVENOUS; SUBCUTANEOUS AS NEEDED
Status: CANCELLED | OUTPATIENT
Start: 2019-06-25

## 2019-06-19 RX ORDER — SODIUM CHLORIDE 0.9 % (FLUSH) 0.9 %
10 SYRINGE (ML) INJECTION AS NEEDED
Status: CANCELLED
Start: 2019-07-16

## 2019-06-19 RX ORDER — EPINEPHRINE 1 MG/ML
0.3 INJECTION, SOLUTION, CONCENTRATE INTRAVENOUS AS NEEDED
Status: CANCELLED | OUTPATIENT
Start: 2019-06-28

## 2019-06-19 RX ORDER — DIPHENHYDRAMINE HYDROCHLORIDE 50 MG/ML
50 INJECTION, SOLUTION INTRAMUSCULAR; INTRAVENOUS AS NEEDED
Status: CANCELLED
Start: 2019-07-08

## 2019-06-19 RX ORDER — ONDANSETRON 2 MG/ML
8 INJECTION INTRAMUSCULAR; INTRAVENOUS AS NEEDED
Status: CANCELLED | OUTPATIENT
Start: 2019-07-18

## 2019-06-19 RX ORDER — SODIUM CHLORIDE 0.9 % (FLUSH) 0.9 %
10 SYRINGE (ML) INJECTION AS NEEDED
Status: CANCELLED
Start: 2019-06-27

## 2019-06-19 RX ORDER — DIPHENHYDRAMINE HYDROCHLORIDE 50 MG/ML
50 INJECTION, SOLUTION INTRAMUSCULAR; INTRAVENOUS AS NEEDED
Status: CANCELLED
Start: 2019-07-19

## 2019-06-19 RX ORDER — HYDROCORTISONE SODIUM SUCCINATE 100 MG/2ML
100 INJECTION, POWDER, FOR SOLUTION INTRAMUSCULAR; INTRAVENOUS AS NEEDED
Status: CANCELLED | OUTPATIENT
Start: 2019-06-28

## 2019-06-19 RX ORDER — HYDROCORTISONE SODIUM SUCCINATE 100 MG/2ML
100 INJECTION, POWDER, FOR SOLUTION INTRAMUSCULAR; INTRAVENOUS AS NEEDED
Status: CANCELLED | OUTPATIENT
Start: 2019-06-24

## 2019-06-19 RX ORDER — ONDANSETRON 2 MG/ML
8 INJECTION INTRAMUSCULAR; INTRAVENOUS AS NEEDED
Status: CANCELLED | OUTPATIENT
Start: 2019-07-22

## 2019-06-19 RX ORDER — ACETAMINOPHEN 325 MG/1
650 TABLET ORAL ONCE
Status: CANCELLED
Start: 2019-06-24

## 2019-06-19 RX ORDER — ONDANSETRON 2 MG/ML
8 INJECTION INTRAMUSCULAR; INTRAVENOUS AS NEEDED
Status: CANCELLED | OUTPATIENT
Start: 2019-07-29

## 2019-06-19 RX ORDER — SODIUM CHLORIDE 9 MG/ML
10 INJECTION INTRAMUSCULAR; INTRAVENOUS; SUBCUTANEOUS AS NEEDED
Status: CANCELLED | OUTPATIENT
Start: 2019-07-15

## 2019-06-19 RX ORDER — SODIUM CHLORIDE 9 MG/ML
10 INJECTION INTRAMUSCULAR; INTRAVENOUS; SUBCUTANEOUS AS NEEDED
Status: CANCELLED | OUTPATIENT
Start: 2019-07-22

## 2019-06-19 RX ORDER — DIPHENHYDRAMINE HYDROCHLORIDE 50 MG/ML
25 INJECTION, SOLUTION INTRAMUSCULAR; INTRAVENOUS ONCE
Status: CANCELLED
Start: 2019-07-08

## 2019-06-19 RX ORDER — SODIUM CHLORIDE 9 MG/ML
25 INJECTION, SOLUTION INTRAVENOUS CONTINUOUS
Status: CANCELLED | OUTPATIENT
Start: 2019-06-24

## 2019-06-19 RX ORDER — ONDANSETRON 2 MG/ML
8 INJECTION INTRAMUSCULAR; INTRAVENOUS AS NEEDED
Status: CANCELLED | OUTPATIENT
Start: 2019-07-08

## 2019-06-19 RX ORDER — SODIUM CHLORIDE 0.9 % (FLUSH) 0.9 %
10 SYRINGE (ML) INJECTION AS NEEDED
Status: CANCELLED
Start: 2019-07-01

## 2019-06-19 RX ORDER — ONDANSETRON 2 MG/ML
8 INJECTION INTRAMUSCULAR; INTRAVENOUS ONCE
Status: CANCELLED | OUTPATIENT
Start: 2019-07-16

## 2019-06-19 RX ORDER — DIPHENHYDRAMINE HYDROCHLORIDE 50 MG/ML
50 INJECTION, SOLUTION INTRAMUSCULAR; INTRAVENOUS AS NEEDED
Status: CANCELLED
Start: 2019-07-16

## 2019-06-19 RX ORDER — PALONOSETRON 0.05 MG/ML
0.25 INJECTION, SOLUTION INTRAVENOUS ONCE
Status: CANCELLED
Start: 2019-07-19

## 2019-06-19 RX ORDER — HEPARIN 100 UNIT/ML
300-500 SYRINGE INTRAVENOUS AS NEEDED
Status: CANCELLED
Start: 2019-06-25

## 2019-06-19 RX ORDER — HYDROCORTISONE SODIUM SUCCINATE 100 MG/2ML
100 INJECTION, POWDER, FOR SOLUTION INTRAMUSCULAR; INTRAVENOUS AS NEEDED
Status: CANCELLED | OUTPATIENT
Start: 2019-06-27

## 2019-06-19 RX ORDER — HYDROCORTISONE SODIUM SUCCINATE 100 MG/2ML
100 INJECTION, POWDER, FOR SOLUTION INTRAMUSCULAR; INTRAVENOUS AS NEEDED
Status: CANCELLED | OUTPATIENT
Start: 2019-07-16

## 2019-06-19 RX ORDER — HYDROCORTISONE SODIUM SUCCINATE 100 MG/2ML
100 INJECTION, POWDER, FOR SOLUTION INTRAMUSCULAR; INTRAVENOUS AS NEEDED
Status: CANCELLED | OUTPATIENT
Start: 2019-06-25

## 2019-06-19 RX ORDER — HYDROCORTISONE SODIUM SUCCINATE 100 MG/2ML
100 INJECTION, POWDER, FOR SOLUTION INTRAMUSCULAR; INTRAVENOUS AS NEEDED
Status: CANCELLED | OUTPATIENT
Start: 2019-06-26

## 2019-06-19 RX ORDER — HYDROCORTISONE SODIUM SUCCINATE 100 MG/2ML
100 INJECTION, POWDER, FOR SOLUTION INTRAMUSCULAR; INTRAVENOUS AS NEEDED
Status: CANCELLED | OUTPATIENT
Start: 2019-07-18

## 2019-06-19 RX ORDER — ALBUTEROL SULFATE 0.83 MG/ML
2.5 SOLUTION RESPIRATORY (INHALATION) AS NEEDED
Status: CANCELLED
Start: 2019-06-27

## 2019-06-19 RX ORDER — ALBUTEROL SULFATE 0.83 MG/ML
2.5 SOLUTION RESPIRATORY (INHALATION) AS NEEDED
Status: CANCELLED
Start: 2019-06-24

## 2019-06-19 RX ORDER — SODIUM CHLORIDE 9 MG/ML
10 INJECTION INTRAMUSCULAR; INTRAVENOUS; SUBCUTANEOUS AS NEEDED
Status: CANCELLED | OUTPATIENT
Start: 2019-07-19

## 2019-06-19 RX ORDER — DIPHENHYDRAMINE HYDROCHLORIDE 50 MG/ML
25 INJECTION, SOLUTION INTRAMUSCULAR; INTRAVENOUS ONCE
Status: CANCELLED
Start: 2019-07-01

## 2019-06-19 RX ORDER — HEPARIN 100 UNIT/ML
300-500 SYRINGE INTRAVENOUS AS NEEDED
Status: CANCELLED
Start: 2019-07-19

## 2019-06-19 RX ORDER — ALBUTEROL SULFATE 0.83 MG/ML
2.5 SOLUTION RESPIRATORY (INHALATION) AS NEEDED
Status: CANCELLED
Start: 2019-07-01

## 2019-06-19 RX ORDER — ALBUTEROL SULFATE 0.83 MG/ML
2.5 SOLUTION RESPIRATORY (INHALATION) AS NEEDED
Status: CANCELLED
Start: 2019-06-25

## 2019-06-19 RX ORDER — DIPHENHYDRAMINE HYDROCHLORIDE 50 MG/ML
50 INJECTION, SOLUTION INTRAMUSCULAR; INTRAVENOUS AS NEEDED
Status: CANCELLED
Start: 2019-06-25

## 2019-06-19 RX ORDER — ALBUTEROL SULFATE 0.83 MG/ML
2.5 SOLUTION RESPIRATORY (INHALATION) AS NEEDED
Status: CANCELLED
Start: 2019-06-28

## 2019-06-19 RX ORDER — ACETAMINOPHEN 325 MG/1
650 TABLET ORAL ONCE
Status: CANCELLED
Start: 2019-07-15

## 2019-06-19 RX ORDER — ONDANSETRON 2 MG/ML
8 INJECTION INTRAMUSCULAR; INTRAVENOUS AS NEEDED
Status: CANCELLED | OUTPATIENT
Start: 2019-07-16

## 2019-06-19 RX ORDER — ONDANSETRON 2 MG/ML
8 INJECTION INTRAMUSCULAR; INTRAVENOUS AS NEEDED
Status: CANCELLED | OUTPATIENT
Start: 2019-07-15

## 2019-06-19 RX ORDER — DIPHENHYDRAMINE HYDROCHLORIDE 50 MG/ML
50 INJECTION, SOLUTION INTRAMUSCULAR; INTRAVENOUS AS NEEDED
Status: CANCELLED
Start: 2019-07-15

## 2019-06-19 RX ORDER — ONDANSETRON 2 MG/ML
8 INJECTION INTRAMUSCULAR; INTRAVENOUS ONCE
Status: CANCELLED | OUTPATIENT
Start: 2019-06-25

## 2019-06-19 RX ORDER — DIPHENHYDRAMINE HYDROCHLORIDE 50 MG/ML
50 INJECTION, SOLUTION INTRAMUSCULAR; INTRAVENOUS AS NEEDED
Status: CANCELLED
Start: 2019-06-27

## 2019-06-19 RX ORDER — DIPHENHYDRAMINE HYDROCHLORIDE 50 MG/ML
50 INJECTION, SOLUTION INTRAMUSCULAR; INTRAVENOUS AS NEEDED
Status: CANCELLED
Start: 2019-06-28

## 2019-06-19 RX ORDER — DIPHENHYDRAMINE HYDROCHLORIDE 50 MG/ML
25 INJECTION, SOLUTION INTRAMUSCULAR; INTRAVENOUS ONCE
Status: CANCELLED
Start: 2019-07-29

## 2019-06-19 RX ORDER — SODIUM CHLORIDE 9 MG/ML
25 INJECTION, SOLUTION INTRAVENOUS CONTINUOUS
Status: CANCELLED | OUTPATIENT
Start: 2019-06-26

## 2019-06-19 RX ORDER — HYDROCORTISONE SODIUM SUCCINATE 100 MG/2ML
100 INJECTION, POWDER, FOR SOLUTION INTRAMUSCULAR; INTRAVENOUS AS NEEDED
Status: CANCELLED | OUTPATIENT
Start: 2019-07-08

## 2019-06-19 RX ORDER — ACETAMINOPHEN 325 MG/1
650 TABLET ORAL ONCE
Status: CANCELLED
Start: 2019-07-08

## 2019-06-19 RX ORDER — HEPARIN 100 UNIT/ML
300-500 SYRINGE INTRAVENOUS AS NEEDED
Status: CANCELLED
Start: 2019-07-01

## 2019-06-19 RX ORDER — ONDANSETRON 2 MG/ML
8 INJECTION INTRAMUSCULAR; INTRAVENOUS ONCE
Status: CANCELLED | OUTPATIENT
Start: 2019-06-24

## 2019-06-19 RX ORDER — SODIUM CHLORIDE 9 MG/ML
10 INJECTION INTRAMUSCULAR; INTRAVENOUS; SUBCUTANEOUS AS NEEDED
Status: CANCELLED | OUTPATIENT
Start: 2019-07-08

## 2019-06-19 RX ORDER — SODIUM CHLORIDE 0.9 % (FLUSH) 0.9 %
10 SYRINGE (ML) INJECTION AS NEEDED
Status: CANCELLED
Start: 2019-07-17

## 2019-06-19 RX ORDER — SODIUM CHLORIDE 9 MG/ML
25 INJECTION, SOLUTION INTRAVENOUS CONTINUOUS
Status: CANCELLED | OUTPATIENT
Start: 2019-07-17

## 2019-06-19 RX ORDER — SODIUM CHLORIDE 0.9 % (FLUSH) 0.9 %
10 SYRINGE (ML) INJECTION AS NEEDED
Status: CANCELLED
Start: 2019-06-28

## 2019-06-19 RX ORDER — ACETAMINOPHEN 325 MG/1
650 TABLET ORAL AS NEEDED
Status: CANCELLED
Start: 2019-07-16

## 2019-06-19 RX ORDER — ONDANSETRON 2 MG/ML
8 INJECTION INTRAMUSCULAR; INTRAVENOUS AS NEEDED
Status: CANCELLED | OUTPATIENT
Start: 2019-07-19

## 2019-06-19 RX ORDER — SODIUM CHLORIDE 9 MG/ML
10 INJECTION INTRAMUSCULAR; INTRAVENOUS; SUBCUTANEOUS AS NEEDED
Status: CANCELLED | OUTPATIENT
Start: 2019-07-01

## 2019-06-19 RX ORDER — ALBUTEROL SULFATE 0.83 MG/ML
2.5 SOLUTION RESPIRATORY (INHALATION) AS NEEDED
Status: CANCELLED
Start: 2019-06-26

## 2019-06-19 RX ORDER — HYDROCORTISONE SODIUM SUCCINATE 100 MG/2ML
100 INJECTION, POWDER, FOR SOLUTION INTRAMUSCULAR; INTRAVENOUS AS NEEDED
Status: CANCELLED | OUTPATIENT
Start: 2019-07-15

## 2019-06-19 RX ORDER — ACETAMINOPHEN 325 MG/1
650 TABLET ORAL AS NEEDED
Status: CANCELLED
Start: 2019-06-26

## 2019-06-19 RX ORDER — EPINEPHRINE 1 MG/ML
0.3 INJECTION, SOLUTION, CONCENTRATE INTRAVENOUS AS NEEDED
Status: CANCELLED | OUTPATIENT
Start: 2019-06-25

## 2019-06-19 RX ORDER — DIPHENHYDRAMINE HYDROCHLORIDE 50 MG/ML
50 INJECTION, SOLUTION INTRAMUSCULAR; INTRAVENOUS AS NEEDED
Status: CANCELLED
Start: 2019-07-17

## 2019-06-19 RX ORDER — EPINEPHRINE 1 MG/ML
0.3 INJECTION, SOLUTION, CONCENTRATE INTRAVENOUS AS NEEDED
Status: CANCELLED | OUTPATIENT
Start: 2019-06-24

## 2019-06-19 RX ORDER — SODIUM CHLORIDE 9 MG/ML
25 INJECTION, SOLUTION INTRAVENOUS CONTINUOUS
Status: CANCELLED | OUTPATIENT
Start: 2019-07-16

## 2019-06-19 RX ORDER — EPINEPHRINE 1 MG/ML
0.3 INJECTION, SOLUTION, CONCENTRATE INTRAVENOUS AS NEEDED
Status: CANCELLED | OUTPATIENT
Start: 2019-07-22

## 2019-06-19 RX ORDER — HEPARIN 100 UNIT/ML
300-500 SYRINGE INTRAVENOUS AS NEEDED
Status: CANCELLED
Start: 2019-07-17

## 2019-06-19 RX ORDER — SODIUM CHLORIDE 9 MG/ML
25 INJECTION, SOLUTION INTRAVENOUS CONTINUOUS
Status: CANCELLED | OUTPATIENT
Start: 2019-07-19

## 2019-06-19 RX ORDER — ALBUTEROL SULFATE 0.83 MG/ML
2.5 SOLUTION RESPIRATORY (INHALATION) AS NEEDED
Status: CANCELLED
Start: 2019-07-17

## 2019-06-19 RX ORDER — ONDANSETRON 2 MG/ML
8 INJECTION INTRAMUSCULAR; INTRAVENOUS ONCE
Status: CANCELLED | OUTPATIENT
Start: 2019-06-26

## 2019-06-19 RX ORDER — SODIUM CHLORIDE 9 MG/ML
25 INJECTION, SOLUTION INTRAVENOUS CONTINUOUS
Status: CANCELLED | OUTPATIENT
Start: 2019-06-27

## 2019-06-19 RX ORDER — ALBUTEROL SULFATE 0.83 MG/ML
2.5 SOLUTION RESPIRATORY (INHALATION) AS NEEDED
Status: CANCELLED
Start: 2019-07-22

## 2019-06-19 RX ORDER — ONDANSETRON 2 MG/ML
8 INJECTION INTRAMUSCULAR; INTRAVENOUS AS NEEDED
Status: CANCELLED | OUTPATIENT
Start: 2019-06-24

## 2019-06-19 RX ORDER — EPINEPHRINE 1 MG/ML
0.3 INJECTION, SOLUTION, CONCENTRATE INTRAVENOUS AS NEEDED
Status: CANCELLED | OUTPATIENT
Start: 2019-07-16

## 2019-06-19 RX ORDER — ACETAMINOPHEN 325 MG/1
650 TABLET ORAL AS NEEDED
Status: CANCELLED
Start: 2019-07-29

## 2019-06-19 RX ORDER — DIPHENHYDRAMINE HYDROCHLORIDE 50 MG/ML
50 INJECTION, SOLUTION INTRAMUSCULAR; INTRAVENOUS AS NEEDED
Status: CANCELLED
Start: 2019-07-01

## 2019-06-19 RX ORDER — ONDANSETRON 2 MG/ML
8 INJECTION INTRAMUSCULAR; INTRAVENOUS ONCE
Status: CANCELLED | OUTPATIENT
Start: 2019-06-27

## 2019-06-19 RX ORDER — EPINEPHRINE 1 MG/ML
0.3 INJECTION, SOLUTION, CONCENTRATE INTRAVENOUS AS NEEDED
Status: CANCELLED | OUTPATIENT
Start: 2019-07-15

## 2019-06-19 RX ORDER — SODIUM CHLORIDE 9 MG/ML
10 INJECTION INTRAMUSCULAR; INTRAVENOUS; SUBCUTANEOUS AS NEEDED
Status: CANCELLED | OUTPATIENT
Start: 2019-06-28

## 2019-06-19 RX ORDER — ONDANSETRON 2 MG/ML
8 INJECTION INTRAMUSCULAR; INTRAVENOUS AS NEEDED
Status: CANCELLED | OUTPATIENT
Start: 2019-06-27

## 2019-06-19 RX ORDER — SODIUM CHLORIDE 9 MG/ML
25 INJECTION, SOLUTION INTRAVENOUS CONTINUOUS
Status: CANCELLED | OUTPATIENT
Start: 2019-06-25

## 2019-06-19 RX ORDER — SODIUM CHLORIDE 9 MG/ML
10 INJECTION INTRAMUSCULAR; INTRAVENOUS; SUBCUTANEOUS AS NEEDED
Status: CANCELLED | OUTPATIENT
Start: 2019-07-18

## 2019-06-19 RX ORDER — HEPARIN 100 UNIT/ML
300-500 SYRINGE INTRAVENOUS AS NEEDED
Status: CANCELLED
Start: 2019-06-26

## 2019-06-19 RX ORDER — ALBUTEROL SULFATE 0.83 MG/ML
2.5 SOLUTION RESPIRATORY (INHALATION) AS NEEDED
Status: CANCELLED
Start: 2019-07-19

## 2019-06-19 RX ORDER — SODIUM CHLORIDE 0.9 % (FLUSH) 0.9 %
10 SYRINGE (ML) INJECTION AS NEEDED
Status: CANCELLED
Start: 2019-07-15

## 2019-06-19 RX ORDER — SODIUM CHLORIDE 9 MG/ML
25 INJECTION, SOLUTION INTRAVENOUS CONTINUOUS
Status: CANCELLED | OUTPATIENT
Start: 2019-06-28

## 2019-06-19 RX ORDER — DIPHENHYDRAMINE HYDROCHLORIDE 50 MG/ML
50 INJECTION, SOLUTION INTRAMUSCULAR; INTRAVENOUS AS NEEDED
Status: CANCELLED
Start: 2019-07-18

## 2019-06-19 RX ORDER — HYDROCORTISONE SODIUM SUCCINATE 100 MG/2ML
100 INJECTION, POWDER, FOR SOLUTION INTRAMUSCULAR; INTRAVENOUS AS NEEDED
Status: CANCELLED | OUTPATIENT
Start: 2019-07-29

## 2019-06-19 RX ORDER — SODIUM CHLORIDE 9 MG/ML
10 INJECTION INTRAMUSCULAR; INTRAVENOUS; SUBCUTANEOUS AS NEEDED
Status: CANCELLED | OUTPATIENT
Start: 2019-06-24

## 2019-06-19 RX ORDER — ONDANSETRON 2 MG/ML
8 INJECTION INTRAMUSCULAR; INTRAVENOUS ONCE
Status: CANCELLED | OUTPATIENT
Start: 2019-07-17

## 2019-06-19 RX ORDER — HEPARIN 100 UNIT/ML
300-500 SYRINGE INTRAVENOUS AS NEEDED
Status: CANCELLED
Start: 2019-06-28

## 2019-06-19 RX ORDER — ACETAMINOPHEN 325 MG/1
650 TABLET ORAL AS NEEDED
Status: CANCELLED
Start: 2019-07-22

## 2019-06-19 RX ORDER — DIPHENHYDRAMINE HYDROCHLORIDE 50 MG/ML
50 INJECTION, SOLUTION INTRAMUSCULAR; INTRAVENOUS AS NEEDED
Status: CANCELLED
Start: 2019-06-24

## 2019-06-19 RX ORDER — ALBUTEROL SULFATE 0.83 MG/ML
2.5 SOLUTION RESPIRATORY (INHALATION) AS NEEDED
Status: CANCELLED
Start: 2019-07-18

## 2019-06-19 RX ORDER — ACETAMINOPHEN 325 MG/1
650 TABLET ORAL AS NEEDED
Status: CANCELLED
Start: 2019-06-24

## 2019-06-19 RX ORDER — SODIUM CHLORIDE 0.9 % (FLUSH) 0.9 %
10 SYRINGE (ML) INJECTION AS NEEDED
Status: CANCELLED
Start: 2019-06-26

## 2019-06-19 RX ORDER — SODIUM CHLORIDE 0.9 % (FLUSH) 0.9 %
10 SYRINGE (ML) INJECTION AS NEEDED
Status: CANCELLED
Start: 2019-07-08

## 2019-06-19 RX ORDER — ACETAMINOPHEN 325 MG/1
650 TABLET ORAL ONCE
Status: CANCELLED
Start: 2019-07-29

## 2019-06-19 RX ORDER — SODIUM CHLORIDE 9 MG/ML
25 INJECTION, SOLUTION INTRAVENOUS CONTINUOUS
Status: CANCELLED
Start: 2019-07-22

## 2019-06-19 RX ORDER — SODIUM CHLORIDE 9 MG/ML
25 INJECTION, SOLUTION INTRAVENOUS CONTINUOUS
Status: CANCELLED
Start: 2019-07-29

## 2019-06-19 RX ORDER — HYDROCORTISONE SODIUM SUCCINATE 100 MG/2ML
100 INJECTION, POWDER, FOR SOLUTION INTRAMUSCULAR; INTRAVENOUS AS NEEDED
Status: CANCELLED | OUTPATIENT
Start: 2019-07-17

## 2019-06-19 RX ORDER — ACETAMINOPHEN 325 MG/1
650 TABLET ORAL AS NEEDED
Status: CANCELLED
Start: 2019-06-25

## 2019-06-19 RX ORDER — SODIUM CHLORIDE 9 MG/ML
10 INJECTION INTRAMUSCULAR; INTRAVENOUS; SUBCUTANEOUS AS NEEDED
Status: CANCELLED | OUTPATIENT
Start: 2019-06-27

## 2019-06-19 RX ORDER — ACETAMINOPHEN 325 MG/1
650 TABLET ORAL AS NEEDED
Status: CANCELLED
Start: 2019-07-19

## 2019-06-19 RX ORDER — SODIUM CHLORIDE 9 MG/ML
10 INJECTION INTRAMUSCULAR; INTRAVENOUS; SUBCUTANEOUS AS NEEDED
Status: CANCELLED | OUTPATIENT
Start: 2019-06-26

## 2019-06-19 RX ORDER — EPINEPHRINE 1 MG/ML
0.3 INJECTION, SOLUTION, CONCENTRATE INTRAVENOUS AS NEEDED
Status: CANCELLED | OUTPATIENT
Start: 2019-06-26

## 2019-06-19 RX ORDER — ONDANSETRON 2 MG/ML
8 INJECTION INTRAMUSCULAR; INTRAVENOUS AS NEEDED
Status: CANCELLED | OUTPATIENT
Start: 2019-07-01

## 2019-06-19 RX ORDER — HYDROCORTISONE SODIUM SUCCINATE 100 MG/2ML
100 INJECTION, POWDER, FOR SOLUTION INTRAMUSCULAR; INTRAVENOUS AS NEEDED
Status: CANCELLED | OUTPATIENT
Start: 2019-07-01

## 2019-06-19 RX ORDER — SODIUM CHLORIDE 0.9 % (FLUSH) 0.9 %
10 SYRINGE (ML) INJECTION AS NEEDED
Status: CANCELLED
Start: 2019-07-19

## 2019-06-19 RX ORDER — ACETAMINOPHEN 325 MG/1
650 TABLET ORAL AS NEEDED
Status: CANCELLED
Start: 2019-06-28

## 2019-06-19 RX ORDER — SODIUM CHLORIDE 9 MG/ML
10 INJECTION INTRAMUSCULAR; INTRAVENOUS; SUBCUTANEOUS AS NEEDED
Status: CANCELLED | OUTPATIENT
Start: 2019-07-29

## 2019-06-19 RX ORDER — SODIUM CHLORIDE 0.9 % (FLUSH) 0.9 %
10 SYRINGE (ML) INJECTION AS NEEDED
Status: CANCELLED
Start: 2019-07-22

## 2019-06-19 RX ORDER — SODIUM CHLORIDE 9 MG/ML
10 INJECTION INTRAMUSCULAR; INTRAVENOUS; SUBCUTANEOUS AS NEEDED
Status: CANCELLED | OUTPATIENT
Start: 2019-07-16

## 2019-06-19 RX ORDER — DIPHENHYDRAMINE HYDROCHLORIDE 50 MG/ML
25 INJECTION, SOLUTION INTRAMUSCULAR; INTRAVENOUS ONCE
Status: CANCELLED
Start: 2019-07-22

## 2019-06-19 RX ORDER — ONDANSETRON 2 MG/ML
8 INJECTION INTRAMUSCULAR; INTRAVENOUS AS NEEDED
Status: CANCELLED | OUTPATIENT
Start: 2019-06-28

## 2019-06-19 RX ORDER — ALBUTEROL SULFATE 0.83 MG/ML
2.5 SOLUTION RESPIRATORY (INHALATION) AS NEEDED
Status: CANCELLED
Start: 2019-07-15

## 2019-06-19 RX ORDER — ONDANSETRON 2 MG/ML
8 INJECTION INTRAMUSCULAR; INTRAVENOUS AS NEEDED
Status: CANCELLED | OUTPATIENT
Start: 2019-06-25

## 2019-06-19 RX ORDER — SODIUM CHLORIDE 0.9 % (FLUSH) 0.9 %
10 SYRINGE (ML) INJECTION AS NEEDED
Status: CANCELLED
Start: 2019-07-29

## 2019-06-19 RX ORDER — DIPHENHYDRAMINE HYDROCHLORIDE 50 MG/ML
50 INJECTION, SOLUTION INTRAMUSCULAR; INTRAVENOUS AS NEEDED
Status: CANCELLED
Start: 2019-06-26

## 2019-06-19 RX ORDER — ACETAMINOPHEN 325 MG/1
650 TABLET ORAL AS NEEDED
Status: CANCELLED
Start: 2019-07-17

## 2019-06-19 RX ORDER — SODIUM CHLORIDE 9 MG/ML
25 INJECTION, SOLUTION INTRAVENOUS CONTINUOUS
Status: CANCELLED | OUTPATIENT
Start: 2019-07-18

## 2019-06-19 RX ORDER — ACETAMINOPHEN 325 MG/1
650 TABLET ORAL ONCE
Status: CANCELLED
Start: 2019-07-01

## 2019-06-19 RX ORDER — HEPARIN 100 UNIT/ML
300-500 SYRINGE INTRAVENOUS AS NEEDED
Status: CANCELLED
Start: 2019-06-27

## 2019-06-19 RX ORDER — ACETAMINOPHEN 325 MG/1
650 TABLET ORAL AS NEEDED
Status: CANCELLED
Start: 2019-07-01

## 2019-06-19 RX ORDER — SODIUM CHLORIDE 0.9 % (FLUSH) 0.9 %
10 SYRINGE (ML) INJECTION AS NEEDED
Status: CANCELLED
Start: 2019-07-18

## 2019-06-19 RX ORDER — ONDANSETRON 2 MG/ML
8 INJECTION INTRAMUSCULAR; INTRAVENOUS ONCE
Status: CANCELLED | OUTPATIENT
Start: 2019-07-18

## 2019-06-19 RX ORDER — SODIUM CHLORIDE 9 MG/ML
10 INJECTION INTRAMUSCULAR; INTRAVENOUS; SUBCUTANEOUS AS NEEDED
Status: CANCELLED | OUTPATIENT
Start: 2019-07-17

## 2019-06-19 RX ORDER — DIPHENHYDRAMINE HYDROCHLORIDE 50 MG/ML
50 INJECTION, SOLUTION INTRAMUSCULAR; INTRAVENOUS AS NEEDED
Status: CANCELLED
Start: 2019-07-29

## 2019-06-19 RX ORDER — HEPARIN 100 UNIT/ML
300-500 SYRINGE INTRAVENOUS AS NEEDED
Status: CANCELLED
Start: 2019-07-08

## 2019-06-19 RX ORDER — HEPARIN 100 UNIT/ML
300-500 SYRINGE INTRAVENOUS AS NEEDED
Status: CANCELLED
Start: 2019-06-24

## 2019-06-19 RX ORDER — ALBUTEROL SULFATE 0.83 MG/ML
2.5 SOLUTION RESPIRATORY (INHALATION) AS NEEDED
Status: CANCELLED
Start: 2019-07-29

## 2019-06-19 RX ORDER — SODIUM CHLORIDE 9 MG/ML
25 INJECTION, SOLUTION INTRAVENOUS CONTINUOUS
Status: CANCELLED
Start: 2019-07-01

## 2019-06-19 RX ORDER — ONDANSETRON 2 MG/ML
8 INJECTION INTRAMUSCULAR; INTRAVENOUS AS NEEDED
Status: CANCELLED | OUTPATIENT
Start: 2019-06-26

## 2019-06-19 RX ORDER — SODIUM CHLORIDE 0.9 % (FLUSH) 0.9 %
10 SYRINGE (ML) INJECTION AS NEEDED
Status: CANCELLED
Start: 2019-06-25

## 2019-06-19 RX ORDER — SODIUM CHLORIDE 9 MG/ML
25 INJECTION, SOLUTION INTRAVENOUS CONTINUOUS
Status: CANCELLED | OUTPATIENT
Start: 2019-07-15

## 2019-06-19 RX ORDER — SODIUM CHLORIDE 9 MG/ML
25 INJECTION, SOLUTION INTRAVENOUS CONTINUOUS
Status: CANCELLED
Start: 2019-07-08

## 2019-06-19 RX ORDER — ACETAMINOPHEN 325 MG/1
650 TABLET ORAL AS NEEDED
Status: CANCELLED
Start: 2019-07-08

## 2019-06-19 RX ORDER — EPINEPHRINE 1 MG/ML
0.3 INJECTION, SOLUTION, CONCENTRATE INTRAVENOUS AS NEEDED
Status: CANCELLED | OUTPATIENT
Start: 2019-07-01

## 2019-06-19 RX ORDER — ONDANSETRON 2 MG/ML
8 INJECTION INTRAMUSCULAR; INTRAVENOUS AS NEEDED
Status: CANCELLED | OUTPATIENT
Start: 2019-07-17

## 2019-06-19 RX ORDER — HEPARIN 100 UNIT/ML
300-500 SYRINGE INTRAVENOUS AS NEEDED
Status: CANCELLED
Start: 2019-07-16

## 2019-06-19 RX ORDER — ACETAMINOPHEN 325 MG/1
650 TABLET ORAL AS NEEDED
Status: CANCELLED
Start: 2019-07-18

## 2019-06-19 RX ORDER — HEPARIN 100 UNIT/ML
300-500 SYRINGE INTRAVENOUS AS NEEDED
Status: CANCELLED
Start: 2019-07-18

## 2019-06-19 RX ORDER — ACETAMINOPHEN 325 MG/1
650 TABLET ORAL AS NEEDED
Status: CANCELLED
Start: 2019-06-27

## 2019-06-19 RX ORDER — PALONOSETRON 0.05 MG/ML
0.25 INJECTION, SOLUTION INTRAVENOUS ONCE
Status: CANCELLED
Start: 2019-06-28

## 2019-06-19 RX ORDER — DIPHENHYDRAMINE HYDROCHLORIDE 50 MG/ML
25 INJECTION, SOLUTION INTRAMUSCULAR; INTRAVENOUS ONCE
Status: CANCELLED
Start: 2019-07-15

## 2019-06-19 RX ORDER — ACETAMINOPHEN 325 MG/1
650 TABLET ORAL ONCE
Status: CANCELLED
Start: 2019-07-22

## 2019-06-19 RX ORDER — HYDROCORTISONE SODIUM SUCCINATE 100 MG/2ML
100 INJECTION, POWDER, FOR SOLUTION INTRAMUSCULAR; INTRAVENOUS AS NEEDED
Status: CANCELLED | OUTPATIENT
Start: 2019-07-19

## 2019-06-19 RX ORDER — HEPARIN 100 UNIT/ML
300-500 SYRINGE INTRAVENOUS AS NEEDED
Status: CANCELLED
Start: 2019-07-29

## 2019-06-19 RX ORDER — EPINEPHRINE 1 MG/ML
0.3 INJECTION, SOLUTION, CONCENTRATE INTRAVENOUS AS NEEDED
Status: CANCELLED | OUTPATIENT
Start: 2019-06-27

## 2019-06-19 RX ORDER — ONDANSETRON 2 MG/ML
8 INJECTION INTRAMUSCULAR; INTRAVENOUS ONCE
Status: CANCELLED | OUTPATIENT
Start: 2019-07-15

## 2019-06-19 RX ORDER — EPINEPHRINE 1 MG/ML
0.3 INJECTION, SOLUTION, CONCENTRATE INTRAVENOUS AS NEEDED
Status: CANCELLED | OUTPATIENT
Start: 2019-07-17

## 2019-06-19 RX ORDER — DIPHENHYDRAMINE HYDROCHLORIDE 50 MG/ML
25 INJECTION, SOLUTION INTRAMUSCULAR; INTRAVENOUS ONCE
Status: CANCELLED
Start: 2019-06-24

## 2019-06-19 RX ORDER — SODIUM CHLORIDE 0.9 % (FLUSH) 0.9 %
10 SYRINGE (ML) INJECTION AS NEEDED
Status: CANCELLED
Start: 2019-06-24

## 2019-06-19 RX ORDER — HYDROCORTISONE SODIUM SUCCINATE 100 MG/2ML
100 INJECTION, POWDER, FOR SOLUTION INTRAMUSCULAR; INTRAVENOUS AS NEEDED
Status: CANCELLED | OUTPATIENT
Start: 2019-07-22

## 2019-06-19 RX ORDER — EPINEPHRINE 1 MG/ML
0.3 INJECTION, SOLUTION, CONCENTRATE INTRAVENOUS AS NEEDED
Status: CANCELLED | OUTPATIENT
Start: 2019-07-29

## 2019-06-19 RX ORDER — EPINEPHRINE 1 MG/ML
0.3 INJECTION, SOLUTION, CONCENTRATE INTRAVENOUS AS NEEDED
Status: CANCELLED | OUTPATIENT
Start: 2019-07-08

## 2019-06-19 RX ORDER — HEPARIN 100 UNIT/ML
300-500 SYRINGE INTRAVENOUS AS NEEDED
Status: CANCELLED
Start: 2019-07-15

## 2019-06-19 RX ORDER — EPINEPHRINE 1 MG/ML
0.3 INJECTION, SOLUTION, CONCENTRATE INTRAVENOUS AS NEEDED
Status: CANCELLED | OUTPATIENT
Start: 2019-07-18

## 2019-06-19 RX ORDER — HEPARIN 100 UNIT/ML
300-500 SYRINGE INTRAVENOUS AS NEEDED
Status: CANCELLED
Start: 2019-07-22

## 2019-06-19 RX ORDER — EPINEPHRINE 1 MG/ML
0.3 INJECTION, SOLUTION, CONCENTRATE INTRAVENOUS AS NEEDED
Status: CANCELLED | OUTPATIENT
Start: 2019-07-19

## 2019-06-19 RX ORDER — ALBUTEROL SULFATE 0.83 MG/ML
2.5 SOLUTION RESPIRATORY (INHALATION) AS NEEDED
Status: CANCELLED
Start: 2019-07-08

## 2019-06-19 RX ORDER — ACETAMINOPHEN 325 MG/1
650 TABLET ORAL AS NEEDED
Status: CANCELLED
Start: 2019-07-15

## 2019-06-19 RX ORDER — ALBUTEROL SULFATE 0.83 MG/ML
2.5 SOLUTION RESPIRATORY (INHALATION) AS NEEDED
Status: CANCELLED
Start: 2019-07-16

## 2019-06-20 ENCOUNTER — HOSPITAL ENCOUNTER (OUTPATIENT)
Dept: INFUSION THERAPY | Age: 34
Discharge: HOME | End: 2019-06-20
Payer: COMMERCIAL

## 2019-06-20 VITALS
DIASTOLIC BLOOD PRESSURE: 88 MMHG | SYSTOLIC BLOOD PRESSURE: 131 MMHG | TEMPERATURE: 98.6 F | HEART RATE: 102 BPM | OXYGEN SATURATION: 98 % | RESPIRATION RATE: 18 BRPM

## 2019-06-20 DIAGNOSIS — C62.11 MALIGNANT NEOPLASM OF DESCENDED RIGHT TESTIS (HCC): Primary | ICD-10-CM

## 2019-06-20 PROCEDURE — 77030012965 HC NDL HUBR BBMI -A

## 2019-06-20 PROCEDURE — 74011250636 HC RX REV CODE- 250/636: Performed by: NURSE PRACTITIONER

## 2019-06-20 PROCEDURE — 96360 HYDRATION IV INFUSION INIT: CPT

## 2019-06-20 RX ADMIN — SODIUM CHLORIDE 1000 ML: 900 INJECTION, SOLUTION INTRAVENOUS at 16:08

## 2019-06-24 ENCOUNTER — HOSPITAL ENCOUNTER (OUTPATIENT)
Dept: INFUSION THERAPY | Age: 34
Discharge: HOME | End: 2019-06-24
Payer: COMMERCIAL

## 2019-06-24 VITALS
SYSTOLIC BLOOD PRESSURE: 122 MMHG | RESPIRATION RATE: 18 BRPM | OXYGEN SATURATION: 98 % | DIASTOLIC BLOOD PRESSURE: 81 MMHG | TEMPERATURE: 98.2 F | WEIGHT: 230 LBS | BODY MASS INDEX: 34.86 KG/M2 | HEIGHT: 68 IN | HEART RATE: 55 BPM

## 2019-06-24 DIAGNOSIS — C62.10 MALIGNANT NEOPLASM OF DESCENDED TESTIS, UNSPECIFIED LATERALITY (HCC): Primary | ICD-10-CM

## 2019-06-24 LAB
ALBUMIN SERPL-MCNC: 3.5 G/DL (ref 3.5–5)
ALBUMIN/GLOB SERPL: 1 {RATIO} (ref 1.1–2.2)
ALP SERPL-CCNC: 65 U/L (ref 45–117)
ALT SERPL-CCNC: 32 U/L (ref 12–78)
ANION GAP SERPL CALC-SCNC: 6 MMOL/L (ref 5–15)
AST SERPL-CCNC: 13 U/L (ref 15–37)
BASOPHILS # BLD: 0 K/UL (ref 0–0.1)
BASOPHILS NFR BLD: 0 % (ref 0–1)
BILIRUB SERPL-MCNC: 0.4 MG/DL (ref 0.2–1)
BUN SERPL-MCNC: 10 MG/DL (ref 6–20)
BUN/CREAT SERPL: 11 (ref 12–20)
CALCIUM SERPL-MCNC: 8.9 MG/DL (ref 8.5–10.1)
CHLORIDE SERPL-SCNC: 106 MMOL/L (ref 97–108)
CO2 SERPL-SCNC: 29 MMOL/L (ref 21–32)
CREAT SERPL-MCNC: 0.94 MG/DL (ref 0.7–1.3)
DIFFERENTIAL METHOD BLD: ABNORMAL
EOSINOPHIL # BLD: 0 K/UL (ref 0–0.4)
EOSINOPHIL NFR BLD: 1 % (ref 0–7)
ERYTHROCYTE [DISTWIDTH] IN BLOOD BY AUTOMATED COUNT: 14.6 % (ref 11.5–14.5)
GLOBULIN SER CALC-MCNC: 3.4 G/DL (ref 2–4)
GLUCOSE SERPL-MCNC: 97 MG/DL (ref 65–100)
HCT VFR BLD AUTO: 35 % (ref 36.6–50.3)
HGB BLD-MCNC: 12.4 G/DL (ref 12.1–17)
IMM GRANULOCYTES # BLD AUTO: 0 K/UL (ref 0–0.04)
IMM GRANULOCYTES NFR BLD AUTO: 0 % (ref 0–0.5)
LYMPHOCYTES # BLD: 1.3 K/UL (ref 0.8–3.5)
LYMPHOCYTES NFR BLD: 28 % (ref 12–49)
MAGNESIUM SERPL-MCNC: 2 MG/DL (ref 1.6–2.4)
MCH RBC QN AUTO: 31.6 PG (ref 26–34)
MCHC RBC AUTO-ENTMCNC: 35.4 G/DL (ref 30–36.5)
MCV RBC AUTO: 89.3 FL (ref 80–99)
METAMYELOCYTES NFR BLD MANUAL: 2 %
MONOCYTES # BLD: 0.7 K/UL (ref 0–1)
MONOCYTES NFR BLD: 14 % (ref 5–13)
NEUTS BAND NFR BLD MANUAL: 5 %
NEUTS SEG # BLD: 2.6 K/UL (ref 1.8–8)
NEUTS SEG NFR BLD: 50 % (ref 32–75)
NRBC # BLD: 0 K/UL (ref 0–0.01)
NRBC BLD-RTO: 0 PER 100 WBC
PLATELET # BLD AUTO: 389 K/UL (ref 150–400)
PMV BLD AUTO: 8.7 FL (ref 8.9–12.9)
POTASSIUM SERPL-SCNC: 3.9 MMOL/L (ref 3.5–5.1)
PROT SERPL-MCNC: 6.9 G/DL (ref 6.4–8.2)
RBC # BLD AUTO: 3.92 M/UL (ref 4.1–5.7)
RBC MORPH BLD: ABNORMAL
RBC MORPH BLD: ABNORMAL
SODIUM SERPL-SCNC: 141 MMOL/L (ref 136–145)
WBC # BLD AUTO: 4.8 K/UL (ref 4.1–11.1)

## 2019-06-24 PROCEDURE — 74011250636 HC RX REV CODE- 250/636: Performed by: INTERNAL MEDICINE

## 2019-06-24 PROCEDURE — 74011000258 HC RX REV CODE- 258: Performed by: INTERNAL MEDICINE

## 2019-06-24 PROCEDURE — 82105 ALPHA-FETOPROTEIN SERUM: CPT

## 2019-06-24 PROCEDURE — 85025 COMPLETE CBC W/AUTO DIFF WBC: CPT

## 2019-06-24 PROCEDURE — 74011250637 HC RX REV CODE- 250/637: Performed by: INTERNAL MEDICINE

## 2019-06-24 PROCEDURE — 36415 COLL VENOUS BLD VENIPUNCTURE: CPT

## 2019-06-24 PROCEDURE — 80053 COMPREHEN METABOLIC PANEL: CPT

## 2019-06-24 PROCEDURE — 96367 TX/PROPH/DG ADDL SEQ IV INF: CPT

## 2019-06-24 PROCEDURE — 96413 CHEMO IV INFUSION 1 HR: CPT

## 2019-06-24 PROCEDURE — 96375 TX/PRO/DX INJ NEW DRUG ADDON: CPT

## 2019-06-24 PROCEDURE — 96417 CHEMO IV INFUS EACH ADDL SEQ: CPT

## 2019-06-24 PROCEDURE — 83735 ASSAY OF MAGNESIUM: CPT

## 2019-06-24 PROCEDURE — 77030012965 HC NDL HUBR BBMI -A

## 2019-06-24 PROCEDURE — 96411 CHEMO IV PUSH ADDL DRUG: CPT

## 2019-06-24 RX ORDER — DIPHENHYDRAMINE HYDROCHLORIDE 50 MG/ML
25 INJECTION, SOLUTION INTRAMUSCULAR; INTRAVENOUS ONCE
Status: DISCONTINUED | OUTPATIENT
Start: 2019-06-24 | End: 2019-06-24

## 2019-06-24 RX ORDER — SODIUM CHLORIDE 9 MG/ML
25 INJECTION, SOLUTION INTRAVENOUS CONTINUOUS
Status: DISPENSED | OUTPATIENT
Start: 2019-06-24 | End: 2019-06-24

## 2019-06-24 RX ORDER — HEPARIN 100 UNIT/ML
300-500 SYRINGE INTRAVENOUS AS NEEDED
Status: ACTIVE | OUTPATIENT
Start: 2019-06-24 | End: 2019-06-24

## 2019-06-24 RX ORDER — DIPHENHYDRAMINE HCL 25 MG
25 CAPSULE ORAL ONCE
Status: COMPLETED | OUTPATIENT
Start: 2019-06-24 | End: 2019-06-24

## 2019-06-24 RX ORDER — SODIUM CHLORIDE 9 MG/ML
10 INJECTION INTRAMUSCULAR; INTRAVENOUS; SUBCUTANEOUS AS NEEDED
Status: ACTIVE | OUTPATIENT
Start: 2019-06-24 | End: 2019-06-24

## 2019-06-24 RX ORDER — SODIUM CHLORIDE 0.9 % (FLUSH) 0.9 %
10 SYRINGE (ML) INJECTION AS NEEDED
Status: ACTIVE | OUTPATIENT
Start: 2019-06-24 | End: 2019-06-24

## 2019-06-24 RX ORDER — ACETAMINOPHEN 325 MG/1
650 TABLET ORAL ONCE
Status: COMPLETED | OUTPATIENT
Start: 2019-06-24 | End: 2019-06-24

## 2019-06-24 RX ORDER — ONDANSETRON 2 MG/ML
8 INJECTION INTRAMUSCULAR; INTRAVENOUS ONCE
Status: COMPLETED | OUTPATIENT
Start: 2019-06-24 | End: 2019-06-24

## 2019-06-24 RX ADMIN — ETOPOSIDE 240 MG: 20 INJECTION INTRAVENOUS at 14:41

## 2019-06-24 RX ADMIN — BLEOMYCIN SULFATE 30 UNITS: 30 INJECTION, POWDER, LYOPHILIZED, FOR SOLUTION INTRAMUSCULAR; INTRAPLEURAL; INTRAVENOUS; SUBCUTANEOUS at 13:28

## 2019-06-24 RX ADMIN — Medication 500 UNITS: at 15:51

## 2019-06-24 RX ADMIN — POTASSIUM CHLORIDE: 2 INJECTION, SOLUTION, CONCENTRATE INTRAVENOUS at 10:19

## 2019-06-24 RX ADMIN — DIPHENHYDRAMINE HYDROCHLORIDE 25 MG: 25 CAPSULE ORAL at 12:00

## 2019-06-24 RX ADMIN — DEXAMETHASONE SODIUM PHOSPHATE 12 MG: 4 INJECTION, SOLUTION INTRAMUSCULAR; INTRAVENOUS at 13:06

## 2019-06-24 RX ADMIN — ACETAMINOPHEN 650 MG: 325 TABLET ORAL at 12:17

## 2019-06-24 RX ADMIN — Medication 10 ML: at 15:51

## 2019-06-24 RX ADMIN — SODIUM CHLORIDE 25 ML/HR: 900 INJECTION, SOLUTION INTRAVENOUS at 12:00

## 2019-06-24 RX ADMIN — CISPLATIN 48 MG: 1 INJECTION, SOLUTION INTRAVENOUS at 13:45

## 2019-06-24 RX ADMIN — ONDANSETRON 8 MG: 2 INJECTION, SOLUTION INTRAMUSCULAR; INTRAVENOUS at 12:27

## 2019-06-24 RX ADMIN — SODIUM CHLORIDE 150 MG: 900 INJECTION, SOLUTION INTRAVENOUS at 12:43

## 2019-06-24 NOTE — PROGRESS NOTES
[facility] Note:    1000 Pt arrived at Health system ambulatory and in no distress for C3D1 etoposide/ cisplatin/ bleomycin. Assessment completed, no new complaints voiced. Pt reports he's been using phenergan for nausea with some relief. Reports occasional vomiting in the mornings before he eats. Port accessed, labs drawn. Medications received:  NS 1000 ml with 10 meq kcl and magnesium 2 grams IV over 1 hour  NS @ KVO  zofran 8 mg IV  Tylenol 650 mg PO  Benadryl 25 mg PO  Emend 150 mg IV over 20 min  Dexamethasone 12 mg IV  Bleomycin 30 units IV over 10 min  Cisplatin 48 mg IV over 1 hour  Etoposide 240 mg IV over 1 hour    Port flushed with NS and heparin and needle removed. Patient Vitals for the past 8 hrs:   Temp Pulse Resp BP SpO2   06/24/19 1548 -- (!) 55 18 122/81 --   06/24/19 1005 98.2 °F (36.8 °C) 84 18 131/85 98 %         1550 Tolerated treatment well, no adverse reaction noted. D/Cd from Health system ambulatory and in no distress accompanied by girlfriend. Next appt tomorrow    Recent Results (from the past 8 hour(s))   CBC WITH AUTOMATED DIFF    Collection Time: 06/24/19 10:12 AM   Result Value Ref Range    WBC 4.8 4.1 - 11.1 K/uL    RBC 3.92 (L) 4.10 - 5.70 M/uL    HGB 12.4 12.1 - 17.0 g/dL    HCT 35.0 (L) 36.6 - 50.3 %    MCV 89.3 80.0 - 99.0 FL    MCH 31.6 26.0 - 34.0 PG    MCHC 35.4 30.0 - 36.5 g/dL    RDW 14.6 (H) 11.5 - 14.5 %    PLATELET 814 892 - 420 K/uL    MPV 8.7 (L) 8.9 - 12.9 FL    NRBC 0.0 0  WBC    ABSOLUTE NRBC 0.00 0.00 - 0.01 K/uL    NEUTROPHILS 50 32 - 75 %    BAND NEUTROPHILS 5 %    LYMPHOCYTES 28 12 - 49 %    MONOCYTES 14 (H) 5 - 13 %    EOSINOPHILS 1 0 - 7 %    BASOPHILS 0 0 - 1 %    METAMYELOCYTES 2 %    IMMATURE GRANULOCYTES 0 0.0 - 0.5 %    ABS. NEUTROPHILS 2.6 1.8 - 8.0 K/UL    ABS. LYMPHOCYTES 1.3 0.8 - 3.5 K/UL    ABS. MONOCYTES 0.7 0.0 - 1.0 K/UL    ABS. EOSINOPHILS 0.0 0.0 - 0.4 K/UL    ABS. BASOPHILS 0.0 0.0 - 0.1 K/UL    ABS. IMM.  GRANS. 0.0 0.00 - 0.04 K/UL    DF MANUAL      RBC COMMENTS POLYCHROMASIA  PRESENT        RBC COMMENTS ANISOCYTOSIS  PRESENT       METABOLIC PANEL, COMPREHENSIVE    Collection Time: 06/24/19 10:12 AM   Result Value Ref Range    Sodium 141 136 - 145 mmol/L    Potassium 3.9 3.5 - 5.1 mmol/L    Chloride 106 97 - 108 mmol/L    CO2 29 21 - 32 mmol/L    Anion gap 6 5 - 15 mmol/L    Glucose 97 65 - 100 mg/dL    BUN 10 6 - 20 MG/DL    Creatinine 0.94 0.70 - 1.30 MG/DL    BUN/Creatinine ratio 11 (L) 12 - 20      GFR est AA >60 >60 ml/min/1.73m2    GFR est non-AA >60 >60 ml/min/1.73m2    Calcium 8.9 8.5 - 10.1 MG/DL    Bilirubin, total 0.4 0.2 - 1.0 MG/DL    ALT (SGPT) 32 12 - 78 U/L    AST (SGOT) 13 (L) 15 - 37 U/L    Alk.  phosphatase 65 45 - 117 U/L    Protein, total 6.9 6.4 - 8.2 g/dL    Albumin 3.5 3.5 - 5.0 g/dL    Globulin 3.4 2.0 - 4.0 g/dL    A-G Ratio 1.0 (L) 1.1 - 2.2     MAGNESIUM    Collection Time: 06/24/19 10:12 AM   Result Value Ref Range    Magnesium 2.0 1.6 - 2.4 mg/dL

## 2019-06-25 ENCOUNTER — HOSPITAL ENCOUNTER (OUTPATIENT)
Dept: INFUSION THERAPY | Age: 34
Discharge: HOME | End: 2019-06-25
Payer: COMMERCIAL

## 2019-06-25 VITALS
HEIGHT: 68 IN | HEART RATE: 56 BPM | BODY MASS INDEX: 35.51 KG/M2 | TEMPERATURE: 98.3 F | WEIGHT: 234.3 LBS | OXYGEN SATURATION: 98 % | DIASTOLIC BLOOD PRESSURE: 83 MMHG | RESPIRATION RATE: 18 BRPM | SYSTOLIC BLOOD PRESSURE: 132 MMHG

## 2019-06-25 DIAGNOSIS — C62.10 MALIGNANT NEOPLASM OF DESCENDED TESTIS, UNSPECIFIED LATERALITY (HCC): Primary | ICD-10-CM

## 2019-06-25 LAB — AFP-TM SERPL-MCNC: 3.5 NG/ML (ref 0–8.3)

## 2019-06-25 PROCEDURE — 96413 CHEMO IV INFUSION 1 HR: CPT

## 2019-06-25 PROCEDURE — 74011000258 HC RX REV CODE- 258: Performed by: INTERNAL MEDICINE

## 2019-06-25 PROCEDURE — 74011250636 HC RX REV CODE- 250/636: Performed by: INTERNAL MEDICINE

## 2019-06-25 PROCEDURE — 96417 CHEMO IV INFUS EACH ADDL SEQ: CPT

## 2019-06-25 PROCEDURE — 96375 TX/PRO/DX INJ NEW DRUG ADDON: CPT

## 2019-06-25 PROCEDURE — 77030012965 HC NDL HUBR BBMI -A

## 2019-06-25 PROCEDURE — 96367 TX/PROPH/DG ADDL SEQ IV INF: CPT

## 2019-06-25 RX ORDER — SODIUM CHLORIDE 0.9 % (FLUSH) 0.9 %
10 SYRINGE (ML) INJECTION AS NEEDED
Status: ACTIVE | OUTPATIENT
Start: 2019-06-25 | End: 2019-06-25

## 2019-06-25 RX ORDER — SODIUM CHLORIDE 9 MG/ML
25 INJECTION, SOLUTION INTRAVENOUS CONTINUOUS
Status: DISPENSED | OUTPATIENT
Start: 2019-06-25 | End: 2019-06-25

## 2019-06-25 RX ORDER — ONDANSETRON 2 MG/ML
8 INJECTION INTRAMUSCULAR; INTRAVENOUS ONCE
Status: COMPLETED | OUTPATIENT
Start: 2019-06-25 | End: 2019-06-25

## 2019-06-25 RX ORDER — HEPARIN 100 UNIT/ML
300-500 SYRINGE INTRAVENOUS AS NEEDED
Status: ACTIVE | OUTPATIENT
Start: 2019-06-25 | End: 2019-06-25

## 2019-06-25 RX ORDER — SODIUM CHLORIDE 9 MG/ML
10 INJECTION INTRAMUSCULAR; INTRAVENOUS; SUBCUTANEOUS AS NEEDED
Status: ACTIVE | OUTPATIENT
Start: 2019-06-25 | End: 2019-06-25

## 2019-06-25 RX ADMIN — ETOPOSIDE 240 MG: 20 INJECTION, SOLUTION INTRAVENOUS at 14:22

## 2019-06-25 RX ADMIN — CISPLATIN 48 MG: 1 INJECTION INTRAVENOUS at 13:08

## 2019-06-25 RX ADMIN — POTASSIUM CHLORIDE: 2 INJECTION, SOLUTION, CONCENTRATE INTRAVENOUS at 11:22

## 2019-06-25 RX ADMIN — Medication 500 UNITS: at 15:35

## 2019-06-25 RX ADMIN — DEXAMETHASONE SODIUM PHOSPHATE 12 MG: 4 INJECTION, SOLUTION INTRAMUSCULAR; INTRAVENOUS at 12:29

## 2019-06-25 RX ADMIN — ONDANSETRON 8 MG: 2 INJECTION, SOLUTION INTRAMUSCULAR; INTRAVENOUS at 11:19

## 2019-06-25 RX ADMIN — SODIUM CHLORIDE 25 ML/HR: 900 INJECTION, SOLUTION INTRAVENOUS at 11:04

## 2019-06-25 NOTE — PROGRESS NOTES
1055Pt arrived at [facility] ambulatory and in no distress for C3D2. Assessment completed, no new complaints voiced. Pt reports good relief with Phenergan. Port accessed per protocol with positive blood return. Patient Vitals for the past 12 hrs:   Temp Pulse Resp BP SpO2   06/25/19 1535 -- (!) 56 -- 132/83 --   06/25/19 1057 98.3 °F (36.8 °C) 88 18 121/75 98 %       Medications received:  1 liter NS with potassium 10 meq and Mag 2 gm IV  Zofran  Decadron  Cisplatin  Etoposide    1535 Tolerated treatment well, no adverse reaction noted. Port flushed and de-accessed. D/Cd from [facility] ambulatory and in no distress accompanied by friend. Next appt 6/26.

## 2019-06-26 ENCOUNTER — HOSPITAL ENCOUNTER (OUTPATIENT)
Dept: INFUSION THERAPY | Age: 34
Discharge: HOME | End: 2019-06-26
Payer: COMMERCIAL

## 2019-06-26 VITALS
HEART RATE: 54 BPM | BODY MASS INDEX: 36.36 KG/M2 | WEIGHT: 239.9 LBS | SYSTOLIC BLOOD PRESSURE: 138 MMHG | RESPIRATION RATE: 16 BRPM | TEMPERATURE: 98.2 F | DIASTOLIC BLOOD PRESSURE: 88 MMHG | OXYGEN SATURATION: 99 % | HEIGHT: 68 IN

## 2019-06-26 DIAGNOSIS — C62.10 MALIGNANT NEOPLASM OF DESCENDED TESTIS, UNSPECIFIED LATERALITY (HCC): Primary | ICD-10-CM

## 2019-06-26 PROCEDURE — 96361 HYDRATE IV INFUSION ADD-ON: CPT

## 2019-06-26 PROCEDURE — 74011000258 HC RX REV CODE- 258: Performed by: INTERNAL MEDICINE

## 2019-06-26 PROCEDURE — 74011250636 HC RX REV CODE- 250/636: Performed by: INTERNAL MEDICINE

## 2019-06-26 PROCEDURE — 96413 CHEMO IV INFUSION 1 HR: CPT

## 2019-06-26 PROCEDURE — 96375 TX/PRO/DX INJ NEW DRUG ADDON: CPT

## 2019-06-26 PROCEDURE — 96417 CHEMO IV INFUS EACH ADDL SEQ: CPT

## 2019-06-26 PROCEDURE — 77030012965 HC NDL HUBR BBMI -A

## 2019-06-26 RX ORDER — ONDANSETRON 2 MG/ML
8 INJECTION INTRAMUSCULAR; INTRAVENOUS ONCE
Status: COMPLETED | OUTPATIENT
Start: 2019-06-26 | End: 2019-06-26

## 2019-06-26 RX ORDER — SODIUM CHLORIDE 9 MG/ML
10 INJECTION INTRAMUSCULAR; INTRAVENOUS; SUBCUTANEOUS AS NEEDED
Status: ACTIVE | OUTPATIENT
Start: 2019-06-26 | End: 2019-06-26

## 2019-06-26 RX ORDER — SODIUM CHLORIDE 0.9 % (FLUSH) 0.9 %
10 SYRINGE (ML) INJECTION AS NEEDED
Status: ACTIVE | OUTPATIENT
Start: 2019-06-26 | End: 2019-06-26

## 2019-06-26 RX ORDER — SODIUM CHLORIDE 9 MG/ML
25 INJECTION, SOLUTION INTRAVENOUS CONTINUOUS
Status: DISPENSED | OUTPATIENT
Start: 2019-06-26 | End: 2019-06-26

## 2019-06-26 RX ORDER — HEPARIN 100 UNIT/ML
300-500 SYRINGE INTRAVENOUS AS NEEDED
Status: ACTIVE | OUTPATIENT
Start: 2019-06-26 | End: 2019-06-26

## 2019-06-26 RX ADMIN — ETOPOSIDE 240 MG: 20 INJECTION, SOLUTION, CONCENTRATE INTRAVENOUS at 14:00

## 2019-06-26 RX ADMIN — Medication 500 UNITS: at 15:10

## 2019-06-26 RX ADMIN — CISPLATIN 48 MG: 1 INJECTION, SOLUTION INTRAVENOUS at 12:55

## 2019-06-26 RX ADMIN — Medication 10 ML: at 11:05

## 2019-06-26 RX ADMIN — DEXAMETHASONE SODIUM PHOSPHATE 12 MG: 4 INJECTION, SOLUTION INTRAMUSCULAR; INTRAVENOUS at 12:15

## 2019-06-26 RX ADMIN — Medication 10 ML: at 15:10

## 2019-06-26 RX ADMIN — POTASSIUM CHLORIDE: 2 INJECTION, SOLUTION, CONCENTRATE INTRAVENOUS at 11:06

## 2019-06-26 RX ADMIN — SODIUM CHLORIDE 25 ML/HR: 900 INJECTION, SOLUTION INTRAVENOUS at 12:09

## 2019-06-26 RX ADMIN — ONDANSETRON 8 MG: 2 INJECTION, SOLUTION INTRAMUSCULAR; INTRAVENOUS at 12:09

## 2019-06-26 RX ADMIN — SODIUM CHLORIDE 10 ML: 9 INJECTION INTRAMUSCULAR; INTRAVENOUS; SUBCUTANEOUS at 11:05

## 2019-06-26 NOTE — PROGRESS NOTES
Pt arrived to [facility] ambulatory in no acute distress at 1000 for BEP (Cisplatin/Etoposide) Assessment unremarkable except pt reports some nausea this morning, but took anti-emetics and was relieved. R chest port accessed without issue and positive blood return noted.  Labs obtained on D1 of cycle. Visit Vitals  /80 (BP 1 Location: Right arm, BP Patient Position: Sitting)   Pulse 61   Temp 98.2 °F (36.8 °C)   Resp 18   Ht 5' 8\" (1.727 m)   Wt 108.8 kg (239 lb 14.4 oz)   SpO2 99%   BMI 36.48 kg/m²     The following medications administered:  1 Liter NS Bolus w/ 20 mEq K+ and Mag 2 gram  Zofran IV  Decadron IV  Cisplatin IV  Etopside IV  NS Flush  Heparin Flush      Visit Vitals  /88 (BP 1 Location: Right arm, BP Patient Position: At rest;Sitting)   Pulse (!) 54   Temp 98.2 °F (36.8 °C)   Resp 16   Ht 5' 8\" (1.727 m)   Wt 108.8 kg (239 lb 14.4 oz)   SpO2 99%   BMI 36.48 kg/m²       Pt tolerated treatment well. No adverse reactions noted.  IV flushed per policy and removed, 2x2 and paper tape placed.  Pt discharged ambulatory in no acute distress at 1515, accompanied by family.   Next appointment 6/27/19 @ 10 am.

## 2019-06-27 ENCOUNTER — HOSPITAL ENCOUNTER (OUTPATIENT)
Dept: INFUSION THERAPY | Age: 34
Discharge: HOME | End: 2019-06-27
Payer: COMMERCIAL

## 2019-06-27 VITALS
HEART RATE: 58 BPM | OXYGEN SATURATION: 98 % | WEIGHT: 238 LBS | HEIGHT: 68 IN | RESPIRATION RATE: 16 BRPM | BODY MASS INDEX: 36.07 KG/M2 | DIASTOLIC BLOOD PRESSURE: 85 MMHG | SYSTOLIC BLOOD PRESSURE: 137 MMHG | TEMPERATURE: 98 F

## 2019-06-27 DIAGNOSIS — C62.10 MALIGNANT NEOPLASM OF DESCENDED TESTIS, UNSPECIFIED LATERALITY (HCC): Primary | ICD-10-CM

## 2019-06-27 PROCEDURE — 77030012965 HC NDL HUBR BBMI -A

## 2019-06-27 PROCEDURE — 74011250636 HC RX REV CODE- 250/636: Performed by: INTERNAL MEDICINE

## 2019-06-27 PROCEDURE — 96375 TX/PRO/DX INJ NEW DRUG ADDON: CPT

## 2019-06-27 PROCEDURE — 96417 CHEMO IV INFUS EACH ADDL SEQ: CPT

## 2019-06-27 PROCEDURE — 96413 CHEMO IV INFUSION 1 HR: CPT

## 2019-06-27 PROCEDURE — 74011000258 HC RX REV CODE- 258: Performed by: INTERNAL MEDICINE

## 2019-06-27 PROCEDURE — 74011000250 HC RX REV CODE- 250: Performed by: INTERNAL MEDICINE

## 2019-06-27 PROCEDURE — 96361 HYDRATE IV INFUSION ADD-ON: CPT

## 2019-06-27 RX ORDER — SODIUM CHLORIDE 9 MG/ML
10 INJECTION INTRAMUSCULAR; INTRAVENOUS; SUBCUTANEOUS AS NEEDED
Status: ACTIVE | OUTPATIENT
Start: 2019-06-27 | End: 2019-06-27

## 2019-06-27 RX ORDER — SODIUM CHLORIDE 9 MG/ML
25 INJECTION, SOLUTION INTRAVENOUS CONTINUOUS
Status: DISPENSED | OUTPATIENT
Start: 2019-06-27 | End: 2019-06-27

## 2019-06-27 RX ORDER — SODIUM CHLORIDE 0.9 % (FLUSH) 0.9 %
10 SYRINGE (ML) INJECTION AS NEEDED
Status: ACTIVE | OUTPATIENT
Start: 2019-06-27 | End: 2019-06-27

## 2019-06-27 RX ORDER — HEPARIN 100 UNIT/ML
300-500 SYRINGE INTRAVENOUS AS NEEDED
Status: ACTIVE | OUTPATIENT
Start: 2019-06-27 | End: 2019-06-27

## 2019-06-27 RX ORDER — ONDANSETRON 2 MG/ML
8 INJECTION INTRAMUSCULAR; INTRAVENOUS ONCE
Status: COMPLETED | OUTPATIENT
Start: 2019-06-27 | End: 2019-06-27

## 2019-06-27 RX ADMIN — ONDANSETRON 8 MG: 2 INJECTION, SOLUTION INTRAMUSCULAR; INTRAVENOUS at 11:04

## 2019-06-27 RX ADMIN — POTASSIUM CHLORIDE: 2 INJECTION, SOLUTION, CONCENTRATE INTRAVENOUS at 10:00

## 2019-06-27 RX ADMIN — Medication 500 UNITS: at 13:42

## 2019-06-27 RX ADMIN — ETOPOSIDE 240 MG: 20 INJECTION, SOLUTION, CONCENTRATE INTRAVENOUS at 12:35

## 2019-06-27 RX ADMIN — SODIUM CHLORIDE 10 ML: 9 INJECTION INTRAMUSCULAR; INTRAVENOUS; SUBCUTANEOUS at 09:57

## 2019-06-27 RX ADMIN — Medication 10 ML: at 13:42

## 2019-06-27 RX ADMIN — DEXAMETHASONE SODIUM PHOSPHATE 12 MG: 4 INJECTION, SOLUTION INTRAMUSCULAR; INTRAVENOUS at 11:07

## 2019-06-27 RX ADMIN — SODIUM CHLORIDE 25 ML/HR: 900 INJECTION, SOLUTION INTRAVENOUS at 09:00

## 2019-06-27 RX ADMIN — Medication 10 ML: at 09:58

## 2019-06-27 RX ADMIN — CISPLATIN 48 MG: 1 INJECTION, SOLUTION INTRAVENOUS at 11:29

## 2019-06-27 NOTE — PROGRESS NOTES
[facility] Progress Note    Date: 2019    Name: González Isaac    MRN: [id number]         : 1985    Mr. Biju Mayberry arrived ambulatory at [phone] and in no distress for C3D4 BEP. Assessment was completed, no acute issues at this time, no new complaints voiced. Nausea remains persistent. RCW port accessed without difficulty with 0.75 lópez needle; + blood return noted, labs drawn and sent. Mr. Green's vitals were reviewed. Patient Vitals for the past 12 hrs:   Temp Pulse Resp BP SpO2   19 1342 -- (!) 58 16 137/85 --   19 0950 98 °F (36.7 °C) 61 16 124/87 98 %         Lab results were obtained and reviewed. Pre-medications  were administered as ordered and chemotherapy was initiated.      Medication:  Medications Administered     0.9% sodium chloride 1,000 mL with potassium chloride 10 mEq, magnesium sulfate 2 g infusion     Admin Date  2019 Action  Given Dose   Rate  1,000 mL/hr Route  IntraVENous Administered By  Vineete Benne A          0.9% sodium chloride infusion     Admin Date  2019 Action  New Bag Dose  25 mL/hr Rate  25 mL/hr Route  IntraVENous Administered By  Benitaalee Gianne A          CISplatin (PLATINOL) 48 mg in 0.9% sodium chloride 250 mL, overfill volume 25 mL chemo infusion     Admin Date  2019 Action  New Bag Dose  48 mg Rate  323 mL/hr Route  IntraVENous Administered By  Benitaalee Benne A          dexamethasone (DECADRON) 12 mg in 0.9% sodium chloride 50 mL IVPB     Admin Date  2019 Action  Given Dose  12 mg Route  IntraVENous Administered By  Nicolasa Springerne A          etoposide (VEPESID) 240 mg in 0.9% sodium chloride 1,000 mL, overfill volume 50 mL chemo infusion     Admin Date  2019 Action  New Bag Dose  240 mg Rate  1,062 mL/hr Route  IntraVENous Administered By  Benitaalee Benne A          heparin (porcine) pf 300-500 Units     Admin Date  2019 Action  Given Dose  500 Units Route  InterCATHeter Administered By  Chris Vergara ondansetron (ZOFRAN) injection 8 mg     Admin Date  06/27/2019 Action  Given Dose  8 mg Route  IntraVENous Administered By  Trula Glatter A          saline peripheral flush soln 10 mL     Admin Date  06/27/2019 Action  Given Dose  10 mL Route  InterCATHeter Administered By  Sarah Robertson Date  06/27/2019 Action  Given Dose  10 mL Route  InterCATHeter Administered By  Trula Glatter A          sodium chloride 0.9% injection 10 mL     Admin Date  06/27/2019 Action  Given Dose  10 mL Route  IntraVENous Administered By  Trula Glatter A                Patient port flushed; heparinized; and de-accessed per protocol. Mr. Homer Vaughan tolerated treatment well and was discharged from [place] [address] in stable condition at 1350. He is aware of when to return for his next appointment.     Future Appointments   Date Time Provider Vicki Miranda   6/28/2019 11:00 AM DANIELLE INFUSION NURSE 4 [address] REG   7/1/2019 11:00 AM DANIELLE INFUSION NURSE 4 [address] REG   7/1/2019 11:15 AM Stefanie Higuera NP ONCM[facility]WARDS SCHED   7/8/2019 11:00 AM DANIELLE INFUSION NURSE 4 [address] REG       Haley Burns  June 27, 2019

## 2019-06-28 ENCOUNTER — HOSPITAL ENCOUNTER (OUTPATIENT)
Dept: INFUSION THERAPY | Age: 34
Discharge: HOME | End: 2019-06-28
Payer: COMMERCIAL

## 2019-06-28 VITALS
WEIGHT: 232 LBS | OXYGEN SATURATION: 97 % | TEMPERATURE: 98.2 F | BODY MASS INDEX: 35.16 KG/M2 | SYSTOLIC BLOOD PRESSURE: 127 MMHG | DIASTOLIC BLOOD PRESSURE: 77 MMHG | HEIGHT: 68 IN | RESPIRATION RATE: 18 BRPM | HEART RATE: 62 BPM

## 2019-06-28 DIAGNOSIS — C62.10 MALIGNANT NEOPLASM OF DESCENDED TESTIS, UNSPECIFIED LATERALITY (HCC): Primary | ICD-10-CM

## 2019-06-28 DIAGNOSIS — R11.0 CHEMOTHERAPY-INDUCED NAUSEA: ICD-10-CM

## 2019-06-28 DIAGNOSIS — T45.1X5A CHEMOTHERAPY-INDUCED NAUSEA: ICD-10-CM

## 2019-06-28 PROCEDURE — 74011250636 HC RX REV CODE- 250/636: Performed by: INTERNAL MEDICINE

## 2019-06-28 PROCEDURE — 77030012965 HC NDL HUBR BBMI -A

## 2019-06-28 PROCEDURE — 96413 CHEMO IV INFUSION 1 HR: CPT

## 2019-06-28 PROCEDURE — 96367 TX/PROPH/DG ADDL SEQ IV INF: CPT

## 2019-06-28 PROCEDURE — 74011000258 HC RX REV CODE- 258: Performed by: INTERNAL MEDICINE

## 2019-06-28 PROCEDURE — 96375 TX/PRO/DX INJ NEW DRUG ADDON: CPT

## 2019-06-28 PROCEDURE — 96417 CHEMO IV INFUS EACH ADDL SEQ: CPT

## 2019-06-28 RX ORDER — SODIUM CHLORIDE 0.9 % (FLUSH) 0.9 %
10 SYRINGE (ML) INJECTION AS NEEDED
Status: ACTIVE | OUTPATIENT
Start: 2019-06-28 | End: 2019-06-28

## 2019-06-28 RX ORDER — HEPARIN 100 UNIT/ML
300-500 SYRINGE INTRAVENOUS AS NEEDED
Status: ACTIVE | OUTPATIENT
Start: 2019-06-28 | End: 2019-06-28

## 2019-06-28 RX ORDER — PROMETHAZINE HYDROCHLORIDE 25 MG/1
25 TABLET ORAL
Qty: 30 TAB | Refills: 0 | Status: SHIPPED | OUTPATIENT
Start: 2019-06-28 | End: 2019-07-01

## 2019-06-28 RX ORDER — SODIUM CHLORIDE 9 MG/ML
25 INJECTION, SOLUTION INTRAVENOUS CONTINUOUS
Status: DISPENSED | OUTPATIENT
Start: 2019-06-28 | End: 2019-06-28

## 2019-06-28 RX ORDER — SODIUM CHLORIDE 9 MG/ML
10 INJECTION INTRAMUSCULAR; INTRAVENOUS; SUBCUTANEOUS AS NEEDED
Status: ACTIVE | OUTPATIENT
Start: 2019-06-28 | End: 2019-06-28

## 2019-06-28 RX ORDER — PALONOSETRON 0.05 MG/ML
0.25 INJECTION, SOLUTION INTRAVENOUS ONCE
Status: COMPLETED | OUTPATIENT
Start: 2019-06-28 | End: 2019-06-28

## 2019-06-28 RX ADMIN — SODIUM CHLORIDE 25 ML/HR: 900 INJECTION, SOLUTION INTRAVENOUS at 12:10

## 2019-06-28 RX ADMIN — ETOPOSIDE 240 MG: 20 INJECTION, SOLUTION, CONCENTRATE INTRAVENOUS at 13:40

## 2019-06-28 RX ADMIN — DEXAMETHASONE SODIUM PHOSPHATE 12 MG: 4 INJECTION, SOLUTION INTRAMUSCULAR; INTRAVENOUS at 12:12

## 2019-06-28 RX ADMIN — Medication 10 ML: at 14:45

## 2019-06-28 RX ADMIN — Medication 500 UNITS: at 14:45

## 2019-06-28 RX ADMIN — Medication 10 ML: at 11:03

## 2019-06-28 RX ADMIN — PALONOSETRON HYDROCHLORIDE 0.25 MG: 0.25 INJECTION, SOLUTION INTRAVENOUS at 11:03

## 2019-06-28 RX ADMIN — CISPLATIN 48 MG: 1 INJECTION, SOLUTION INTRAVENOUS at 12:35

## 2019-06-28 RX ADMIN — POTASSIUM CHLORIDE: 2 INJECTION, SOLUTION, CONCENTRATE INTRAVENOUS at 11:10

## 2019-06-28 NOTE — PROGRESS NOTES
Per Dr. Miesha Osborn a refill for    Requested Prescriptions     Signed Prescriptions Disp Refills    promethazine (PHENERGAN) 25 mg tablet 30 Tab 0     Sig: Take 1 Tab by mouth every six (6) hours as needed for Nausea. was electronically sent to the patient's [facility] DR CAROL PACE.

## 2019-06-28 NOTE — PROGRESS NOTES
Pt arrived to [facility] ambulatory in no acute distress at 1050 for BEP C3D5.  Assessment unremarkable except nausea/vomiting. R chest port accessed without issue and positive blood return noted. Farnaz Crowe NP notified of refill request for phenergan. Pt added to schedule for hydration Monday and Wednesday next week. Patient Vitals for the past 12 hrs:   Temp Pulse Resp BP SpO2   06/28/19 1445 -- 62 18 127/77 --   06/28/19 1053 98.2 °F (36.8 °C) 94 18 128/90 97 %     The following medications administered:  [email or web address]@[employer]  Aloxi 0.25mg IVP  NS 1L with 10meq KCL and 2g Mag IV over 1 hour  Decadron 12mg IV over 10 minutes  Cisplatin 48mg IV over 1 hour  Etoposide 240mg IV over 1 hour    Pt tolerated treatment well. Port flushed per policy and de-accessed, 2x2 and tape placed.  Pt discharged ambulatory in no acute distress at 1445, accompanied by spouse. Next appointment 7/1/19 at 1100.

## 2019-07-01 ENCOUNTER — OFFICE VISIT (OUTPATIENT)
Dept: ONCOLOGY | Age: 34
End: 2019-07-01

## 2019-07-01 ENCOUNTER — HOSPITAL ENCOUNTER (OUTPATIENT)
Dept: INFUSION THERAPY | Age: 34
Discharge: HOME | End: 2019-07-01
Payer: MEDICAID

## 2019-07-01 VITALS
WEIGHT: 222 LBS | BODY MASS INDEX: 33.65 KG/M2 | SYSTOLIC BLOOD PRESSURE: 101 MMHG | RESPIRATION RATE: 20 BRPM | HEIGHT: 68 IN | DIASTOLIC BLOOD PRESSURE: 71 MMHG | OXYGEN SATURATION: 96 % | HEART RATE: 123 BPM | TEMPERATURE: 98.5 F

## 2019-07-01 VITALS
SYSTOLIC BLOOD PRESSURE: 122 MMHG | HEIGHT: 68 IN | OXYGEN SATURATION: 96 % | DIASTOLIC BLOOD PRESSURE: 85 MMHG | WEIGHT: 222.1 LBS | RESPIRATION RATE: 18 BRPM | HEART RATE: 119 BPM | TEMPERATURE: 97.9 F | BODY MASS INDEX: 33.66 KG/M2

## 2019-07-01 DIAGNOSIS — R11.2 CINV (CHEMOTHERAPY-INDUCED NAUSEA AND VOMITING): ICD-10-CM

## 2019-07-01 DIAGNOSIS — T45.1X5A CINV (CHEMOTHERAPY-INDUCED NAUSEA AND VOMITING): ICD-10-CM

## 2019-07-01 DIAGNOSIS — C62.10 MALIGNANT NEOPLASM OF DESCENDED TESTIS, UNSPECIFIED LATERALITY (HCC): Primary | ICD-10-CM

## 2019-07-01 DIAGNOSIS — C62.91 SEMINOMA OF RIGHT TESTIS, STAGE 2 (HCC): ICD-10-CM

## 2019-07-01 DIAGNOSIS — C62.11 MALIGNANT NEOPLASM OF DESCENDED RIGHT TESTIS (HCC): Primary | ICD-10-CM

## 2019-07-01 DIAGNOSIS — C62.11 MALIGNANT NEOPLASM OF DESCENDED RIGHT TESTIS (HCC): ICD-10-CM

## 2019-07-01 LAB
BASOPHILS # BLD: 0.1 K/UL (ref 0–0.1)
BASOPHILS NFR BLD: 2 % (ref 0–1)
DIFFERENTIAL METHOD BLD: ABNORMAL
EOSINOPHIL # BLD: 0 K/UL (ref 0–0.4)
EOSINOPHIL NFR BLD: 0 % (ref 0–7)
ERYTHROCYTE [DISTWIDTH] IN BLOOD BY AUTOMATED COUNT: 13.8 % (ref 11.5–14.5)
HCT VFR BLD AUTO: 39.5 % (ref 36.6–50.3)
HGB BLD-MCNC: 14 G/DL (ref 12.1–17)
IMM GRANULOCYTES # BLD AUTO: 0.1 K/UL (ref 0–0.04)
IMM GRANULOCYTES NFR BLD AUTO: 2 % (ref 0–0.5)
LYMPHOCYTES # BLD: 0.8 K/UL (ref 0.8–3.5)
LYMPHOCYTES NFR BLD: 17 % (ref 12–49)
MCH RBC QN AUTO: 31.3 PG (ref 26–34)
MCHC RBC AUTO-ENTMCNC: 35.4 G/DL (ref 30–36.5)
MCV RBC AUTO: 88.2 FL (ref 80–99)
MONOCYTES # BLD: 0.1 K/UL (ref 0–1)
MONOCYTES NFR BLD: 1 % (ref 5–13)
NEUTS SEG # BLD: 3.8 K/UL (ref 1.8–8)
NEUTS SEG NFR BLD: 78 % (ref 32–75)
NRBC # BLD: 0 K/UL (ref 0–0.01)
NRBC BLD-RTO: 0 PER 100 WBC
PLATELET # BLD AUTO: 244 K/UL (ref 150–400)
PMV BLD AUTO: 9.1 FL (ref 8.9–12.9)
RBC # BLD AUTO: 4.48 M/UL (ref 4.1–5.7)
WBC # BLD AUTO: 4.9 K/UL (ref 4.1–11.1)

## 2019-07-01 PROCEDURE — 96361 HYDRATE IV INFUSION ADD-ON: CPT

## 2019-07-01 PROCEDURE — 74011000250 HC RX REV CODE- 250: Performed by: INTERNAL MEDICINE

## 2019-07-01 PROCEDURE — 85025 COMPLETE CBC W/AUTO DIFF WBC: CPT

## 2019-07-01 PROCEDURE — 74011250636 HC RX REV CODE- 250/636: Performed by: INTERNAL MEDICINE

## 2019-07-01 PROCEDURE — 96409 CHEMO IV PUSH SNGL DRUG: CPT

## 2019-07-01 PROCEDURE — 74011000258 HC RX REV CODE- 258: Performed by: INTERNAL MEDICINE

## 2019-07-01 PROCEDURE — 74011250637 HC RX REV CODE- 250/637: Performed by: INTERNAL MEDICINE

## 2019-07-01 PROCEDURE — 96375 TX/PRO/DX INJ NEW DRUG ADDON: CPT

## 2019-07-01 PROCEDURE — 36415 COLL VENOUS BLD VENIPUNCTURE: CPT

## 2019-07-01 PROCEDURE — 77030012965 HC NDL HUBR BBMI -A

## 2019-07-01 PROCEDURE — 74011250636 HC RX REV CODE- 250/636: Performed by: NURSE PRACTITIONER

## 2019-07-01 RX ORDER — HEPARIN 100 UNIT/ML
300-500 SYRINGE INTRAVENOUS AS NEEDED
Status: ACTIVE | OUTPATIENT
Start: 2019-07-01 | End: 2019-07-01

## 2019-07-01 RX ORDER — SODIUM CHLORIDE 9 MG/ML
25 INJECTION, SOLUTION INTRAVENOUS CONTINUOUS
Status: DISCONTINUED | OUTPATIENT
Start: 2019-07-01 | End: 2019-07-05 | Stop reason: HOSPADM

## 2019-07-01 RX ORDER — ACETAMINOPHEN 325 MG/1
650 TABLET ORAL ONCE
Status: COMPLETED | OUTPATIENT
Start: 2019-07-01 | End: 2019-07-01

## 2019-07-01 RX ORDER — PROMETHAZINE HYDROCHLORIDE 25 MG/1
25 TABLET ORAL
Qty: 30 TAB | Refills: 0 | Status: ON HOLD | OUTPATIENT
Start: 2019-07-01 | End: 2019-07-09 | Stop reason: SDUPTHER

## 2019-07-01 RX ORDER — SODIUM CHLORIDE 0.9 % (FLUSH) 0.9 %
10 SYRINGE (ML) INJECTION AS NEEDED
Status: ACTIVE | OUTPATIENT
Start: 2019-07-01 | End: 2019-07-01

## 2019-07-01 RX ORDER — DIPHENHYDRAMINE HYDROCHLORIDE 50 MG/ML
25 INJECTION, SOLUTION INTRAMUSCULAR; INTRAVENOUS ONCE
Status: COMPLETED | OUTPATIENT
Start: 2019-07-01 | End: 2019-07-01

## 2019-07-01 RX ORDER — SODIUM CHLORIDE 9 MG/ML
10 INJECTION INTRAMUSCULAR; INTRAVENOUS; SUBCUTANEOUS AS NEEDED
Status: ACTIVE | OUTPATIENT
Start: 2019-07-01 | End: 2019-07-01

## 2019-07-01 RX ADMIN — BLEOMYCIN SULFATE 30 UNITS: 30 INJECTION, POWDER, LYOPHILIZED, FOR SOLUTION INTRAMUSCULAR; INTRAPLEURAL; INTRAVENOUS; SUBCUTANEOUS at 13:17

## 2019-07-01 RX ADMIN — Medication 500 UNITS: at 13:31

## 2019-07-01 RX ADMIN — DIPHENHYDRAMINE HYDROCHLORIDE 25 MG: 50 INJECTION INTRAMUSCULAR; INTRAVENOUS at 12:19

## 2019-07-01 RX ADMIN — Medication 10 ML: at 13:31

## 2019-07-01 RX ADMIN — SODIUM CHLORIDE 10 ML: 9 INJECTION, SOLUTION INTRAMUSCULAR; INTRAVENOUS; SUBCUTANEOUS at 10:55

## 2019-07-01 RX ADMIN — ACETAMINOPHEN 650 MG: 325 TABLET ORAL at 12:18

## 2019-07-01 RX ADMIN — SODIUM CHLORIDE 1000 ML: 900 INJECTION, SOLUTION INTRAVENOUS at 12:16

## 2019-07-01 NOTE — PROGRESS NOTES
Pt presents today for follow up of Testicular cancer. Pt is due for C3D8 BEP today. Nausea/Vomiting since Thursday. Unable to keep anything down. Pt has no antinausea medications at home and would like script sent to [facility]. Pt denies diarrhea/constipation. Pt complains of weakness following last treatment. Blood pressure lower today than normal.    Visit Vitals  /71 (BP 1 Location: Left arm, BP Patient Position: Sitting)   Pulse (!) 123   Temp 98.5 °F (36.9 °C) (Oral)   Resp 20   Ht 5' 8\" (1.727 m)   Wt 222 lb (100.7 kg)   SpO2 96%   BMI 33.75 kg/m²       Chief Complaint   Patient presents with    Testicular Cancer     C3D8 BEP     1. Have you been to the ER, urgent care clinic since your last visit? Hospitalized since your last visit? NO  2. Have you seen or consulted any other health care providers outside of the [address] [place] [address] since your last visit? Include any pap smears or colon screening.  NO

## 2019-07-01 NOTE — PROGRESS NOTES
[address], [address] [place] [address] Juan M Patriciou, 200 [facility]  [phone]       Oncology progress note        Patient: Dorothy Rios MRN: [id number]  SSN: [id number]    YOB: 1985  Age: 29 y.o. Sex: male        Diagnosis:      1. 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 Right sided radical orchiectomy  2. Systemic chemotherapy,   BEP - Cycle 3 Day 8    Subjective:      Dorothy Rios is a 29 y.o. male male who noted a swelling in the right testicles for over 6 months. The swelling was associated with a dragging sensation. The patient then was seen by Dr. Marcelle Lindsay. He underwent a right radical orchiectomy on 03/27/2019. The pathology shows T1c disease. A CT scan was done on 04/03 which reveals metastatic disease. Mr. Mauricio Wu is receiving his third cycle of BEP. He was hospitalized after his first cycle of treatment with neutropenic fever. He has been having nausea and vomiting, but phenergan seems to help. He is on a number of PRN meds. His symptoms are out of proportion to his appearance. Review of Systems:    Constitutional: negative  Eyes: negative  Ears, Nose, Mouth, Throat, and Face: negative  Respiratory: negative  Cardiovascular: negative  Gastrointestinal: nausea/vomiting  Genitourinary:negative  Integument/Breast: negative  Hematologic/Lymphatic: negative  Musculoskeletal:negative  Neurological: negative      Past Medical History:   Diagnosis Date    Anxiety disorder     Depression     Suicidal thoughts     Testicle cancer ([facility] [address]      Past Surgical History:   Procedure Laterality Date    HX ORTHOPAEDIC      lft knee surgery    HX ORTHOPAEDIC      right shoulder/right knee    IR INSERT TUNL CVC W PORT OVER 5 YEARS  4/18/2019      No family history on file.   Social History     Tobacco Use    Smoking status: Current Every Day Smoker     Packs/day: 0.50    Smokeless tobacco: Former User   Substance Use Topics    Alcohol use: Yes     Comment: per pt \"once a weekend 6 or 7 beers\"      Prior to Admission medications    Medication Sig Start Date End Date Taking? Authorizing Provider   promethazine (PHENERGAN) 25 mg tablet Take 1 Tab by mouth every six (6) hours as needed for Nausea. 7/1/19  Yes Janae Sanders NP   OLANZapine (ZYPREXA) 10 mg tablet Take 1 Tab by mouth nightly. Take morning of chemo and once daily for 4 days following chemo 6/10/19   Matthew Guallpa MD   dexamethasone (DECADRON) 4 mg tablet Take 8 mg by mouth two (2) times daily (with meals). 6/10/19   Matthew Guallpa MD   ondansetron (ZOFRAN ODT) 4 mg disintegrating tablet Take 1 Tab by mouth every eight (8) hours as needed for Nausea. 4/8/19   Jesús Jean NP   lidocaine-prilocaine (EMLA) topical cream Apply  to affected area as needed for Pain. 4/8/19   Jesús Jean NP          No Known Allergies        Objective:     Visit Vitals  /71 (BP 1 Location: Left arm, BP Patient Position: Sitting)   Pulse (!) 123   Temp 98.5 °F (36.9 °C) (Oral)   Resp 20   Ht 5' 8\" (1.727 m)   Wt 222 lb (100.7 kg)   SpO2 96%   BMI 33.75 kg/m²       Pain Scale: /10  Pain Location:       Physical Exam:    GENERAL: alert, cooperative, no distress, appears stated age  EYE: conjunctivae/corneas clear. PERRL, EOM's intact. Fundi benign  LYMPHATIC: Cervical, supraclavicular, and axillary nodes normal.   THROAT & NECK: normal and no erythema or exudates noted. LUNG: clear to auscultation bilaterally  HEART: regular rate and rhythm, S1, S2 normal, no murmur, click, rub or gallop  ABDOMEN: soft, non-tender. Bowel sounds normal. No masses,  no organomegaly  EXTREMITIES:  extremities normal, atraumatic, no cyanosis or edema  SKIN: extensive tattoo on the torso and arms  NEUROLOGIC: AOx3. Gait normal. Reflexes and motor strength normal and symmetric.  Cranial nerves 2-12 and sensation grossly intact. CT Results (most recent):  Results from Hospital Encounter encounter on 05/12/19   CT NECK SOFT TISSUE W CONT    Narrative EXAM: CT SOFT TISSUE NECK with Contrast    INDICATION:  Neutropenic fever. mouth and neck pain r/o abscess      TECHNIQUE: Multislice helical CT was performed from the skull base to the  thoracic inlet with 100 mL Isovue 300 IV. Contiguous 2.5 mm axial images were  reconstructed and coronal and sagittal 2D reformations were generated. CT dose  reduction was achieved through use of a standardized protocol tailored for this  examination and automatic exposure control for dose modulation. FINDINGS:  [place] tonsils are symmetrically slightly large, with central subcentimeter  mild hypodensity, not conclusive but difficult to exclude developing tonsillar  abscess particularly on the right. There are dental caries without apparent periapical or floor of mouth abscess. No mass or significant adenopathy is identified within the neck. The carotid and jugular vessels are patent. The submandibular and parotid glands are normal. Thyroid is unremarkable. No laryngeal edema or mass is identified. Deep parapharyngeal and  retropharyngeal regions are normal. Subglottic airway is clear. No abnormalities are identified in the skull base. The visualized mastoid air cells and paranasal sinuses are clear. The lung apices are clear. Impression IMPRESSION:   1. Prominent tonsils with questionable developing abscess on the right-correlate  clinically. 2. Dental caries without apparent periapical abscess.          Lab Results   Component Value Date/Time    WBC 4.9 07/01/2019 11:01 AM    HGB 14.0 07/01/2019 11:01 AM    HCT 39.5 07/01/2019 11:01 AM    PLATELET 376 39/37/4248 11:01 AM    MCV 88.2 07/01/2019 11:01 AM       Lab Results   Component Value Date/Time    Sodium 141 06/24/2019 10:12 AM    Potassium 3.9 06/24/2019 10:12 AM    Chloride 106 06/24/2019 10:12 AM    CO2 29 06/24/2019 10:12 AM    Anion gap 6 06/24/2019 10:12 AM    Glucose 97 06/24/2019 10:12 AM    BUN 10 06/24/2019 10:12 AM    Creatinine 0.94 06/24/2019 10:12 AM    BUN/Creatinine ratio 11 (L) 06/24/2019 10:12 AM    GFR est AA >60 06/24/2019 10:12 AM    GFR est non-AA >60 06/24/2019 10:12 AM    Calcium 8.9 06/24/2019 10:12 AM    Bilirubin, total 0.4 06/24/2019 10:12 AM    AST (SGOT) 13 (L) 06/24/2019 10:12 AM    Alk. phosphatase 65 06/24/2019 10:12 AM    Protein, total 6.9 06/24/2019 10:12 AM    Albumin 3.5 06/24/2019 10:12 AM    Globulin 3.4 06/24/2019 10:12 AM    A-G Ratio 1.0 (L) 06/24/2019 10:12 AM    ALT (SGPT) 32 06/24/2019 10:12 AM           Assessment:     1. 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 A detailed system by system evaluation of side effect was performed to assess chemotherapy related toxicity. Blood counts are acceptable. Results reviewed with the patient    Symptom management form reviewed and scanned into the EMR under Media.       2. Nausea/vomiting, CINV      > Aloxi and dexamethasone on treatment days  > Zofran and compazine not effective  > phenergan 25 mg q6hr as needed  > dexamethasone 8 mg po BID x 5 days following chemotherapy  > olanzapine 10 mg po daily starting the day of chemotherapy x 4 days - patient refused      Plan:       · Labs today: CBC, CMP, AFP, Beta hCG, LDH  · Continue with systemic chemotherapy  · Prophylactic antiemetics: Ondansetron and Dexamethasone IV days 1-5  · PRN antiemetics: Ondansetron and Prochlorperazine  · Additional antiemetics: phenergan, zyprexa, and dexamethasone  · Refilled Phen[facility]gan to [facility]. The other pharmacy is incorrect that Rx was sent to on 6/28/2019  · Patient has elected to discontinue treatment after this cycle  · Repeat CT scans 1 month after last treatment  · Return for follow up in 6 weeks        Signed by: Rocky Powell MD                     July 1, 2019        CC. Tayler Gil MD  CC.  Caryle Knock, MD

## 2019-07-01 NOTE — PROGRESS NOTES
[facility] Progress Note    Date: 2019    Name: Anand East    MRN: [id number]         : 1985    Mr. Gilberto Cyr arrived ambulatory at 1050 and in no distress for C3D8 Bleomycin (only). Assessment was completed, no acute issues at this time, no new complaints voiced. Patient continues to experience some Nausea and vomiting of spit. Patient states that he did not  his prescription of phergan because he was not aware it was called in at was ready at pharmacy, MD stated in notes that patient was made aware. RCW port accessed without difficulty with 0.75 lópez needle; + blood return noted, labs drawn and sent. Proceeded to appt with Dr. Mr. Green's vitals were reviewed. Patient Vitals for the past 12 hrs:   Temp Pulse Resp BP SpO2   19 1051 97.9 °F (36.6 °C) (!) 119 18 122/85 96 %       Lab results were obtained and reviewed. Recent Results (from the past 12 hour(s))   CBC WITH AUTOMATED DIFF    Collection Time: 19 11:01 AM   Result Value Ref Range    WBC 4.9 4.1 - 11.1 K/uL    RBC 4.48 4.10 - 5.70 M/uL    HGB 14.0 12.1 - 17.0 g/dL    HCT 39.5 36.6 - 50.3 %    MCV 88.2 80.0 - 99.0 FL    MCH 31.3 26.0 - 34.0 PG    MCHC 35.4 30.0 - 36.5 g/dL    RDW 13.8 11.5 - 14.5 %    PLATELET 054 968 - 133 K/uL    MPV 9.1 8.9 - 12.9 FL    NRBC 0.0 0  WBC    ABSOLUTE NRBC 0.00 0.00 - 0.01 K/uL    NEUTROPHILS 78 (H) 32 - 75 %    LYMPHOCYTES 17 12 - 49 %    MONOCYTES 1 (L) 5 - 13 %    EOSINOPHILS 0 0 - 7 %    BASOPHILS 2 (H) 0 - 1 %    IMMATURE GRANULOCYTES 2 (H) 0.0 - 0.5 %    ABS. NEUTROPHILS 3.8 1.8 - 8.0 K/UL    ABS. LYMPHOCYTES 0.8 0.8 - 3.5 K/UL    ABS. MONOCYTES 0.1 0.0 - 1.0 K/UL    ABS. EOSINOPHILS 0.0 0.0 - 0.4 K/UL    ABS. BASOPHILS 0.1 0.0 - 0.1 K/UL    ABS. IMM. GRANS. 0.1 (H) 0.00 - 0.04 K/UL    DF AUTOMATED       Patient's labs within parameters for treatment. Pre-medications  were administered as ordered and chemotherapy was initiated.      Medication:  Medications Administered     acetaminophen (TYLENOL) tablet 650 mg     Admin Date  07/01/2019 Action  Given Dose  650 mg Route  Oral Administered By  Dauna Presto A          bleomycin (BLEOCIN) 30 Units in 0.9% sodium chloride 50 mL, overfill volume 5 mL chemo infusion     Admin Date  07/01/2019 Action  New Bag Dose  30 Units Rate  390 mL/hr Route  IntraVENous Administered By  Dauna Presto A          diphenhydrAMINE (BENADRYL) injection 25 mg     Admin Date  07/01/2019 Action  Given Dose  25 mg Route  IntraVENous Administered By  Dauna Presto A          heparin (porcine) pf 300-500 Units     Admin Date  07/01/2019 Action  Given Dose  500 Units Route  InterCATHeter Administered By  Dauna Presto A          saline peripheral flush soln 10 mL     Admin Date  07/01/2019 Action  Given Dose  10 mL Route  InterCATHeter Administered By  Dauna Presto A          sodium chloride 0.9 % bolus infusion 1,000 mL     Admin Date  07/01/2019 Action  New Bag Dose  1000 mL Rate  1,000 mL/hr Route  IntraVENous Administered By  Dauna Presto A          sodium chloride 0.9% injection 10 mL     Admin Date  07/01/2019 Action  Given Dose  10 mL Route  IntraVENous Administered By  Dauna Presto A                Patient port flushed; heparinized; and de-accessed per protocol. Mr. Abel Irene tolerated treatment well and was discharged from [place] [address] in stable condition at 1330. He is aware of when to return for his next appointment.     Future Appointments   Date Time Provider Vicki Miranda   7/1/2019 11:00 AM DANIELLE INFUSION NURSE 4 [facility] REG   7/1/2019 11:15 AM George Geronimo NP ONCMR EDWARDS SCHED   [facility]  [facility]   7/8/2019 11:00 AM [place] INFUSION NURSE 4 [facility] REG   7/15/2019 10:00 AM [place] INFUSION NURSE 4 [facility] REG   [facility]2019 [facility]   7/17/2019 11:00 AM [place] INFUSION NURSE 4 [facility] REG   7/18/2019 11:00 AM HANOVER INFUSION NURSE 4 [facility] REG   7/19/2019 11:00 AM HANOVER INFUSION NURSE 4 [facility] REG   7/22/2019 11:00 AM DANIELLE INFUSION NURSE 4 [facility] REG   7/29/2019 11:00 AM DANIELLE INFUSION NURSE 4 [address] REG       Haley Burns  July 1, 2019

## 2019-07-03 ENCOUNTER — HOSPITAL ENCOUNTER (OUTPATIENT)
Dept: INFUSION THERAPY | Age: 34
Discharge: HOME | End: 2019-07-03
Payer: MEDICAID

## 2019-07-03 VITALS
TEMPERATURE: 98.6 F | DIASTOLIC BLOOD PRESSURE: 79 MMHG | SYSTOLIC BLOOD PRESSURE: 136 MMHG | RESPIRATION RATE: 18 BRPM | OXYGEN SATURATION: 96 % | HEART RATE: 82 BPM

## 2019-07-03 DIAGNOSIS — C62.11 MALIGNANT NEOPLASM OF DESCENDED RIGHT TESTIS (HCC): Primary | ICD-10-CM

## 2019-07-03 PROCEDURE — 74011250636 HC RX REV CODE- 250/636: Performed by: INTERNAL MEDICINE

## 2019-07-03 PROCEDURE — 74011250636 HC RX REV CODE- 250/636: Performed by: NURSE PRACTITIONER

## 2019-07-03 PROCEDURE — 96360 HYDRATION IV INFUSION INIT: CPT

## 2019-07-03 PROCEDURE — 77030012965 HC NDL HUBR BBMI -A

## 2019-07-03 PROCEDURE — 74011000250 HC RX REV CODE- 250: Performed by: INTERNAL MEDICINE

## 2019-07-03 PROCEDURE — 96375 TX/PRO/DX INJ NEW DRUG ADDON: CPT

## 2019-07-03 RX ORDER — SODIUM CHLORIDE 0.9 % (FLUSH) 0.9 %
10-40 SYRINGE (ML) INJECTION AS NEEDED
Status: DISCONTINUED | OUTPATIENT
Start: 2019-07-03 | End: 2019-07-04 | Stop reason: HOSPADM

## 2019-07-03 RX ORDER — ONDANSETRON 2 MG/ML
8 INJECTION INTRAMUSCULAR; INTRAVENOUS ONCE
Status: COMPLETED | OUTPATIENT
Start: 2019-07-03 | End: 2019-07-03

## 2019-07-03 RX ORDER — HEPARIN 100 UNIT/ML
500 SYRINGE INTRAVENOUS AS NEEDED
Status: DISCONTINUED | OUTPATIENT
Start: 2019-07-03 | End: 2019-07-04 | Stop reason: HOSPADM

## 2019-07-03 RX ADMIN — Medication 10 ML: at 15:30

## 2019-07-03 RX ADMIN — SODIUM CHLORIDE 10 ML: 9 INJECTION, SOLUTION INTRAMUSCULAR; INTRAVENOUS; SUBCUTANEOUS at 14:25

## 2019-07-03 RX ADMIN — SODIUM CHLORIDE 1000 ML: 900 INJECTION, SOLUTION INTRAVENOUS at 14:30

## 2019-07-03 RX ADMIN — ONDANSETRON 8 MG: 2 INJECTION, SOLUTION INTRAMUSCULAR; INTRAVENOUS at 14:34

## 2019-07-03 RX ADMIN — Medication 500 UNITS: at 15:30

## 2019-07-03 NOTE — PROGRESS NOTES
[facility] Progress Note    1400- Pt admit to [facility] for Hydration ambulatory in stable condition. Assessment completed. Pt presents with elevated HR and c/o nausea & vomiting. MD office notified, Ritchie Radford received to give 8 mg Zofran IVP. Right chest port accessed without issue with positive blood return noted. Visit Vitals  /88 (BP 1 Location: Left arm, BP Patient Position: Sitting)   Pulse (!) 129   Temp 98.6 °F (37 °C)   Resp 18   SpO2 95%     Medications:  Zofran IVP  Normal Saline 1 L bolus    Visit Vitals  /79   Pulse 82   Temp 98.6 °F (37 °C)   Resp 18   SpO2 96%       1530- Pt tolerated treatment well. D/c home ambulatory in no distress.  Pt aware of next appointment scheduled for 7/8 at 11 AM.

## 2019-07-06 ENCOUNTER — HOSPITAL ENCOUNTER (INPATIENT)
Age: 34
LOS: 1 days | Discharge: HOME | DRG: 640 | End: 2019-07-09
Attending: EMERGENCY MEDICINE | Admitting: INTERNAL MEDICINE
Payer: COMMERCIAL

## 2019-07-06 ENCOUNTER — APPOINTMENT (OUTPATIENT)
Dept: GENERAL RADIOLOGY | Age: 34
DRG: 640 | End: 2019-07-06
Attending: INTERNAL MEDICINE
Payer: COMMERCIAL

## 2019-07-06 ENCOUNTER — APPOINTMENT (OUTPATIENT)
Dept: CT IMAGING | Age: 34
DRG: 640 | End: 2019-07-06
Attending: EMERGENCY MEDICINE
Payer: COMMERCIAL

## 2019-07-06 DIAGNOSIS — E86.0 DEHYDRATION: ICD-10-CM

## 2019-07-06 DIAGNOSIS — T45.1X5A PANCYTOPENIA DUE TO ANTINEOPLASTIC CHEMOTHERAPY (HCC): ICD-10-CM

## 2019-07-06 DIAGNOSIS — C62.11 MALIGNANT NEOPLASM OF DESCENDED RIGHT TESTIS (HCC): Primary | ICD-10-CM

## 2019-07-06 DIAGNOSIS — D70.1 CHEMOTHERAPY-INDUCED NEUTROPENIA (HCC): ICD-10-CM

## 2019-07-06 DIAGNOSIS — T45.1X5A CHEMOTHERAPY-INDUCED NEUTROPENIA (HCC): ICD-10-CM

## 2019-07-06 DIAGNOSIS — R11.2 INTRACTABLE VOMITING WITH NAUSEA, UNSPECIFIED VOMITING TYPE: ICD-10-CM

## 2019-07-06 DIAGNOSIS — D61.810 PANCYTOPENIA DUE TO ANTINEOPLASTIC CHEMOTHERAPY (HCC): ICD-10-CM

## 2019-07-06 LAB
ALBUMIN SERPL-MCNC: 3.5 G/DL (ref 3.5–5)
ALBUMIN/GLOB SERPL: 1 {RATIO} (ref 1.1–2.2)
ALP SERPL-CCNC: 64 U/L (ref 45–117)
ALT SERPL-CCNC: 29 U/L (ref 12–78)
ANION GAP SERPL CALC-SCNC: 10 MMOL/L (ref 5–15)
AST SERPL-CCNC: 6 U/L (ref 15–37)
BASOPHILS # BLD: 0 K/UL (ref 0–0.1)
BASOPHILS NFR BLD: 0 % (ref 0–1)
BILIRUB SERPL-MCNC: 1 MG/DL (ref 0.2–1)
BUN SERPL-MCNC: 17 MG/DL (ref 6–20)
BUN/CREAT SERPL: 18 (ref 12–20)
CALCIUM SERPL-MCNC: 9.2 MG/DL (ref 8.5–10.1)
CHLORIDE SERPL-SCNC: 98 MMOL/L (ref 97–108)
CO2 SERPL-SCNC: 26 MMOL/L (ref 21–32)
CREAT SERPL-MCNC: 0.92 MG/DL (ref 0.7–1.3)
DIFFERENTIAL METHOD BLD: ABNORMAL
EOSINOPHIL # BLD: 0 K/UL (ref 0–0.4)
EOSINOPHIL NFR BLD: 0 % (ref 0–7)
ERYTHROCYTE [DISTWIDTH] IN BLOOD BY AUTOMATED COUNT: 12.9 % (ref 11.5–14.5)
GLOBULIN SER CALC-MCNC: 3.4 G/DL (ref 2–4)
GLUCOSE SERPL-MCNC: 147 MG/DL (ref 65–100)
HCT VFR BLD AUTO: 30.5 % (ref 36.6–50.3)
HGB BLD-MCNC: 11 G/DL (ref 12.1–17)
IMM GRANULOCYTES # BLD AUTO: 0 K/UL (ref 0–0.04)
IMM GRANULOCYTES NFR BLD AUTO: 0 % (ref 0–0.5)
LIPASE SERPL-CCNC: 34 U/L (ref 73–393)
LYMPHOCYTES # BLD: 0.5 K/UL (ref 0.8–3.5)
LYMPHOCYTES NFR BLD: 62 % (ref 12–49)
MCH RBC QN AUTO: 30.9 PG (ref 26–34)
MCHC RBC AUTO-ENTMCNC: 36.1 G/DL (ref 30–36.5)
MCV RBC AUTO: 85.7 FL (ref 80–99)
MONOCYTES # BLD: 0.1 K/UL (ref 0–1)
MONOCYTES NFR BLD: 20 % (ref 5–13)
NEUTS BAND NFR BLD MANUAL: 2 %
NEUTS SEG # BLD: 0.1 K/UL (ref 1.8–8)
NEUTS SEG NFR BLD: 16 % (ref 32–75)
NRBC # BLD: 0 K/UL (ref 0–0.01)
NRBC BLD-RTO: 0 PER 100 WBC
PLATELET # BLD AUTO: 50 K/UL (ref 150–400)
PMV BLD AUTO: 11 FL (ref 8.9–12.9)
POTASSIUM SERPL-SCNC: 3.3 MMOL/L (ref 3.5–5.1)
PROT SERPL-MCNC: 6.9 G/DL (ref 6.4–8.2)
RBC # BLD AUTO: 3.56 M/UL (ref 4.1–5.7)
RBC MORPH BLD: ABNORMAL
SODIUM SERPL-SCNC: 134 MMOL/L (ref 136–145)
TOTAL CELLS COUNTED SPEC: 50
WBC # BLD AUTO: 0.7 K/UL (ref 4.1–11.1)

## 2019-07-06 PROCEDURE — 99285 EMERGENCY DEPT VISIT HI MDM: CPT

## 2019-07-06 PROCEDURE — 96374 THER/PROPH/DIAG INJ IV PUSH: CPT

## 2019-07-06 PROCEDURE — 74011250636 HC RX REV CODE- 250/636: Performed by: INTERNAL MEDICINE

## 2019-07-06 PROCEDURE — 93005 ELECTROCARDIOGRAM TRACING: CPT

## 2019-07-06 PROCEDURE — 36415 COLL VENOUS BLD VENIPUNCTURE: CPT

## 2019-07-06 PROCEDURE — 74011250637 HC RX REV CODE- 250/637: Performed by: INTERNAL MEDICINE

## 2019-07-06 PROCEDURE — 99218 HC RM OBSERVATION: CPT

## 2019-07-06 PROCEDURE — 74022 RADEX COMPL AQT ABD SERIES: CPT

## 2019-07-06 PROCEDURE — 83690 ASSAY OF LIPASE: CPT

## 2019-07-06 PROCEDURE — 85025 COMPLETE CBC W/AUTO DIFF WBC: CPT

## 2019-07-06 PROCEDURE — 74011250636 HC RX REV CODE- 250/636: Performed by: EMERGENCY MEDICINE

## 2019-07-06 PROCEDURE — 74011000250 HC RX REV CODE- 250: Performed by: EMERGENCY MEDICINE

## 2019-07-06 PROCEDURE — 80053 COMPREHEN METABOLIC PANEL: CPT

## 2019-07-06 PROCEDURE — 96375 TX/PRO/DX INJ NEW DRUG ADDON: CPT

## 2019-07-06 PROCEDURE — 96361 HYDRATE IV INFUSION ADD-ON: CPT

## 2019-07-06 PROCEDURE — 70450 CT HEAD/BRAIN W/O DYE: CPT

## 2019-07-06 PROCEDURE — C9113 INJ PANTOPRAZOLE SODIUM, VIA: HCPCS | Performed by: EMERGENCY MEDICINE

## 2019-07-06 RX ORDER — BLEOMYCIN 30 [USP'U]/1
30 POWDER, FOR SOLUTION INTRAMUSCULAR; INTRAPLEURAL; INTRAVENOUS; SUBCUTANEOUS
COMMUNITY
End: 2020-02-26

## 2019-07-06 RX ORDER — FACIAL-BODY WIPES
10 EACH TOPICAL DAILY PRN
Status: DISCONTINUED | OUTPATIENT
Start: 2019-07-06 | End: 2019-07-09 | Stop reason: HOSPADM

## 2019-07-06 RX ORDER — ENOXAPARIN SODIUM 100 MG/ML
40 INJECTION SUBCUTANEOUS EVERY 24 HOURS
Status: DISCONTINUED | OUTPATIENT
Start: 2019-07-07 | End: 2019-07-09 | Stop reason: HOSPADM

## 2019-07-06 RX ORDER — PROMETHAZINE HYDROCHLORIDE 25 MG/1
50 TABLET ORAL
Status: DISCONTINUED | OUTPATIENT
Start: 2019-07-06 | End: 2019-07-09 | Stop reason: HOSPADM

## 2019-07-06 RX ORDER — PANTOPRAZOLE SODIUM 40 MG/1
40 TABLET, DELAYED RELEASE ORAL
Status: DISCONTINUED | OUTPATIENT
Start: 2019-07-06 | End: 2019-07-09 | Stop reason: HOSPADM

## 2019-07-06 RX ORDER — DIPHENHYDRAMINE HCL 25 MG
25 CAPSULE ORAL
Status: DISCONTINUED | OUTPATIENT
Start: 2019-07-06 | End: 2019-07-09 | Stop reason: HOSPADM

## 2019-07-06 RX ORDER — ONDANSETRON 2 MG/ML
4 INJECTION INTRAMUSCULAR; INTRAVENOUS
Status: DISCONTINUED | OUTPATIENT
Start: 2019-07-06 | End: 2019-07-09 | Stop reason: HOSPADM

## 2019-07-06 RX ORDER — DIPHENHYDRAMINE HCL 25 MG
25 CAPSULE ORAL
COMMUNITY
End: 2020-02-26

## 2019-07-06 RX ORDER — ACETAMINOPHEN 500 MG
1000 TABLET ORAL
COMMUNITY
End: 2020-02-26

## 2019-07-06 RX ORDER — NALOXONE HYDROCHLORIDE 0.4 MG/ML
0.4 INJECTION, SOLUTION INTRAMUSCULAR; INTRAVENOUS; SUBCUTANEOUS AS NEEDED
Status: DISCONTINUED | OUTPATIENT
Start: 2019-07-06 | End: 2019-07-09 | Stop reason: HOSPADM

## 2019-07-06 RX ORDER — OXYCODONE AND ACETAMINOPHEN 5; 325 MG/1; MG/1
1 TABLET ORAL
Status: DISCONTINUED | OUTPATIENT
Start: 2019-07-06 | End: 2019-07-09 | Stop reason: HOSPADM

## 2019-07-06 RX ORDER — PROMETHAZINE HYDROCHLORIDE 25 MG/1
25 TABLET ORAL
Status: DISCONTINUED | OUTPATIENT
Start: 2019-07-06 | End: 2019-07-06

## 2019-07-06 RX ORDER — SODIUM CHLORIDE 0.9 % (FLUSH) 0.9 %
5-40 SYRINGE (ML) INJECTION EVERY 8 HOURS
Status: DISCONTINUED | OUTPATIENT
Start: 2019-07-06 | End: 2019-07-09 | Stop reason: HOSPADM

## 2019-07-06 RX ORDER — ACETAMINOPHEN 325 MG/1
650 TABLET ORAL
Status: DISCONTINUED | OUTPATIENT
Start: 2019-07-06 | End: 2019-07-09 | Stop reason: HOSPADM

## 2019-07-06 RX ORDER — POTASSIUM CHLORIDE AND SODIUM CHLORIDE 900; 300 MG/100ML; MG/100ML
INJECTION, SOLUTION INTRAVENOUS CONTINUOUS
Status: DISCONTINUED | OUTPATIENT
Start: 2019-07-06 | End: 2019-07-06

## 2019-07-06 RX ORDER — PROCHLORPERAZINE MALEATE 10 MG
10 TABLET ORAL
COMMUNITY
End: 2019-07-06

## 2019-07-06 RX ORDER — SODIUM CHLORIDE 0.9 % (FLUSH) 0.9 %
5-40 SYRINGE (ML) INJECTION AS NEEDED
Status: DISCONTINUED | OUTPATIENT
Start: 2019-07-06 | End: 2019-07-09 | Stop reason: HOSPADM

## 2019-07-06 RX ORDER — METOCLOPRAMIDE HYDROCHLORIDE 5 MG/ML
10 INJECTION INTRAMUSCULAR; INTRAVENOUS
Status: COMPLETED | OUTPATIENT
Start: 2019-07-06 | End: 2019-07-06

## 2019-07-06 RX ADMIN — METOCLOPRAMIDE 10 MG: 5 INJECTION, SOLUTION INTRAMUSCULAR; INTRAVENOUS at 13:03

## 2019-07-06 RX ADMIN — SODIUM CHLORIDE 40 MG: 9 INJECTION, SOLUTION INTRAMUSCULAR; INTRAVENOUS; SUBCUTANEOUS at 11:46

## 2019-07-06 RX ADMIN — PANTOPRAZOLE SODIUM 40 MG: 40 TABLET, DELAYED RELEASE ORAL at 19:24

## 2019-07-06 RX ADMIN — PROCHLORPERAZINE EDISYLATE 10 MG: 5 INJECTION INTRAMUSCULAR; INTRAVENOUS at 11:42

## 2019-07-06 RX ADMIN — POTASSIUM CHLORIDE: 2 INJECTION, SOLUTION, CONCENTRATE INTRAVENOUS at 17:39

## 2019-07-06 RX ADMIN — PROMETHAZINE HYDROCHLORIDE 50 MG: 25 TABLET ORAL at 17:39

## 2019-07-06 RX ADMIN — SODIUM CHLORIDE 1000 ML: 900 INJECTION, SOLUTION INTRAVENOUS at 11:40

## 2019-07-06 NOTE — PROGRESS NOTES
Pharmacy Clarification of Prior to Admission Medication Regimen     The patient was interviewed regarding clarification of the prior to admission medication regimen and was questioned regarding use of any other inhalers, topical products, over the counter medications, herbal medications, vitamin products or ophthalmic/nasal/otic medication use. Information Obtained From: Patient, [facility] report, RX Query    Pertinent Pharmacy Findings:  Identified High Alert Medication Information  Current IV Chemotherapy Regimen: bleomycin (BLENOXANE) 30 unit injection  Oncologist: Dr. Mayra Gonzales  Location receiving chemotherapy: [address] [place] [address]  Patient stated his last treatment is scheduled for 19    PTA medication list was corrected to the following:     Prior to Admission Medications   Prescriptions Last Dose Informant Patient Reported? Taking?   acetaminophen (TYLENOL EXTRA STRENGTH) 500 mg tablet 2019 at Unknown time Self Yes Yes   Sig: Take 1,000 mg by mouth every six (6) hours as needed for Pain. bleomycin (BLENOXANE) 30 unit injection 2019 at Unknown time Other Yes Yes   Si Units by IntraVENous route every seven (7) days. diphenhydrAMINE (BENADRYL) 25 mg capsule 2019 at Unknown time Self Yes Yes   Sig: Take 25 mg by mouth every six (6) hours as needed for Itching or Skin Irritation. lidocaine-prilocaine (EMLA) topical cream 2019 at Unknown time Self No Yes   Sig: Apply  to affected area as needed for Pain. promethazine (PHENERGAN) 25 mg tablet 2019 at Unknown time Self No Yes   Sig: Take 1 Tab by mouth every six (6) hours as needed for Nausea.       Facility-Administered Medications: None          Thank you,  Hetal Mena [facility]  Medication History Pharmacy Technician

## 2019-07-06 NOTE — ED TRIAGE NOTES
Received patient to exam room. Pt in bed in position of comfort and call bell within reach. Pt c/o nausea and vomiting x2 wks multiple times a day with loose stools.   Pt being treated with chemo for testicular cancer with lymph node involvement

## 2019-07-06 NOTE — ED PROVIDER NOTES
EMERGENCY DEPARTMENT HISTORY AND PHYSICAL EXAM      Date: 7/6/2019  Patient Name: Trish Benjamin    Please note that this dictation was completed with [email or web address], the computer voice recognition software. Quite often unanticipated grammatical, syntax, homophones, and other interpretive errors are inadvertently transcribed by the computer software. Please disregard these errors. Please excuse any errors that have escaped final proofreading. History of Presenting Illness     Chief Complaint   Patient presents with    Vomiting     pt takes chemo and has had n/v x 2 weeks. unable to tolerate po intake at all       History Provided By: Patient    HPI: Trish Query, 29 y.o. male, with a past medical history significant for testicular cancer with metastasis getting chemotherapy presenting the emergency department complaining of nausea and vomiting. Patient states is been unable to keep anything down for the past several days. He reports your episodes of nausea and vomiting. They are not relieved by any prescription antiemetic that he has at home including Zofran, Compazine, Phenergan he decided to come in today denies any fever, chills, cough, due to the ongoing symptoms. Hematochezia or melena. Still passing stools. Admits to some looser stools. No history of obstruction. PCP: Priya Morton MD    No current facility-administered medications on file prior to encounter. Current Outpatient Medications on File Prior to Encounter   Medication Sig Dispense Refill    prochlorperazine (COMPAZINE) 10 mg tablet Take 10 mg by mouth every six (6) hours as needed.  acetaminophen (TYLENOL EXTRA STRENGTH) 500 mg tablet Take 1,000 mg by mouth every six (6) hours as needed for Pain.  diphenhydrAMINE (BENADRYL) 25 mg capsule Take 25 mg by mouth every six (6) hours as needed.  promethazine (PHENERGAN) 25 mg tablet Take 1 Tab by mouth every six (6) hours as needed for Nausea.  30 Tab 0    OLANZapine (ZYPREXA) 10 mg tablet Take 1 Tab by mouth nightly. Take morning of chemo and once daily for 4 days following chemo 30 Tab 0    ondansetron (ZOFRAN ODT) 4 mg disintegrating tablet Take 1 Tab by mouth every eight (8) hours as needed for Nausea. 30 Tab 2    lidocaine-prilocaine (EMLA) topical cream Apply  to affected area as needed for Pain. 30 g 0       Past History     Past Medical History:  Past Medical History:   Diagnosis Date    Anxiety disorder     Depression     Suicidal thoughts     Testicle cancer ([facility] [address]        Past Surgical History:  Past Surgical History:   Procedure Laterality Date    HX ORTHOPAEDIC      lft knee surgery    HX ORTHOPAEDIC      right shoulder/right knee    HX OTHER SURGICAL      testiclwe removed    IR INSERT TUNL CVC W PORT OVER 5 YEARS  4/18/2019       Family History:  History reviewed. No pertinent family history. Social History:  Social History     Tobacco Use    Smoking status: Current Every Day Smoker     Packs/day: 0.50    Smokeless tobacco: Former User   Substance Use Topics    Alcohol use: Yes     Comment: per pt \"once a weekend 6 or 7 beers\"    Drug use: No       Allergies:  No Known Allergies      Review of Systems   Review of Systems   Constitutional: Negative for chills and fever. HENT: Negative for congestion and sore throat. Eyes: Negative for visual disturbance. Respiratory: Negative for cough and shortness of breath. Cardiovascular: Negative for chest pain and leg swelling. Gastrointestinal: Positive for nausea and vomiting. Negative for abdominal pain, blood in stool and diarrhea. Endocrine: Negative for polyuria. Genitourinary: Negative for dysuria and testicular pain. Musculoskeletal: Negative for arthralgias, joint swelling and myalgias. Skin: Negative for rash. Allergic/Immunologic: Negative for immunocompromised state. Neurological: Negative for weakness and headaches. Hematological: Does not bruise/bleed easily. Psychiatric/Behavioral: Negative for confusion. Physical Exam   Physical Exam   Constitutional: oriented to person, place, and time. appears well-developed and well-nourished. HENT:   Head: Normocephalic and atraumatic. Moist mucous membranes   Eyes: Pupils are equal, round, and reactive to light. Conjunctivae are normal. Right eye exhibits no discharge. Left eye exhibits no discharge. Neck: Normal range of motion. Neck supple. No tracheal deviation present. Cardiovascular: tachycardia rate, regular rhythm and normal heart sounds. No murmur heard. Pulmonary/Chest: Effort normal and breath sounds normal. No respiratory distress. no wheezes. no rales. Abdominal: Soft. Bowel sounds are normal. There is no tenderness. There is no rebound and no guarding. Musculoskeletal: Normal range of motion. exhibits no edema, tenderness or deformity. Neurological: alert and oriented to person, place, and time. Skin: Skin is warm and dry. No rash noted. No erythema. Psychiatric: behavior is normal.   Nursing note and vitals reviewed. Diagnostic Study Results     Labs -     Recent Results (from the past 12 hour(s))   EKG, 12 LEAD, INITIAL    Collection Time: 07/06/19 10:59 AM   Result Value Ref Range    Ventricular Rate 123 BPM    Atrial Rate 123 BPM    P-R Interval 136 ms    QRS Duration 84 ms    Q-T Interval 316 ms    QTC Calculation (Bezet) 452 ms    Calculated P Axis 63 degrees    Calculated R Axis -22 degrees    Calculated T Axis 62 degrees    Diagnosis       Sinus tachycardia  Possible Left atrial enlargement  When compared with ECG of 12-MAY-2019 17:00,  No significant change was found         Radiologic Studies -   No orders to display     CT Results  (Last 48 hours)    None        CXR Results  (Last 48 hours)    None            Medical Decision Making   I am the first provider for this patient.     I reviewed the vital signs, available nursing notes, past medical history, past surgical history, family history and social history. Vital Signs-Reviewed the patient's vital signs. Patient Vitals for the past 12 hrs:   Temp Pulse Resp BP SpO2   07/06/19 1052 98.4 °F (36.9 °C) (!) 140 16 132/73 98 %           Records Reviewed: Nursing Notes, Old Medical Records, Previous Radiology Studies and Previous Laboratory Studies    Provider Notes (Medical Decision Making):   Patient presenting with nausea and vomiting. Is intractable. Likely related to the chemotherapy. No signs of acute abdomen on physical exam, clinically doubt obstruction. Patient is neutropenic, but does not have a fever. No need for empiric antibiotics. Discussed with oncology Dr. Rosa Meier, she reports there is no need for any growth factor at this time. Given patient's intractable nausea and vomiting we will plan for hospitalization on the medical service. ED Course:   Initial assessment performed. The patients presenting problems have been discussed, and they are in agreement with the care plan formulated and outlined with them. I have encouraged them to ask questions as they arise throughout their visit. 12:58 PM  Discussed with Dr. Robson Andre, Will evaluate for hospitalization. May discharge him. Critical Care Time:   none    Disposition:  Patient to be evaluated by Dr. Robson Andre for hospitalization. [employer] [place] PLAN:  1. Current Discharge Medication List        2. Follow-up Information    None         Return to ED if worse     Diagnosis     Clinical Impression: No diagnosis found. Attestations:   This note was completed by Randi Martinez DO

## 2019-07-06 NOTE — H&P
Hospitalist Admission Note    NAME:  Rima Talbert   :   1985   MRN:   [id number]     Date of admit: 2019    PCP: Diana Jung MD    Assessment/Plan:     Intractable nausea vomiting x2 weeks POA  Mild hypokalemia POA  Known right testicular cancer with lymph node mets  Currently undergoing chemotherapy with Dr. Lily Liu  Just completing his third cycle  Some nausea vomiting with the prior cycles  Past 2 weeks, intractable nausea vomiting, greater than 10 times per day  No bleeding or melena  Current symptoms are far beyond what he experienced with the previous cycles  Offered outpatient management, patient wanted admission for symptom relief  Observation  Oncology  IV fluids with potassium  P.o. Zofran tray p.o. q. 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 Lily Liu  No fevers currently  We will monitor fever curve in the hospital  Blood cultures and empiric antibiotics should he spike a fever    Anxiety/depression POA  Not currently on therapy next    Overweight  POA Body mass index is 28.97 kg/m². Given the patient's current clinical presentation, I have a high level of concern for decompensation if discharged from the emergency department. My assessment of this patient's clinical condition and my plan of care is as noted above.     DVT prophylaxis with lovenox if platelets > 90,157    Code status: full code  NOK: mother    History     CHIEF COMPLAINT:  \"I have been throwing up all day for the past 2 weeks\"    HISTORY OF PRESENT ILLNESS:    30-year-old white male    Diagnosed with seminomatous germ cell tumor of the right testes, stage IIb in March 2019  Status post right orchiectomy  Currently undergoing systemic chemotherapy with BEP, cycle #3    Previous nausea vomiting with his chemo, usually managed with Phenergan  Past 2 weeks it has become \"terrible\"  Recurrent nausea vomiting the past  Constant vomiting over 10 times per day  Vomits whether he eats or not  This feels significantly worse to me the prior vomiting he had with his chemo  No significant abdominal pain  Denies diarrhea, melena, bright red blood per rectum, coffee-ground emesis or hematemesis  Taking his Phenergan but now is not helping  Mild dry cough  Sub-substernal chest pain with vomiting  No fevers or chills, dysuria, skin rashes    ED because of the intractable vomiting  Tachycardic, received IV fluids  Potassium borderline low 3.3 neutropenic white blood cell count 0.7 with an ANC of 0.1  Hemoglobin 11.0  Platelet count 09,155  ED and myself offered outpatient management  Her concerns about a neutropenic patient in the hospital were discussed  However patient did not feel he can manage this as an outpatient  Dr. Dana Reyes team was consulted they ordered a head CT scan  I ordered a KUB and chest x-ray  We will admit for observation for symptom management and for oncology evaluation      Past Medical History:   Diagnosis Date    Anxiety disorder     Depression     Suicidal thoughts     Testicle cancer ([facility] [address]         Past Surgical History:   Procedure Laterality Date    HX ORTHOPAEDIC      lft knee surgery    HX ORTHOPAEDIC      right shoulder/right knee    HX OTHER SURGICAL      testiclwe removed    IR INSERT TUNL CVC W PORT OVER 5 YEARS  4/18/2019       Social History     Tobacco Use    Smoking status: Current Every Day Smoker     Packs/day: 0.50    Smokeless tobacco: Former User   Substance Use Topics    Alcohol use:  Yes Comment: per pt \"once a weekend 6 or 7 beers\"    Lives with Mother    Family history:  Adopted, unknown family history  1 child healthy    No Known Allergies     Prior to Admission medications    Medication Sig Start Date End Date Taking? Authorizing Provider   acetaminophen (TYLENOL EXTRA STRENGTH) 500 mg tablet Take 1,000 mg by mouth every six (6) hours as needed for Pain. Yes Other, MD Janice   diphenhydrAMINE (BENADRYL) 25 mg capsule Take 25 mg by mouth every six (6) hours as needed for Itching or Skin Irritation. Yes Other, MD Janice   bleomycin (BLENOXANE) 30 unit injection 30 Units by IntraVENous route every seven (7) days. Yes Provider, Lluvia   promethazine (PHENERGAN) 25 mg tablet Take 1 Tab by mouth every six (6) hours as needed for Nausea. 7/1/19  Yes Janae Sanders NP   lidocaine-prilocaine (EMLA) topical cream Apply  to affected area as needed for Pain.  4/8/19  Yes Liang Dwyer NP       Review of symptoms:     POSITIVE= Bold  Negative = not bold  General:  fever, chills, sweats, generalized weakness, weight loss     loss of appetite   Eyes:    blurred vision, eye pain, double vision  ENT:    Coryza, sore throat, trouble swallowing  Respiratory:   cough, sputum, SOB  Cardiology:   chest pain with vomiting, orthopnea, PND, edema  Gastrointestinal:  abdominal pain , N/V, diarrhea, constipation, melena or BRBPR  Genitourinary:  Urgency, dysuria, hematuria  Muskuloskeletal :  Joint redness, swelling or acute joint pain, myalgias  Hematology:  easy bruising, nose or gum bleeding  Dermatological: rash, ulceration  Endocrine:   Polyuria or polydipsia, heat or hold intolerance  Neurological:  Headache, focal motor or sensory changes     Speech difficulties, memory loss  Psychological: depression, agitation      Objective:   VITALS:    Patient Vitals for the past 24 hrs:   Temp Pulse Resp BP SpO2   07/06/19 1145  (!) 105 15 138/71 96 %   07/06/19 1122  (!) 106 26 138/82    07/06/19 1052 98.4 °F (36.9 °C) (!) 140 16 132/73 98 %     Temp (24hrs), Av.4 °F (36.9 °C), Min:98.4 °F (36.9 °C), Max:98.4 °F (36.9 °C)      O2 Device: Room air    Wt Readings from Last 12 Encounters:   19 96.9 kg (213 lb 10 oz)   19 100.7 kg (222 lb 1.6 oz)   19 100.7 kg (222 lb)   19 105.2 kg (232 lb)   19 108 kg (238 lb)   19 108.8 kg (239 lb 14.4 oz)   19 106.3 kg (234 lb 4.8 oz)   19 104.3 kg (230 lb)   19 103.2 kg (227 lb 8 oz)   19 103 kg (227 lb)   06/10/19 107.2 kg (236 lb 6 oz)   06/10/19 107 kg (236 lb)         PHYSICAL EXAM:   General:    Alert, cooperative in no distress, not vomiting when I saw  HEENT: Normocephalic, atraumatic    PERRL, Sclera no icterus    Nasal mucosa without masses or discharge  Hearing intact to voice    Oropharynx without erythema or exudate  No thrush  Pink MM  Neck:  No meningismus, trachea midline, no carotid bruits     Thyroid not enlarged, no nodules or tenderness  Lungs:   Clear to auscultation bilaterally. No wheezing or rales    No accessory muscle use or retractions. Heart:   Regular rate and rhythm,  no murmur or gallop. No LE edema     Dorsal pedis, Posterior tibial and radial pulses 2+ and symmetric  Abdomen:   Soft, mildly tender. Not distended. Bowel sounds normal.     No masses, No Hepatosplenomegaly, No Rebound or guarding  Lymph nodes: No cervical or inguinal CHEKO  Musculoskeletal:  No Joint swelling, erythema, warmth.  No Cyanosis or clubbing  Skin:      No rashes     Not Jaundiced   No nodules or thickening  Neurologic: Alert and oriented X 3, follows commands     Cranial nerves 2 to 12 intact    Symmetric motor strength bilaterally       LAB DATA REVIEWED:    Recent Results (from the past 12 hour(s))   EKG, 12 LEAD, INITIAL    Collection Time: 19 10:59 AM   Result Value Ref Range    Ventricular Rate 123 BPM    Atrial Rate 123 BPM    P-R Interval 136 ms    QRS Duration 84 ms    Q-T Interval 316 ms    QTC Calculation (Bezet) 452 ms    Calculated P Axis 63 degrees    Calculated R Axis -22 degrees    Calculated T Axis 62 degrees    Diagnosis       Sinus tachycardia  Possible Left atrial enlargement  When compared with ECG of 12-MAY-2019 17:00,  No significant change was found     CBC WITH AUTOMATED DIFF    Collection Time: 07/06/19 11:25 AM   Result Value Ref Range    WBC 0.7 (LL) 4.1 - 11.1 K/uL    RBC 3.56 (L) 4.10 - 5.70 M/uL    HGB 11.0 (L) 12.1 - 17.0 g/dL    HCT 30.5 (L) 36.6 - 50.3 %    MCV 85.7 80.0 - 99.0 FL    MCH 30.9 26.0 - 34.0 PG    MCHC 36.1 30.0 - 36.5 g/dL    RDW 12.9 11.5 - 14.5 %    PLATELET 50 (L) 693 - 400 K/uL    MPV 11.0 8.9 - 12.9 FL    NRBC 0.0 0  WBC    ABSOLUTE NRBC 0.00 0.00 - 0.01 K/uL    NEUTROPHILS 16 (L) 32 - 75 %    BAND NEUTROPHILS 2 %    LYMPHOCYTES 62 (H) 12 - 49 %    MONOCYTES 20 (H) 5 - 13 %    EOSINOPHILS 0 0 - 7 %    BASOPHILS 0 0 - 1 %    IMMATURE GRANULOCYTES 0 0.0 - 0.5 %    TOTAL CELLS COUNTED 50      ABS. NEUTROPHILS 0.1 (L) 1.8 - 8.0 K/UL    ABS. LYMPHOCYTES 0.5 (L) 0.8 - 3.5 K/UL    ABS. MONOCYTES 0.1 0.0 - 1.0 K/UL    ABS. EOSINOPHILS 0.0 0.0 - 0.4 K/UL    ABS. BASOPHILS 0.0 0.0 - 0.1 K/UL    ABS. IMM. GRANS. 0.0 0.00 - 0.04 K/UL    DF SMEAR SCANNED      RBC COMMENTS NORMOCYTIC, NORMOCHROMIC     METABOLIC PANEL, COMPREHENSIVE    Collection Time: 07/06/19 11:25 AM   Result Value Ref Range    Sodium 134 (L) 136 - 145 mmol/L    Potassium 3.3 (L) 3.5 - 5.1 mmol/L    Chloride 98 97 - 108 mmol/L    CO2 26 21 - 32 mmol/L    Anion gap 10 5 - 15 mmol/L    Glucose 147 (H) 65 - 100 mg/dL    BUN 17 6 - 20 MG/DL    Creatinine 0.92 0.70 - 1.30 MG/DL    BUN/Creatinine ratio 18 12 - 20      GFR est AA >60 >60 ml/min/1.73m2    GFR est non-AA >60 >60 ml/min/1.73m2    Calcium 9.2 8.5 - 10.1 MG/DL    Bilirubin, total 1.0 0.2 - 1.0 MG/DL    ALT (SGPT) 29 12 - 78 U/L    AST (SGOT) 6 (L) 15 - 37 U/L    Alk.  phosphatase 64 45 - 117 U/L    Protein, total 6.9 6.4 - 8.2 g/dL    Albumin 3.5 3.5 - 5.0 g/dL    Globulin 3.4 2.0 - 4.0 g/dL    A-G Ratio 1.0 (L) 1.1 - 2.2     LIPASE    Collection Time: 07/06/19 11:25 AM   Result Value Ref Range    Lipase 34 (L) 73 - 393 U/L       EKG as read by me shows ST rate 123, normal axis, no LVH  Normal intervals, no ischemic ST-T changes    CXR read by radiology and reviewed by myself results:    CT scan read by by radiology      I saw the patient personally, took a history and did a complete physical exam at the bedside. I performed complex decision making in coming up with a diagnostic and treatment plan for the patient. I reviewed the patient's past medical records, current laboratory and radiology results, and actual Xray films/EKG. I have also discussed this case with the involved ED physician.     Care Plan discussed with:    Patient, Family, ED Doc    Risk of deterioration:  High    Total Time Coordinating Admission:  63   minutes    Total Critical Care Time:         Tammy Gracia MD

## 2019-07-06 NOTE — ROUTINE PROCESS
Bedside, Verbal and Written shift change report given to Silvina Gomez RN (oncoming nurse) by Aliyah Suarez RN (offgoing nurse). Report included the following information SBAR, Kardex, MAR and Recent Results.

## 2019-07-07 LAB
ALBUMIN SERPL-MCNC: 2.9 G/DL (ref 3.5–5)
ALBUMIN/GLOB SERPL: 1 {RATIO} (ref 1.1–2.2)
ALP SERPL-CCNC: 54 U/L (ref 45–117)
ALT SERPL-CCNC: 22 U/L (ref 12–78)
ANION GAP SERPL CALC-SCNC: 9 MMOL/L (ref 5–15)
AST SERPL-CCNC: 10 U/L (ref 15–37)
ATRIAL RATE: 123 BPM
BASOPHILS # BLD: 0 K/UL (ref 0–0.1)
BASOPHILS NFR BLD: 1 % (ref 0–1)
BILIRUB SERPL-MCNC: 0.7 MG/DL (ref 0.2–1)
BUN SERPL-MCNC: 14 MG/DL (ref 6–20)
BUN/CREAT SERPL: 21 (ref 12–20)
CALCIUM SERPL-MCNC: 8.6 MG/DL (ref 8.5–10.1)
CALCULATED P AXIS, ECG09: 63 DEGREES
CALCULATED R AXIS, ECG10: -22 DEGREES
CALCULATED T AXIS, ECG11: 62 DEGREES
CHLORIDE SERPL-SCNC: 102 MMOL/L (ref 97–108)
CO2 SERPL-SCNC: 22 MMOL/L (ref 21–32)
CREAT SERPL-MCNC: 0.67 MG/DL (ref 0.7–1.3)
DIAGNOSIS, 93000: NORMAL
DIFFERENTIAL METHOD BLD: ABNORMAL
EOSINOPHIL # BLD: 0 K/UL (ref 0–0.4)
EOSINOPHIL NFR BLD: 0 % (ref 0–7)
ERYTHROCYTE [DISTWIDTH] IN BLOOD BY AUTOMATED COUNT: 12.9 % (ref 11.5–14.5)
GLOBULIN SER CALC-MCNC: 2.9 G/DL (ref 2–4)
GLUCOSE SERPL-MCNC: 90 MG/DL (ref 65–100)
HCT VFR BLD AUTO: 25.8 % (ref 36.6–50.3)
HGB BLD-MCNC: 9.3 G/DL (ref 12.1–17)
IMM GRANULOCYTES # BLD AUTO: 0 K/UL (ref 0–0.04)
IMM GRANULOCYTES NFR BLD AUTO: 0 % (ref 0–0.5)
LYMPHOCYTES # BLD: 0.5 K/UL (ref 0.8–3.5)
LYMPHOCYTES NFR BLD: 62 % (ref 12–49)
MAGNESIUM SERPL-MCNC: 1.5 MG/DL (ref 1.6–2.4)
MCH RBC QN AUTO: 31.1 PG (ref 26–34)
MCHC RBC AUTO-ENTMCNC: 36 G/DL (ref 30–36.5)
MCV RBC AUTO: 86.3 FL (ref 80–99)
MONOCYTES # BLD: 0.2 K/UL (ref 0–1)
MONOCYTES NFR BLD: 20 % (ref 5–13)
NEUTS SEG # BLD: 0.1 K/UL (ref 1.8–8)
NEUTS SEG NFR BLD: 17 % (ref 32–75)
NRBC # BLD: 0 K/UL (ref 0–0.01)
NRBC BLD-RTO: 0 PER 100 WBC
P-R INTERVAL, ECG05: 136 MS
PLATELET # BLD AUTO: 40 K/UL (ref 150–400)
PMV BLD AUTO: 11.6 FL (ref 8.9–12.9)
POTASSIUM SERPL-SCNC: 3.6 MMOL/L (ref 3.5–5.1)
PROT SERPL-MCNC: 5.8 G/DL (ref 6.4–8.2)
Q-T INTERVAL, ECG07: 316 MS
QRS DURATION, ECG06: 84 MS
QTC CALCULATION (BEZET), ECG08: 452 MS
RBC # BLD AUTO: 2.99 M/UL (ref 4.1–5.7)
RBC MORPH BLD: ABNORMAL
SODIUM SERPL-SCNC: 133 MMOL/L (ref 136–145)
VENTRICULAR RATE, ECG03: 123 BPM
WBC # BLD AUTO: 0.8 K/UL (ref 4.1–11.1)

## 2019-07-07 PROCEDURE — 36415 COLL VENOUS BLD VENIPUNCTURE: CPT

## 2019-07-07 PROCEDURE — 83735 ASSAY OF MAGNESIUM: CPT

## 2019-07-07 PROCEDURE — 85025 COMPLETE CBC W/AUTO DIFF WBC: CPT

## 2019-07-07 PROCEDURE — 74011250636 HC RX REV CODE- 250/636: Performed by: NURSE PRACTITIONER

## 2019-07-07 PROCEDURE — 80053 COMPREHEN METABOLIC PANEL: CPT

## 2019-07-07 PROCEDURE — 74011250637 HC RX REV CODE- 250/637: Performed by: INTERNAL MEDICINE

## 2019-07-07 PROCEDURE — 74011250636 HC RX REV CODE- 250/636: Performed by: INTERNAL MEDICINE

## 2019-07-07 PROCEDURE — 99218 HC RM OBSERVATION: CPT

## 2019-07-07 RX ORDER — MAGNESIUM SULFATE HEPTAHYDRATE 40 MG/ML
2 INJECTION, SOLUTION INTRAVENOUS ONCE
Status: COMPLETED | OUTPATIENT
Start: 2019-07-07 | End: 2019-07-07

## 2019-07-07 RX ORDER — SIMETHICONE 80 MG
80 TABLET,CHEWABLE ORAL
Status: DISCONTINUED | OUTPATIENT
Start: 2019-07-07 | End: 2019-07-09 | Stop reason: HOSPADM

## 2019-07-07 RX ADMIN — POTASSIUM CHLORIDE: 2 INJECTION, SOLUTION, CONCENTRATE INTRAVENOUS at 06:40

## 2019-07-07 RX ADMIN — PROMETHAZINE HYDROCHLORIDE 50 MG: 25 TABLET ORAL at 22:35

## 2019-07-07 RX ADMIN — PANTOPRAZOLE SODIUM 40 MG: 40 TABLET, DELAYED RELEASE ORAL at 17:43

## 2019-07-07 RX ADMIN — MAGNESIUM SULFATE HEPTAHYDRATE 2 G: 40 INJECTION, SOLUTION INTRAVENOUS at 14:14

## 2019-07-07 RX ADMIN — PANTOPRAZOLE SODIUM 40 MG: 40 TABLET, DELAYED RELEASE ORAL at 10:55

## 2019-07-07 RX ADMIN — ONDANSETRON 4 MG: 2 INJECTION INTRAMUSCULAR; INTRAVENOUS at 21:31

## 2019-07-07 RX ADMIN — POTASSIUM CHLORIDE: 2 INJECTION, SOLUTION, CONCENTRATE INTRAVENOUS at 18:05

## 2019-07-07 RX ADMIN — Medication 5 ML: at 06:40

## 2019-07-07 RX ADMIN — ENOXAPARIN SODIUM 40 MG: 40 INJECTION SUBCUTANEOUS at 10:55

## 2019-07-07 NOTE — PROGRESS NOTES
Hospitalist Progress Note    NAME: Osorio Wasserman   :  1985   MRN:  [id number]       Interim Hospital Summary: 29 y.o. male whom presented on 2019 with      Assessment / Plan:  Intractable nausea vomiting x2 weeks POA  Electrolyte imbalance (Na, K, Mg)  - ABD acute series (-)     Head CT: No evidence of acute intracranial process. The evaluation for metastases is partially limited by the lack of IV contrast.  - replace electrolyte as need. Will continue to monitor    Hyponatremia secondary to dehydration; continue with IVF  - antiemetic PRN    GERD  - continue with PPI    GI cocktail was offered, but pt declined. 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 Tali Pino    No fevers currently    We will monitor fever curve in the hospital    Blood cultures and empiric antibiotics should he spike a fever     Anxiety/depression POA  - Not currently on therapy next    Tobacco abuse  - discussed about importance of smoking cessation which pt agreed     Overweight  POA Body mass index is 28.97 kg/m².     DVT prophylaxis: SCD     Code status: full code  NOK: mother    Recommended Disposition: Home w/Family     Subjective:     Chief Complaint / Reason for Physician Visit  \"I have burning sensation when I swallow\" as he points mid sternal to epigastric area. GI cocktail x 1 ordered. Discussed with RN events overnight.      Review of Systems:  Symptom Y/N Comments  Symptom Y/N Comments   Fever/Chills n   Chest Pain n    Poor Appetite    Edema     Cough    Abdominal Pain     Sputum    Joint Pain     SOB/HUSAIN n   Pruritis/Rash     Nausea/vomit n   Tolerating PT/OT     Diarrhea    Tolerating Diet     Constipation    Other       Could NOT obtain due to:      Objective:     VITALS:   Last 24hrs VS reviewed since prior progress note. Most recent are:  Patient Vitals for the past 24 hrs:   Temp Pulse Resp BP SpO2   07/07/19 0732 99.2 °F (37.3 °C) 78 18 103/58 100 %   07/06/19 2316 100.1 °F (37.8 °C) 91 18 100/59 98 %   07/06/19 1921 99.5 °F (37.5 °C) 89 18 116/61 98 %   07/06/19 1534 98.8 °F (37.1 °C) 83 22 113/69 97 %   07/06/19 1400  (!) 102 24 114/68 97 %   07/06/19 1330  (!) 118 21 141/78 98 %   07/06/19 1300  96 15 129/81 97 %   07/06/19 1230  95 13 140/73 96 %   07/06/19 1200  (!) 117 16 150/76 95 %   07/06/19 1145  (!) 105 15 138/71 96 %   07/06/19 1130  100 13 142/77 95 %   07/06/19 1122  (!) 106 26 138/82    07/06/19 1052 98.4 °F (36.9 °C) (!) 140 16 132/73 98 %       Intake/Output Summary (Last 24 hours) at 7/7/2019 0815  Last data filed at 7/7/2019 0640  Gross per 24 hour   Intake 2301.67 ml   Output    Net 2301.67 ml        PHYSICAL EXAM:  General: WD, WN. Alert, cooperative, no acute distress    EENT:  EOMI. Anicteric sclerae. MMM  Resp:  CTA bilaterally, no wheezing or rales. No accessory muscle use  CV:  Regular  rhythm,  No edema  GI:  Soft, Non distended, Non tender.  +Bowel sounds  Neurologic:  Alert and oriented X 3, normal speech,   Psych:   Good insight. Not anxious nor agitated  Skin:  No rashes. No jaundice    Reviewed most current lab test results and cultures  YES  Reviewed most current radiology test results   YES  Review and summation of old records today    NO  Reviewed patient's current orders and MAR    YES  PMH/SH reviewed - no change compared to H&P  ________________________________________________________________________  Care Plan discussed with:    Comments   Patient y    Family      RN y    Care Manager     Consultant                        Multidiciplinary team rounds were held today with , nursing, pharmacist and clinical coordinator. Patient's plan of care was discussed; medications were reviewed and discharge planning was addressed. ________________________________________________________________________  Total NON critical care TIME: 35   Minutes    Total CRITICAL CARE TIME Spent:   Minutes non procedure based      Comments   >50% of visit spent in counseling and coordination of care     ________________________________________________________________________  Kristian Granda NP     Procedures: see electronic medical records for all procedures/Xrays and details which were not copied into this note but were reviewed prior to creation of Plan. LABS:  I reviewed today's most current labs and imaging studies.   Pertinent labs include:  Recent Labs     07/07/19  0643 07/06/19  1125   WBC 0.8* 0.7*   HGB 9.3* 11.0*   HCT 25.8* 30.5*   PLT 40* 50*     Recent Labs     07/07/19  0643 07/06/19  1125   * 134*   K 3.6 3.3*    98   CO2 22 26   GLU 90 147*   BUN 14 17   CREA 0.67* 0.92   CA 8.6 9.2   MG 1.5*  --    ALB 2.9* 3.5   TBILI 0.7 1.0   SGOT 10* 6*   ALT 22 29       Signed: )Clint Washington NP

## 2019-07-07 NOTE — PROGRESS NOTES
[address], [address] [place] [address] Juan M Cunha, [address] [facility]  [phone]       Oncology CONSULT        Patient: Ximena Soliman MRN: [id number]  SSN: [id number]    YOB: 1985  Age: 29 y.o. Sex: male        Diagnosis:      1. 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 Right sided radical orchiectomy  2. Systemic chemotherapy,   BEP, cycle 2 day 3    HPI:      Ximena Soliman is seen today for hospital consult for N/V/ cytopenias post chemo for testicular cancer. Pt of Dr Melba Campuzano. No fevers. Admitted via ER. Due for chemo again soon as outpt. Pt is in bed. C/o weakness/ fatigue overall. Platelets 05T and . N/V maybe a little better. Last office note from Dr Melba Campuzano for record:  a 29 y.o. male male who noted a swelling in the right testicles for over 6 months. The swelling was associated with a dragging sensation. The patient then was seen by Dr. Gerardo Rodriguez. He underwent a right radical orchiectomy on 03/27/2019. The pathology shows T1c disease. A CT scan was done on 04/03 which reveals metastatic disease. Mr. Tutu Duran is here today receiving BEP. He was hospitalized after his last cycle of treatment with neutropenic fever. He is feeling well and does not verbalize any new complaints. He is currently not working. Review of Systems:  Per HPI      Past Medical History:   Diagnosis Date    Anxiety disorder     Depression     Suicidal thoughts     Testicle cancer ([facility] [address]      Past Surgical History:   Procedure Laterality Date    HX ORTHOPAEDIC      lft knee surgery    HX ORTHOPAEDIC      right shoulder/right knee    HX OTHER SURGICAL      testiclwe removed    IR INSERT TUNL CVC W PORT OVER 5 YEARS  4/18/2019      History reviewed. No pertinent family history.   Social History     Tobacco Use    Smoking status: Current Every Day Smoker     Packs/day: 0.50    Smokeless tobacco: Former User   Substance Use Topics    Alcohol use: Yes     Comment: per pt \"once a weekend 6 or 7 beers\"      Prior to Admission medications    Medication Sig Start Date End Date Taking? Authorizing Provider   acetaminophen (TYLENOL EXTRA STRENGTH) 500 mg tablet Take 1,000 mg by mouth every six (6) hours as needed for Pain. Yes Attial, MD Janice   diphenhydrAMINE (BENADRYL) 25 mg capsule Take 25 mg by mouth every six (6) hours as needed for Itching or Skin Irritation. Yes Attila, MD Janice   bleomycin (BLENOXANE) 30 unit injection 30 Units by IntraVENous route every seven (7) days. Yes Provider, Lluvia   promethazine (PHENERGAN) 25 mg tablet Take 1 Tab by mouth every six (6) hours as needed for Nausea. 7/1/19  Yes Janae Sanders NP   lidocaine-prilocaine (EMLA) topical cream Apply  to affected area as needed for Pain. 4/8/19  Yes Kelsie CHEEK NP              No Known Allergies        Objective:     Vitals:    07/06/19 1534 07/06/19 1921 07/06/19 2316 07/07/19 0732   BP: 113/69 116/61 100/59 103/58   Pulse: 83 89 91 78   Resp: 22 18 18 18   Temp: 98.8 °F (37.1 °C) 99.5 °F (37.5 °C) 100.1 °F (37.8 °C) 99.2 °F (37.3 °C)   SpO2: 97% 98% 98% 100%   Weight:       Height:                Physical Exam:    GENERAL: alert, cooperative, no distress  EYE: conjunctivae/corneas clear. NECK: supple  LUNG: clear to auscultation bilaterally  HEART: regular rate and rhythm  ABDOMEN: soft, non-tender. EXTREMITIES:  extremities normal, atraumatic, no cyanosis or edema  SKIN: extensive tattoo on the torso and arms  NEUROLOGIC: nonfocal  Can ambulate      CT Results (most recent):  Results from Hospital Encounter encounter on 07/06/19   CT HEAD WO CONT    Narrative EXAM: CT HEAD WO CONT    INDICATION: intractable vomiting, assess for metastatic disease to the brain    COMPARISON: 7/13/2013. CONTRAST: None.     TECHNIQUE: Unenhanced CT of the head was performed using 5 mm images. Brain and  bone windows were generated. CT dose reduction was achieved through use of a  standardized protocol tailored for this examination and automatic exposure  control for dose modulation. FINDINGS:  The ventricles and sulci are normal in size, shape and configuration and  midline. There is no significant white matter disease. There is no intracranial  hemorrhage, extra-axial collection, mass, mass effect or midline shift. The  basilar cisterns are open. No acute infarct is identified. The bone windows  demonstrate no abnormalities. The visualized portions of the paranasal sinuses  and mastoid air cells are clear. Impression IMPRESSION: No evidence of acute intracranial process. The evaluation for  metastases is partially limited by the lack of IV contrast.           Lab Results   Component Value Date/Time    WBC 0.8 (LL) 07/07/2019 06:43 AM    HGB 9.3 (L) 07/07/2019 06:43 AM    HCT 25.8 (L) 07/07/2019 06:43 AM    PLATELET 40 (LL) 01/07/0052 06:43 AM    MCV 86.3 07/07/2019 06:43 AM       Lab Results   Component Value Date/Time    Sodium 133 (L) 07/07/2019 06:43 AM    Potassium 3.6 07/07/2019 06:43 AM    Chloride 102 07/07/2019 06:43 AM    CO2 22 07/07/2019 06:43 AM    Anion gap 9 07/07/2019 06:43 AM    Glucose 90 07/07/2019 06:43 AM    BUN 14 07/07/2019 06:43 AM    Creatinine 0.67 (L) 07/07/2019 06:43 AM    BUN/Creatinine ratio 21 (H) 07/07/2019 06:43 AM    GFR est AA >60 07/07/2019 06:43 AM    GFR est non-AA >60 07/07/2019 06:43 AM    Calcium 8.6 07/07/2019 06:43 AM    Bilirubin, total 0.7 07/07/2019 06:43 AM    AST (SGOT) 10 (L) 07/07/2019 06:43 AM    Alk. phosphatase 54 07/07/2019 06:43 AM    Protein, total 5.8 (L) 07/07/2019 06:43 AM    Albumin 2.9 (L) 07/07/2019 06:43 AM    Globulin 2.9 07/07/2019 06:43 AM    A-G Ratio 1.0 (L) 07/07/2019 06:43 AM    ALT (SGPT) 22 07/07/2019 06:43 AM           Assessment/PLAN:     1.  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 Receiving systemic chemotherapy BEP cycle 2 done  IVF/ antiemetics. No fevers. Monitoring counts. May keep pt another day for monitoring as counts still going down. 2) pancytopenia due to chemo. No transfusions needed. Monitor. 3) N/V persistent. Better this am.   CT head negative. Could do brain MRI for complete eval.   Continue meds prn.     4) psychosocial.  Mood ok. . Tired from chemo. No family present at my visit.  support. Case d/w ER   we will follow   Call if questions.        Signed by: Toy Otero DO                     July 7, 2019

## 2019-07-08 ENCOUNTER — TELEPHONE (OUTPATIENT)
Dept: ONCOLOGY | Age: 34
End: 2019-07-08

## 2019-07-08 PROBLEM — D70.9 NEUTROPENIA (HCC): Status: ACTIVE | Noted: 2019-07-08

## 2019-07-08 LAB
ANION GAP SERPL CALC-SCNC: 7 MMOL/L (ref 5–15)
BASOPHILS # BLD: 0 K/UL (ref 0–0.1)
BASOPHILS NFR BLD: 0 % (ref 0–1)
BUN SERPL-MCNC: 10 MG/DL (ref 6–20)
BUN/CREAT SERPL: 15 (ref 12–20)
CALCIUM SERPL-MCNC: 8.4 MG/DL (ref 8.5–10.1)
CHLORIDE SERPL-SCNC: 105 MMOL/L (ref 97–108)
CO2 SERPL-SCNC: 22 MMOL/L (ref 21–32)
CREAT SERPL-MCNC: 0.65 MG/DL (ref 0.7–1.3)
DIFFERENTIAL METHOD BLD: ABNORMAL
EOSINOPHIL # BLD: 0 K/UL (ref 0–0.4)
EOSINOPHIL NFR BLD: 0 % (ref 0–7)
ERYTHROCYTE [DISTWIDTH] IN BLOOD BY AUTOMATED COUNT: 12.7 % (ref 11.5–14.5)
GLUCOSE SERPL-MCNC: 83 MG/DL (ref 65–100)
HCT VFR BLD AUTO: 24.7 % (ref 36.6–50.3)
HGB BLD-MCNC: 8.9 G/DL (ref 12.1–17)
IMM GRANULOCYTES # BLD AUTO: 0 K/UL (ref 0–0.04)
IMM GRANULOCYTES NFR BLD AUTO: 0 % (ref 0–0.5)
LYMPHOCYTES # BLD: 0.4 K/UL (ref 0.8–3.5)
LYMPHOCYTES NFR BLD: 58 % (ref 12–49)
MAGNESIUM SERPL-MCNC: 1.7 MG/DL (ref 1.6–2.4)
MCH RBC QN AUTO: 31 PG (ref 26–34)
MCHC RBC AUTO-ENTMCNC: 36 G/DL (ref 30–36.5)
MCV RBC AUTO: 86.1 FL (ref 80–99)
MONOCYTES # BLD: 0.2 K/UL (ref 0–1)
MONOCYTES NFR BLD: 30 % (ref 5–13)
NEUTS SEG # BLD: 0.1 K/UL (ref 1.8–8)
NEUTS SEG NFR BLD: 12 % (ref 32–75)
NRBC # BLD: 0 K/UL (ref 0–0.01)
NRBC BLD-RTO: 0 PER 100 WBC
PLATELET # BLD AUTO: 44 K/UL (ref 150–400)
PMV BLD AUTO: 11.1 FL (ref 8.9–12.9)
POTASSIUM SERPL-SCNC: 4 MMOL/L (ref 3.5–5.1)
RBC # BLD AUTO: 2.87 M/UL (ref 4.1–5.7)
RBC MORPH BLD: ABNORMAL
SODIUM SERPL-SCNC: 134 MMOL/L (ref 136–145)
WBC # BLD AUTO: 0.7 K/UL (ref 4.1–11.1)

## 2019-07-08 PROCEDURE — 94760 N-INVAS EAR/PLS OXIMETRY 1: CPT

## 2019-07-08 PROCEDURE — 74011250636 HC RX REV CODE- 250/636: Performed by: INTERNAL MEDICINE

## 2019-07-08 PROCEDURE — 99218 HC RM OBSERVATION: CPT

## 2019-07-08 PROCEDURE — 83735 ASSAY OF MAGNESIUM: CPT

## 2019-07-08 PROCEDURE — 80048 BASIC METABOLIC PNL TOTAL CA: CPT

## 2019-07-08 PROCEDURE — 85025 COMPLETE CBC W/AUTO DIFF WBC: CPT

## 2019-07-08 PROCEDURE — 74011250636 HC RX REV CODE- 250/636: Performed by: HOSPITALIST

## 2019-07-08 PROCEDURE — 65270000015 HC RM PRIVATE ONCOLOGY

## 2019-07-08 PROCEDURE — 74011000258 HC RX REV CODE- 258: Performed by: HOSPITALIST

## 2019-07-08 PROCEDURE — 74011250637 HC RX REV CODE- 250/637: Performed by: INTERNAL MEDICINE

## 2019-07-08 PROCEDURE — 36415 COLL VENOUS BLD VENIPUNCTURE: CPT

## 2019-07-08 RX ORDER — POTASSIUM CHLORIDE AND SODIUM CHLORIDE 900; 300 MG/100ML; MG/100ML
INJECTION, SOLUTION INTRAVENOUS CONTINUOUS
Status: DISCONTINUED | OUTPATIENT
Start: 2019-07-08 | End: 2019-07-09 | Stop reason: HOSPADM

## 2019-07-08 RX ADMIN — PROMETHAZINE HYDROCHLORIDE 12.5 MG: 25 INJECTION, SOLUTION INTRAMUSCULAR; INTRAVENOUS at 22:23

## 2019-07-08 RX ADMIN — PANTOPRAZOLE SODIUM 40 MG: 40 TABLET, DELAYED RELEASE ORAL at 16:24

## 2019-07-08 RX ADMIN — POTASSIUM CHLORIDE AND SODIUM CHLORIDE: 900; 300 INJECTION, SOLUTION INTRAVENOUS at 09:57

## 2019-07-08 RX ADMIN — PROMETHAZINE HYDROCHLORIDE 50 MG: 25 TABLET ORAL at 12:29

## 2019-07-08 RX ADMIN — PROMETHAZINE HYDROCHLORIDE 50 MG: 25 TABLET ORAL at 20:36

## 2019-07-08 RX ADMIN — POTASSIUM CHLORIDE: 2 INJECTION, SOLUTION, CONCENTRATE INTRAVENOUS at 05:23

## 2019-07-08 RX ADMIN — Medication 10 ML: at 13:14

## 2019-07-08 RX ADMIN — PROMETHAZINE HYDROCHLORIDE 12.5 MG: 25 INJECTION INTRAMUSCULAR; INTRAVENOUS at 00:40

## 2019-07-08 RX ADMIN — PANTOPRAZOLE SODIUM 40 MG: 40 TABLET, DELAYED RELEASE ORAL at 08:45

## 2019-07-08 RX ADMIN — ONDANSETRON 4 MG: 2 INJECTION INTRAMUSCULAR; INTRAVENOUS at 20:36

## 2019-07-08 RX ADMIN — ONDANSETRON 4 MG: 2 INJECTION INTRAMUSCULAR; INTRAVENOUS at 18:33

## 2019-07-08 NOTE — PROGRESS NOTES
2001 [facility]  200 [facility] [address] [place] [address] Juan M Cunha, 200 [facility]  [phone]       Progress Note      Patient: Jennie Dewitt MRN: [id number]  SSN: [id number]    YOB: 1985  Age: 29 y.o. Sex: male        Diagnosis:      1. 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 Right sided radical orchiectomy  2. Systemic chemotherapy,   BEP, s/p 3 cycles    HPI:      Jennie Dewitt is seen today for hospital consult for N/V/ cytopenias post chemo for testicular cancer. Pt of Dr Murali Steiner. No fevers. Admitted via ER. Due for chemo again soon as outpt. Pt is in bed. C/o weakness/ fatigue overall. Platelets 48Q and . N/V maybe a little better. Last office note from Dr Murali Steiner for record:  a 29 y.o. male male who noted a swelling in the right testicles for over 6 months. The swelling was associated with a dragging sensation. The patient then was seen by Dr. Charity Comer. He underwent a right radical orchiectomy on 03/27/2019. The pathology shows T1c disease. A CT scan was done on 04/03 which reveals metastatic disease. Mr. Alex Ramos is here today receiving BEP. He was hospitalized after his last cycle of treatment with neutropenic fever. He is feeling well and does not verbalize any new complaints. He is currently not working. Interval History: Mr. Alex Ramos states that he feels better today. Still has some nausea/vomiting. Afebrile today. Blood counts are stable.       Review of Systems:  Per HPI      Past Medical History:   Diagnosis Date    Anxiety disorder     Depression     Suicidal thoughts     Testicle cancer ([address]      Past Surgical History:   Procedure Laterality Date    HX ORTHOPAEDIC      lft knee surgery    HX ORTHOPAEDIC      right shoulder/right knee    HX OTHER SURGICAL      testiclwe removed    IR INSERT JANKIL CVC W PORT OVER 5 YEARS  4/18/2019      History reviewed. No pertinent family history. Social History     Tobacco Use    Smoking status: Current Every Day Smoker     Packs/day: 0.50    Smokeless tobacco: Former User   Substance Use Topics    Alcohol use: Yes     Comment: per pt \"once a weekend 6 or 7 beers\"      Prior to Admission medications    Medication Sig Start Date End Date Taking? Authorizing Provider   acetaminophen (TYLENOL EXTRA STRENGTH) 500 mg tablet Take 1,000 mg by mouth every six (6) hours as needed for Pain. Yes Other, MD Janice   diphenhydrAMINE (BENADRYL) 25 mg capsule Take 25 mg by mouth every six (6) hours as needed for Itching or Skin Irritation. Yes Attila, MD Janice   bleomycin (BLENOXANE) 30 unit injection 30 Units by IntraVENous route every seven (7) days. Yes Provider, Lluvia   promethazine (PHENERGAN) 25 mg tablet Take 1 Tab by mouth every six (6) hours as needed for Nausea. 7/1/19  Yes Janae Sanders H, NP   lidocaine-prilocaine (EMLA) topical cream Apply  to affected area as needed for Pain. 4/8/19  Yes Monna Morning H, NP              No Known Allergies        Objective:     Vitals:    07/07/19 2235 07/08/19 0747 07/08/19 1220 07/08/19 1558   BP:  141/85 131/71 128/77   Pulse:  81 78 88   Resp:  16 16 16   Temp: 98.7 °F (37.1 °C) 98.7 °F (37.1 °C) 98.3 °F (36.8 °C) 98.9 °F (37.2 °C)   SpO2:  98% 97% 98%   Weight:       Height:                Physical Exam:    GENERAL: alert, cooperative, no distress  EYE: conjunctivae/corneas clear. NECK: supple  LUNG: clear to auscultation bilaterally  HEART: regular rate and rhythm  ABDOMEN: soft, non-tender.   EXTREMITIES:  extremities normal, atraumatic, no cyanosis or edema  SKIN: extensive tattoo on the torso and arms  NEUROLOGIC: nonfocal  Can ambulate      CT Results (most recent):  Results from Hospital Encounter encounter on 07/06/19   CT HEAD WO CONT    Narrative EXAM: CT HEAD WO CONT    INDICATION: intractable vomiting, assess for metastatic disease to the brain    COMPARISON: 7/13/2013. CONTRAST: None. TECHNIQUE: Unenhanced CT of the head was performed using 5 mm images. Brain and  bone windows were generated. CT dose reduction was achieved through use of a  standardized protocol tailored for this examination and automatic exposure  control for dose modulation. FINDINGS:  The ventricles and sulci are normal in size, shape and configuration and  midline. There is no significant white matter disease. There is no intracranial  hemorrhage, extra-axial collection, mass, mass effect or midline shift. The  basilar cisterns are open. No acute infarct is identified. The bone windows  demonstrate no abnormalities. The visualized portions of the paranasal sinuses  and mastoid air cells are clear. Impression IMPRESSION: No evidence of acute intracranial process. The evaluation for  metastases is partially limited by the lack of IV contrast.           Lab Results   Component Value Date/Time    WBC 0.7 (LL) 07/08/2019 06:20 AM    HGB 8.9 (L) 07/08/2019 06:20 AM    HCT 24.7 (L) 07/08/2019 06:20 AM    PLATELET 44 (LL) 81/91/2507 06:20 AM    MCV 86.1 07/08/2019 06:20 AM       Lab Results   Component Value Date/Time    Sodium 134 (L) 07/08/2019 06:20 AM    Potassium 4.0 07/08/2019 06:20 AM    Chloride 105 07/08/2019 06:20 AM    CO2 22 07/08/2019 06:20 AM    Anion gap 7 07/08/2019 06:20 AM    Glucose 83 07/08/2019 06:20 AM    BUN 10 07/08/2019 06:20 AM    Creatinine 0.65 (L) 07/08/2019 06:20 AM    BUN/Creatinine ratio 15 07/08/2019 06:20 AM    GFR est AA >60 07/08/2019 06:20 AM    GFR est non-AA >60 07/08/2019 06:20 AM    Calcium 8.4 (L) 07/08/2019 06:20 AM    Bilirubin, total 0.7 07/07/2019 06:43 AM    AST (SGOT) 10 (L) 07/07/2019 06:43 AM    Alk.  phosphatase 54 07/07/2019 06:43 AM    Protein, total 5.8 (L) 07/07/2019 06:43 AM    Albumin 2.9 (L) 07/07/2019 06:43 AM    Globulin 2.9 07/07/2019 06:43 AM    A-G Ratio 1.0 (L) 07/07/2019 06:43 AM    ALT (SGPT) 22 07/07/2019 06:43 AM           Assessment/PLAN:     1. 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 Receiving systemic chemotherapy BEP cycle 2 done  IVF/ antiemetics. No fevers. Monitoring counts      2) pancytopenia due to chemo. No transfusions needed. Monitor. 3) N/V persistent. Better this am.   CT head negative. Continue meds prn.     4) psychosocial.  Mood ok. . Tired from chemo. No family present at my visit. SW support.          Signed by: Zeina Howard NP                     July 8, 2019

## 2019-07-08 NOTE — PROGRESS NOTES
Oncology End of Shift Note      Bedside shift change report given to Prince RN (incoming nurse) by Jairo Lugo (outgoing nurse) on [facility]. Report included the following information SBAR, Kardex and MAR. Shift Summary:   Patient tolerated care well. Patient rested quietly. Complained of nausea twice meds given (See MAR). Patient diet advanced by dietary and MD. No complaints of pain. Education provided regarding meds. Issues for Physician to Address:       Patient on Cardiac Monitoring?     [] Yes  [x] No    Rhythm:          Shift Events      Jairo Lugo

## 2019-07-08 NOTE — CDMP QUERY
Patient admitted with \"Intractable N/V\" , noted to have \"Testicular CA\". If possible, please document in progress notes and d/c summary if you are evaluating and/or treating any of the following: 
 
 
? Severe Malnutrition, POA ? Moderate Malnutrition, POA 
? Other Explanation of clinical findings ? Clinically Undetermined (no explanation for clinical findings) The medical record reflects the following: 
 
   Risk Factors: Hx of Testicular CA. .S/p radical orchiectomy and chemo. .. C/o N/V and weakness Clinical Indicators: RD noted pt meets the following ASPEN criteria: Pt has experience severe weight loss of ~49 lbs (19% BW) in the past ~2 months. ...meets the following criteria for Severe Malnutrition: Nutritional intake of <50% of recommended intake for >5 days; Weight loss of >1-2% in 1 week, >5% in 1 month, >7.5% in 3 months, or >10% in 6 months Treatment: RD consult; NUTRITION INTERVENTIONS: Meals/Snacks: General/healthful diet   Supplements: Commercial supplement Thank you, Tg Cline [facility] 
[phone]

## 2019-07-08 NOTE — PROGRESS NOTES
Initial Nutrition Assessment:    INTERVENTIONS/RECOMMENDATIONS:   · Full liquid diet  · Trial of ensure clear and magic cup  · Please document % meals and supplements consumed in flowsheet     ASSESSMENT:   Chart reviewed, medically noted for Intractable nausea vomiting x2 weeks, Seminomatous germ cell tumor of the right testes, stage IIb, s/p right Orchiectomy and undergoing chemo. Pt has experience severe weight loss of ~49 lbs (19% BW) in the past ~2 months. Pt meets ASPEN criteria for acute severe malnutrition. Pt does not care for much on the clear liquid diet and hopefull to be advance to full liquids. I spoke with NP Rylee and she is okay with a trial of full liquids. Pt agreed to try ensure clear and magic cup. He has a smell aversion to ensure shakes. He was provided with printed material on strategies to increase kcal and protein in his diet once he returns home. He has also met with outpatient oncology RD in the past.     Meets Criteria for Acute Malnutrition   [x] Severe Malnutrition, as evidenced by:   [] Moderate muscle wasting, loss of subcutaneous fat   [x] Nutritional intake of <50% of recommended intake for >5 days   [x] Weight loss of >1-2% in 1 week, >5% in 1 month, >7.5% in 3 months, or >10% in 6 months   [] Moderate-severe edema        Past Medical History:   Diagnosis Date    Anxiety disorder     Depression     Suicidal thoughts     [address]icle [facility] ([facility]        Diet Order: Full liquids, Clear liquids  % Eaten:  No data found.   Pertinent Medications: [x]Reviewed: NS, PPI, phenergan,   Pertinent Labs: [x]Reviewed:   Food Allergies: [x]NKFA  []Other   Last BM: 7/6  Edema:   n/a     []RUE   []LUE   []RLE   []LLE      Pressure Injury:  n/a    [] Stage I   [] Stage II   [] Stage III   [] Stage IV      Wt Readings from Last 30 Encounters:   07/06/19 96.9 kg (213 lb 10 oz)   07/01/19 100.7 kg (222 lb 1.6 oz)   07/01/19 100.7 kg (222 lb)   06/28/19 105.2 kg (232 lb)   06/27/19 108 kg (238 lb) 06/26/19 108.8 kg (239 lb 14.4 oz)   06/25/19 106.3 kg (234 lb 4.8 oz)   06/24/19 104.3 kg (230 lb)   06/17/19 103.2 kg (227 lb 8 oz)   06/17/19 103 kg (227 lb)   06/10/19 107.2 kg (236 lb 6 oz)   06/10/19 107 kg (236 lb)   06/07/19 113 kg (249 lb 3.2 oz)   06/06/19 114.7 kg (252 lb 12.8 oz)   06/05/19 113.9 kg (251 lb)   06/05/19 113.4 kg (250 lb)   06/04/19 112.5 kg (248 lb 1.6 oz)   06/03/19 111 kg (244 lb 11.2 oz)   05/28/19 111.2 kg (245 lb 3.2 oz)   05/14/19 110.3 kg (243 lb 3.2 oz)   05/03/19 118.4 kg (261 lb 1.6 oz)   05/02/19 119.9 kg (264 lb 6.4 oz)   05/01/19 119 kg (262 lb 6.4 oz)   04/30/19 117.9 kg (260 lb)   04/29/19 117.5 kg (259 lb)   04/29/19 117 kg (258 lb)   04/18/19 113.4 kg (250 lb)   04/08/19 115.2 kg (254 lb)   03/25/19 114.8 kg (253 lb 1.4 oz)   06/11/15 108.2 kg (238 lb 8.6 oz)       Anthropometrics:   Height: 6' (182.9 cm) Weight: 96.9 kg (213 lb 10 oz)   IBW (%IBW):   ( ) UBW (%UBW):   (  %)   Last Weight Metrics:  Weight Loss Metrics 7/6/2019 7/1/2019 7/1/2019 6/28/2019 6/27/2019 6/26/2019 6/25/2019   Today's Wt 213 lb 10 oz 222 lb 222 lb 1.6 oz 232 lb 238 lb 239 lb 14.4 oz 234 lb 4.8 oz   BMI 28.97 kg/m2 33.75 kg/m2 33.77 kg/m2 35.28 kg/m2 36.19 kg/m2 36.48 kg/m2 35.63 kg/m2   Some encounter information is confidential and restricted. Go to Review Flowsheets activity to see all data. BMI: Body mass index is 28.97 kg/m². This BMI is indicative of:   []Underweight    []Normal    [x]Overweight    [] Obesity   [] Extreme Obesity (BMI>40)     Estimated Nutrition Needs (Based on):   2325 Kcals/day(BMR: 1900 x 1.12) , 95 g(1 g/kg) Protein  Carbohydrate:  At Least 130 g/day  Fluids: 2325 mL/day (1ml/kcal) or per primary team    NUTRITION DIAGNOSES:   Problem:  Underweight      Etiology: related to Nausea and vomiting     Signs/Symptoms: as evidenced by 49 lbs (19%) wt loss x ~2 months      NUTRITION INTERVENTIONS:  Meals/Snacks: General/healthful diet   Supplements: Commercial supplement              GOAL:   consume >50% of meals and ONS in 2-4 days    LEARNING NEEDS (Diet, Food/Nutrient-Drug Interaction):    [] None Identified   [x] Identified and Education Provided/Documented   [] Identified and Pt declined/was not appropriate     Cultureal, Sikh, OR Ethnic Dietary Needs:    [x] None Identified   [] Identified and Addressed     [x] Interdisciplinary Care Plan Reviewed/Documented    [x] Discharge Planning: small frequent meals with kcal and protein dense foods      MONITORING /EVALUATION:      Food/Nutrient Intake Outcomes:  Total energy intake  Physical Signs/Symptoms Outcomes: Weight/weight change, Electrolyte and renal profile, GI    NUTRITION RISK:    [x] High              [] Moderate           []  Low  []  Minimal/Uncompromised    PT SEEN FOR:    []  MD Consult: []Calorie Count      []Diabetic Diet Education        []Diet Education     []Electrolyte Management     []General Nutrition Management and Supplements     []Management of Tube Feeding     []TPN Recommendations    [x]  RN Referral:  [x]MST score >=2     []Enteral/Parenteral Nutrition PTA     []Pregnant: Gestational DM or Multigestation     []Pressure Ulcer/Wound Care needs        []  Low BMI  []  LOS Referral       Radha Pineda RDN  Pager [phone]  Weekend Pager [phone]

## 2019-07-08 NOTE — PHYSICIAN ADVISORY
Letter of Status Determination:  
Recommend hospitalization status upgraded from OBSERVATION  to INPATIENT  Status Pt Name:  Xavier Villatoro MR#  
ROCIO # [id number] / 
[id number] Payor: Heavenly Ta / Plan: Baldo Koenig / Product Type: Commerical /   
CSN#  [id number] Room and Hospital  1140/01  @ [facility] Hospitalization date  7/6/2019 10:54 AM  
Current Attending Physician  Osorio Reyna MD  
Principal diagnosis  Intractable nausea and vomiting [R11.2] Clinicals  29 y.o. y.o  male hospitalized with above diagnosis This pt receiving sx Rx for  persistent N/V post chemoRx for testicular germ cell tumor. It is also noted that his [address] is 100 placing him at high risk of neutropenic complications. [place] (MCG) criteria Does   apply Neutropenia. STATUS DETERMINATION  Based on documented presenting clinical data, comorbid conditions, high risk of adverse events and deterioration, it is our recommendation that the patient's status should be upgraded from OBSERVATION to INPATIENT status. The final decision of the patient's hospitalization status depends on the attending physician's judgment. Additional comments Payor: Heavenly Ta / Plan: Baldo Koenig / Product Type: Belen Dougherty /   
  
 
Gema Harris MD MPH FACP Cell: [phone] Physician Advisor [address]  
   
Cell  [phone]   
 
 
[phone] Kell Emerson

## 2019-07-08 NOTE — TELEPHONE ENCOUNTER
CONSULT    Patient: Denice Handley     : [birth year]    Referring Physician: Dr Ramirez Human:     Room #: 0024     Nurse: Raffaele Barrera    Phone: [phone]

## 2019-07-08 NOTE — PROGRESS NOTES
CM in to see patient for assessment and discharge planning - patient sleeping. Patient has been upgraded from OBS status to Inpatient per Utilization Review and review of orders by CM. CM will plan to see patient for finalization of discharge plans - CM will need to confirm family support.     Cecil Duron, RN, BSN, [address] [place] [address]     [phone]

## 2019-07-08 NOTE — PROGRESS NOTES
Oncology End of Shift Note      Bedside shift change report given to Sawyer Moe RN (incoming nurse) by Colette Bonner RN (outgoing nurse) on [facility]. Report included the following information SBAR, Kardex and MAR. Shift Summary: 2 episodes of vomiting, one time dose of IV phenergan      Issues for Physician to Address:  Have not advanced diet due to patient still vomiting      Patient on Cardiac Monitoring?     [] Yes  [x] No    Rhythm:          Shift Events        Colette Bonner RN

## 2019-07-08 NOTE — PROGRESS NOTES
Hospitalist Progress Note    NAME: Foreign Dawn   :  1985   MRN:  [id number]       Assessment / Plan:  Goal to discharge tomorrow. Patient denied N/V this AM.  Diet advanced to clear liquid. Patient requests only phenergan for nausea     Intractable nausea vomiting x2 weeks POA  Electrolyte imbalance (Na, K, Mg)  · ABD acute series (-)  · Head CT: No evidence of acute intracranial process. The evaluation for metastases is partially limited by the lack of IV contrast.  · Replace electrolyte as need. Will continue to monitor  · Hyponatremia secondary to dehydration; continue with IVF  · ; improving  · Antiemetic PRN- requests Phenergan   · Diet advanced to clear liquid  · Appreciated dietician input      GERD  · Continue with PPI  · GI cocktail was offered, but pt declined. May have Tums as need for indigestion      Neutropenia POA  Thrombocytopenia POA  Seminomatous germ cell tumor of the right testes, stage IIb 2019 POA  S/p right Orchiectomy  · Currently undergoing third cycle chemotherapy with Dr. Abimbola Majano  · No fevers currently  · We will monitor fever curve in the hospital  · Blood cultures and empiric antibiotics should he spike a fever     Anxiety/depression POA  · Not currently on therapy next     Tobacco abuse  · Discussed about importance of smoking cessation which pt agreed  · Nicotine patch ordered      Overweight  POA Body mass index is 28.97 kg/m².     DVT prophylaxis: SCD     Code status: full code  NOK: mother     Recommended Disposition: Home w/Family     Subjective:     Chief Complaint / Reason for Physician Visit  \"Im really tired this morning. \"  Discussed with RN events overnight.      Review of Systems:  Symptom Y/N Comments  Symptom Y/N Comments   Fever/Chills N   Chest Pain N    Poor Appetite    Edema     Cough    Abdominal Pain     Sputum    Joint Pain     SOB/HUSAIN    Pruritis/Rash     Nausea/vomit Y   Tolerating PT/OT     Diarrhea    Tolerating Diet Y Constipation    Other       Could NOT obtain due to:      Objective:     VITALS:   Last 24hrs VS reviewed since prior progress note. Most recent are:  Patient Vitals for the past 24 hrs:   Temp Pulse Resp BP SpO2   07/08/19 1220  78 16 131/71 97 %   07/08/19 0747 98.7 °F (37.1 °C) 81 16 141/85 98 %   07/07/19 2235 98.7 °F (37.1 °C) 89 16 121/71 100 %   07/07/19 1934 100.1 °F (37.8 °C) 78 16 137/78 100 %   07/07/19 1514 99.5 °F (37.5 °C) 83 18 112/58 97 %       Intake/Output Summary (Last 24 hours) at 7/8/2019 1414  Last data filed at 7/8/2019 0009  Gross per 24 hour   Intake 915 ml   Output 400 ml   Net 515 ml        PHYSICAL EXAM:  General:          Pleasant, obese,  male. NAD    EENT:              EOMI. Anicteric sclerae. MMM  Resp:               CTA bilaterally, no wheezing or rales. No accessory muscle use  CV:                  Regular  rhythm,  No edema  GI:                   Soft, Non distended, Non tender.  +Bowel sounds  Neurologic:      Alert and oriented X 3, normal speech,   Psych:             Good insight. Not anxious nor agitated  Skin:                No rashes. No jaundice    Reviewed most current lab test results and cultures  YES  Reviewed most current radiology test results   YES  Review and summation of old records today    NO  Reviewed patient's current orders and MAR    YES  PMH/SH reviewed - no change compared to H&P  ________________________________________________________________________  Care Plan discussed with:    Comments   Patient X    Family      RN X    Care Manager     Consultant                       X Multidiciplinary team rounds were held today with , nursing, pharmacist and clinical coordinator. Patient's plan of care was discussed; medications were reviewed and discharge planning was addressed.      ________________________________________________________________________  Total NON critical care TIME:  25   Minutes    Total CRITICAL CARE TIME Spent: Minutes non procedure based      Comments   >50% of visit spent in counseling and coordination of care     ________________________________________________________________________  Oxana Cooney NP     Procedures: see electronic medical records for all procedures/Xrays and details which were not copied into this note but were reviewed prior to creation of Plan. LABS:  I reviewed today's most current labs and imaging studies.   Pertinent labs include:  Recent Labs     07/08/19 0620 07/07/19 0643 07/06/19  1125   WBC 0.7* 0.8* 0.7*   HGB 8.9* 9.3* 11.0*   HCT 24.7* 25.8* 30.5*   PLT 44* 40* 50*     Recent Labs     07/08/19 0620 07/07/19 0643 07/06/19  1125   * 133* 134*   K 4.0 3.6 3.3*    102 98   CO2 22 22 26   GLU 83 90 147*   BUN 10 14 17   CREA 0.65* 0.67* 0.92   CA 8.4* 8.6 9.2   MG 1.7 1.5*  --    ALB  --  2.9* 3.5   TBILI  --  0.7 1.0   SGOT  --  10* 6*   ALT  --  22 29       Signed: Oxana Cooney NP

## 2019-07-09 VITALS
HEART RATE: 86 BPM | TEMPERATURE: 98.9 F | OXYGEN SATURATION: 95 % | BODY MASS INDEX: 28.93 KG/M2 | RESPIRATION RATE: 16 BRPM | WEIGHT: 213.63 LBS | SYSTOLIC BLOOD PRESSURE: 124 MMHG | DIASTOLIC BLOOD PRESSURE: 67 MMHG | HEIGHT: 72 IN

## 2019-07-09 PROBLEM — F41.9 ANXIETY AND DEPRESSION: Status: ACTIVE | Noted: 2019-07-09

## 2019-07-09 PROBLEM — E87.8 ELECTROLYTE IMBALANCE: Status: ACTIVE | Noted: 2019-07-09

## 2019-07-09 PROBLEM — F32.A ANXIETY AND DEPRESSION: Status: ACTIVE | Noted: 2019-07-09

## 2019-07-09 PROBLEM — K21.9 GERD (GASTROESOPHAGEAL REFLUX DISEASE): Status: ACTIVE | Noted: 2019-07-09

## 2019-07-09 LAB
ALBUMIN SERPL-MCNC: 2.7 G/DL (ref 3.5–5)
ALBUMIN/GLOB SERPL: 1 {RATIO} (ref 1.1–2.2)
ALP SERPL-CCNC: 52 U/L (ref 45–117)
ALT SERPL-CCNC: 20 U/L (ref 12–78)
ANION GAP SERPL CALC-SCNC: 9 MMOL/L (ref 5–15)
AST SERPL-CCNC: 7 U/L (ref 15–37)
BASOPHILS # BLD: 0 K/UL (ref 0–0.1)
BASOPHILS NFR BLD: 0 % (ref 0–1)
BILIRUB SERPL-MCNC: 0.5 MG/DL (ref 0.2–1)
BUN SERPL-MCNC: 7 MG/DL (ref 6–20)
BUN/CREAT SERPL: 10 (ref 12–20)
CALCIUM SERPL-MCNC: 8.5 MG/DL (ref 8.5–10.1)
CHLORIDE SERPL-SCNC: 105 MMOL/L (ref 97–108)
CO2 SERPL-SCNC: 20 MMOL/L (ref 21–32)
CREAT SERPL-MCNC: 0.69 MG/DL (ref 0.7–1.3)
DIFFERENTIAL METHOD BLD: ABNORMAL
EOSINOPHIL # BLD: 0 K/UL (ref 0–0.4)
EOSINOPHIL NFR BLD: 0 % (ref 0–7)
ERYTHROCYTE [DISTWIDTH] IN BLOOD BY AUTOMATED COUNT: 13 % (ref 11.5–14.5)
GLOBULIN SER CALC-MCNC: 2.8 G/DL (ref 2–4)
GLUCOSE SERPL-MCNC: 80 MG/DL (ref 65–100)
HCT VFR BLD AUTO: 24.6 % (ref 36.6–50.3)
HGB BLD-MCNC: 8.8 G/DL (ref 12.1–17)
IMM GRANULOCYTES # BLD AUTO: 0 K/UL (ref 0–0.04)
IMM GRANULOCYTES NFR BLD AUTO: 1 % (ref 0–0.5)
LYMPHOCYTES # BLD: 0.4 K/UL (ref 0.8–3.5)
LYMPHOCYTES NFR BLD: 54 % (ref 12–49)
MCH RBC QN AUTO: 31.4 PG (ref 26–34)
MCHC RBC AUTO-ENTMCNC: 35.8 G/DL (ref 30–36.5)
MCV RBC AUTO: 87.9 FL (ref 80–99)
MONOCYTES # BLD: 0.3 K/UL (ref 0–1)
MONOCYTES NFR BLD: 37 % (ref 5–13)
NEUTS SEG # BLD: 0.1 K/UL (ref 1.8–8)
NEUTS SEG NFR BLD: 8 % (ref 32–75)
NRBC # BLD: 0 K/UL (ref 0–0.01)
NRBC BLD-RTO: 0 PER 100 WBC
PLATELET # BLD AUTO: 56 K/UL (ref 150–400)
PMV BLD AUTO: 11.2 FL (ref 8.9–12.9)
POTASSIUM SERPL-SCNC: 4 MMOL/L (ref 3.5–5.1)
PROT SERPL-MCNC: 5.5 G/DL (ref 6.4–8.2)
RBC # BLD AUTO: 2.8 M/UL (ref 4.1–5.7)
RBC MORPH BLD: ABNORMAL
SODIUM SERPL-SCNC: 134 MMOL/L (ref 136–145)
WBC # BLD AUTO: 0.8 K/UL (ref 4.1–11.1)

## 2019-07-09 PROCEDURE — 85025 COMPLETE CBC W/AUTO DIFF WBC: CPT

## 2019-07-09 PROCEDURE — 74011250637 HC RX REV CODE- 250/637: Performed by: INTERNAL MEDICINE

## 2019-07-09 PROCEDURE — 74011250636 HC RX REV CODE- 250/636: Performed by: INTERNAL MEDICINE

## 2019-07-09 PROCEDURE — 36415 COLL VENOUS BLD VENIPUNCTURE: CPT

## 2019-07-09 PROCEDURE — 94760 N-INVAS EAR/PLS OXIMETRY 1: CPT

## 2019-07-09 PROCEDURE — 80053 COMPREHEN METABOLIC PANEL: CPT

## 2019-07-09 RX ORDER — PANTOPRAZOLE SODIUM 40 MG/1
40 TABLET, DELAYED RELEASE ORAL
Qty: 30 TAB | Refills: 0 | Status: SHIPPED | OUTPATIENT
Start: 2019-07-09 | End: 2020-02-26

## 2019-07-09 RX ORDER — PROMETHAZINE HYDROCHLORIDE 25 MG/1
25 TABLET ORAL
Qty: 30 TAB | Refills: 0 | Status: SHIPPED | OUTPATIENT
Start: 2019-07-09 | End: 2020-02-26

## 2019-07-09 RX ADMIN — ONDANSETRON 4 MG: 2 INJECTION INTRAMUSCULAR; INTRAVENOUS at 13:17

## 2019-07-09 RX ADMIN — ENOXAPARIN SODIUM 40 MG: 40 INJECTION SUBCUTANEOUS at 11:54

## 2019-07-09 RX ADMIN — PANTOPRAZOLE SODIUM 40 MG: 40 TABLET, DELAYED RELEASE ORAL at 08:21

## 2019-07-09 RX ADMIN — Medication 10 ML: at 13:20

## 2019-07-09 RX ADMIN — POTASSIUM CHLORIDE AND SODIUM CHLORIDE: 900; 300 INJECTION, SOLUTION INTRAVENOUS at 06:03

## 2019-07-09 NOTE — DISCHARGE SUMMARY
Hospitalist Discharge Summary     Patient ID:  Foreign Dawn  [id number]  32 y.o.  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  Status post right orchiectomy  Currently undergoing systemic chemotherapy with BEP, cycle #3   Previous nausea vomiting with his chemo, usually managed with Phenergan  Past 2 weeks it has become \"terrible\"  Recurrent nausea vomiting the past  Constant vomiting over 10 times per day  Vomits whether he eats or not  This feels significantly worse to me the prior vomiting he had with his chemo  No significant abdominal pain  Denies diarrhea, melena, bright red blood per rectum, coffee-ground emesis or hematemesis  Taking his Phenergan but now is not helping  Mild dry cough  Sub-substernal chest pain with vomiting  No fevers or chills, dysuria, skin rashes   ED because of the intractable vomiting  Tachycardic, received IV fluids  Potassium borderline low 3.3 neutropenic white blood cell count 0.7 with an ANC of 0.1  Hemoglobin 11.0  Platelet count 81,669  ED and myself offered outpatient management  Her concerns about a neutropenic patient in the hospital were discussed  However patient did not feel he can manage this as an outpatient  Dr. Lillian Alex team was consulted they ordered a head CT scan  I ordered a KUB and chest x-ray  We will admit for observation for symptom management and for oncology evaluation\"    ______________________________________________________________________  DISCHARGE SUMMARY/HOSPITAL COURSE:  for full details see H&P, daily progress notes, labs, consult notes. Intractable nausea vomiting x2 weeks POA  Electrolyte imbalance (Na, K, Mg)  · ABD acute series (-)  · Head CT: No evidence of acute intracranial process. The evaluation for metastases is partially limited by the lack of IV contrast.  · Replace electrolyte as need. Will continue to monitor  · Hyponatremia secondary to dehydration; continue with IVF  · ; improving  · Antiemetic PRN- requests Phenergan   · Diet advanced to clear liquid  · Appreciated dietician input      GERD  · Continue with PPI  · GI cocktail was offered, but pt declined. 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 Lachelle Beal  · No fevers currently  · We will monitor fever curve in the hospital  · Blood cultures and empiric antibiotics should he spike a fever     Anxiety/depression POA  · Not currently on therapy next     Tobacco abuse  · Discussed about importance of smoking cessation which pt agreed  · Nicotine patch ordered   _______________________________________________________________________  Patient seen and examined by me on discharge day. Pertinent Findings:  Gen:    Not in distress  Chest: Clear lungs  CVS:   Regular rhythm. No edema  Abd:  Soft, not distended, not tender  Neuro:  Alert, orient x 3   _______________________________________________________________________  DISCHARGE MEDICATIONS:   Current Discharge Medication List      START taking these medications    Details   pantoprazole (PROTONIX) 40 mg tablet Take 1 Tab by mouth Before breakfast and dinner. Qty: 30 Tab, Refills: 0         CONTINUE these medications which have CHANGED    Details   promethazine (PHENERGAN) 25 mg tablet Take 1 Tab by mouth every six (6) hours as needed for Nausea.   Qty: 30 Tab, Refills: 0 CONTINUE these medications which have NOT CHANGED    Details   acetaminophen (TYLENOL EXTRA STRENGTH) 500 mg tablet Take 1,000 mg by mouth every six (6) hours as needed for Pain. diphenhydrAMINE (BENADRYL) 25 mg capsule Take 25 mg by mouth every six (6) hours as needed for Itching or Skin Irritation. bleomycin (BLENOXANE) 30 unit injection 30 Units by IntraVENous route every seven (7) days. lidocaine-prilocaine (EMLA) topical cream Apply  to affected area as needed for Pain. Qty: 30 g, Refills: 0               Patient Follow Up Instructions: Activity: Activity as tolerated  Diet: Resume previous diet  Wound Care: None needed    Follow-up with PCP in 1 week.     Follow-up Information     Follow up With Specialties Details Why [address] [place] [address], [address] [place] [address], [address] [place], [place] [id number]  [phone]      Naomi Quiles MD Hematology and Oncology, Internal Medicine, Hematology, Oncology Schedule an appointment as soon as possible for a visit  [address] [place] [address] Drive  [address] [phone]          ________________________________________________________________    Risk of deterioration: Moderate    Condition at Discharge:  Stable  __________________________________________________________________    Disposition  Home with family, no needs    ____________________________________________________________________    Code Status: Full Code  ___________________________________________________________________      Total time in minutes spent coordinating this discharge (includes going over instructions, follow-up, prescriptions, and preparing report for sign off to her PCP) :  31 minutes    Signed:  Deanna Fletcher NP

## 2019-07-09 NOTE — PROGRESS NOTES
Reason for Admission:  Intractable Nausea & Vomiting and Neutropenia                     RRAT Score:   7              Plan for utilizing home health:     HH in the past.                     Current Advanced Directive/Advance Care Plan: FULL code. No Advanced Medical Directive. Primary decision maker is his mother Ana Luisa Larsen @ [phone]. Transition of Care Plan:   Pt to discharge home; and will drive himself home. Resides in a ranch style home w/four steps to enter the front door. Pt has prior HH in the past.  No prior SNF in the past.  Independent w/ADL's and drives himself to medical appointments'. Utilizes [employer] ([address]). SW to continue to assist.    Care Management Interventions  PCP Verified by CM: Yes(Sep 19th, 2017)  Mode of Transport at Discharge:  Other (see comment)(Self)  Transition of Care Consult (CM Consult): Discharge Planning  Discharge Durable Medical Equipment: (No DME or O2.  )  Current Support Network: Own Home(Lives in a ranch style  home w/four steps to enter the home.  )  Confirm Follow Up Transport: Self  Plan discussed with Pt/Family/Caregiver: Yes  Discharge Location  Discharge Placement: (Home)      Roberta Gutierrez, RACHEL  [phone]

## 2019-07-09 NOTE — PROGRESS NOTES
Patient educated on discharge instructions, medications, and follow up appointments. PIV removed prior to discharge. Patient discharged with all of his belongings, paperwork, and prescriptions. Opportunity given for questions and clarifications. Patient discharged via wheelchair to personal vehicle.

## 2019-07-09 NOTE — DISCHARGE INSTRUCTIONS
Patient Education        Dehydration: Care Instructions  Your Care Instructions  Dehydration happens when your body loses too much fluid. This might happen when you do not drink enough water or you lose large amounts of fluids from your body because of diarrhea, vomiting, or sweating. Severe dehydration can be life-threatening. Water and minerals called electrolytes help put your body fluids back in balance. Learn the early signs of fluid loss, and drink more fluids to prevent dehydration. Follow-up care is a key part of your treatment and safety. Be sure to make and go to all appointments, and call your doctor if you are having problems. It's also a good idea to know your test results and keep a list of the medicines you take. How can you care for yourself at home? · To prevent dehydration, drink plenty of fluids, enough so that your urine is light yellow or clear like water. Choose water and other caffeine-free clear liquids until you feel better. If you have kidney, heart, or liver disease and have to limit fluids, talk with your doctor before you increase the amount of fluids you drink. · If you do not feel like eating or drinking, try taking small sips of water, sports drinks, or other rehydration drinks. · Get plenty of rest.  To prevent dehydration  · Add more fluids to your diet and daily routine, unless your doctor has told you not to. · During hot weather, drink more fluids. Drink even more fluids if you exercise a lot. Stay away from drinks with alcohol or caffeine. · Watch for the symptoms of dehydration. These include:  ? A dry, sticky mouth. ? Dark yellow urine, and not much of it. ? Dry and sunken eyes. ? Feeling very tired. · Learn what problems can lead to dehydration. These include:  ? Diarrhea, fever, and vomiting. ? Any illness with a fever, such as pneumonia or the flu. ?  Activities that cause heavy sweating, such as endurance races and heavy outdoor work in hot or humid weather. ? Alcohol or drug abuse or withdrawal.  ? Certain medicines, such as cold and allergy pills (antihistamines), diet pills (diuretics), and laxatives. ? Certain diseases, such as diabetes, cancer, and heart or kidney disease. When should you call for help? Call 911 anytime you think you may need emergency care. For example, call if:    · You passed out (lost consciousness).    Call your doctor now or seek immediate medical care if:    · You are confused and cannot think clearly.     · You are dizzy or lightheaded, or you feel like you may faint.     · You have signs of needing more fluids. You have sunken eyes and a dry mouth, and you pass only a little dark urine.     · You cannot keep fluids down.    Watch closely for changes in your health, and be sure to contact your doctor if:    · You are not making tears.     · Your skin is very dry and sags slowly back into place after you pinch it.     · Your mouth and eyes are very dry. Where can you learn more? Go to http://sol-domitila.info/. Enter N348 in the search box to learn more about \"Dehydration: Care Instructions. \"  Current as of: 2018  Content Version: 11.9  © 1334-8613 [employer]. Care instructions adapted under license by [employer] (which disclaims liability or warranty for this information). If you have questions about a medical condition or this instruction, always ask your healthcare professional. [place] [address] any warranty or liability for your use of this information.                HOSPITALIST DISCHARGE INSTRUCTIONS    NAME: Rima Talbert   :  1985   MRN:  [id number]     Date/Time:  2019 1:36 PM    ADMIT DATE: 2019   DISCHARGE DATE: 2019     Attending Physician: Matt Todd NP    DISCHARGE DIAGNOSIS:    Intractable N/V- resolved  Electrolyte imbalance- resolved  GERD  Neutropenia, POA  Thrombocytopenia, POA  Testicular Cancer  Anxiety/Depression       Medications: Per above medication reconciliation. Pain Management: per above medications    Recommended diet: Regular Diet    Recommended activity: Activity as tolerated    Wound care: None    Indwelling devices:  None    Supplemental Oxygen: None    Code status: Full        Outside physician follow up: Follow-up Information     Follow up With Specialties Details Why [address] [place] [address], [address] [place] [address]  [phone]      Rani Ocampo MD Hematology and Oncology, Internal Medicine, Hematology, Oncology Schedule an appointment as soon as possible for a visit  [address] [place] [address]  [address]  [facility]. [phone]            Information obtained by :  I understand that if any problems occur once I am at home I am to contact my physician. I understand and acknowledge receipt of the instructions indicated above.                                                                                                                                            Physician's or R.N.'s Signature                                                                  Date/Time                                                                                                                                              Patient or Repres

## 2019-07-09 NOTE — PROGRESS NOTES
Problem: Risk for Spread of Infection  Goal: Prevent transmission of infectious organism to others  Description  Prevent the transmission of infectious organisms to other patients, staff members, and visitors. Outcome: Progressing Towards Goal     Problem: Nausea/Vomiting (Adult)  Goal: *Absence of nausea/vomiting  Outcome: Progressing Towards Goal     Problem: Falls - Risk of  Goal: *Absence of Falls  Description  Document Milta Raring Fall Risk and appropriate interventions in the flowsheet.   Outcome: Progressing Towards Goal

## 2019-07-09 NOTE — PROGRESS NOTES
Oncology End of Shift Note      Bedside shift change report given to Maria Ritter RN (incoming nurse) by Obed Whitfield RN (outgoing nurse) on Ofelia Amilcaro. Report included the following information SBAR, Kardex and MAR. Shift Summary: Pt vomited twice, got an order for iv phenergan which helps      Issues for Physician to Address:  Can we get order for prn phenergan     Patient on Cardiac Monitoring?     [] Yes  [x] No    Rhythm:          Shift Events        Obed Whitfield RN

## 2019-07-09 NOTE — PROGRESS NOTES
Problem: Risk for Spread of Infection  Goal: Prevent transmission of infectious organism to others  Description  Prevent the transmission of infectious organisms to other patients, staff members, and visitors. Outcome: Resolved/Met     Problem: Patient Education:  Go to Education Activity  Goal: Patient/Family Education  Outcome: Resolved/Met     Problem: Nausea/Vomiting (Adult)  Goal: *Absence of nausea/vomiting  Outcome: Resolved/Met  Goal: *Palliation of nausea/vomiting (Palliative Care)  Outcome: Resolved/Met     Problem: Patient Education: Go to Patient Education Activity  Goal: Patient/Family Education  Outcome: Resolved/Met     Problem: Falls - Risk of  Goal: *Absence of Falls  Description  Document Brianna Fall Risk and appropriate interventions in the flowsheet.   Outcome: Resolved/Met     Problem: Patient Education: Go to Patient Education Activity  Goal: Patient/Family Education  Outcome: Resolved/Met

## 2019-07-12 ENCOUNTER — OFFICE VISIT (OUTPATIENT)
Dept: ONCOLOGY | Age: 34
End: 2019-07-12

## 2019-07-12 VITALS
WEIGHT: 214 LBS | DIASTOLIC BLOOD PRESSURE: 71 MMHG | HEIGHT: 72 IN | TEMPERATURE: 97.8 F | BODY MASS INDEX: 28.99 KG/M2 | RESPIRATION RATE: 16 BRPM | HEART RATE: 114 BPM | SYSTOLIC BLOOD PRESSURE: 98 MMHG | OXYGEN SATURATION: 96 %

## 2019-07-12 DIAGNOSIS — C62.11 MALIGNANT NEOPLASM OF DESCENDED RIGHT TESTIS (HCC): Primary | ICD-10-CM

## 2019-07-12 DIAGNOSIS — R11.2 CINV (CHEMOTHERAPY-INDUCED NAUSEA AND VOMITING): ICD-10-CM

## 2019-07-12 DIAGNOSIS — T45.1X5A CINV (CHEMOTHERAPY-INDUCED NAUSEA AND VOMITING): ICD-10-CM

## 2019-07-12 RX ORDER — SODIUM CHLORIDE 0.9 % (FLUSH) 0.9 %
5-10 SYRINGE (ML) INJECTION AS NEEDED
Status: CANCELLED | OUTPATIENT
Start: 2019-08-08

## 2019-07-12 RX ORDER — HEPARIN 100 UNIT/ML
500 SYRINGE INTRAVENOUS AS NEEDED
Status: CANCELLED | OUTPATIENT
Start: 2019-08-08

## 2019-07-12 RX ORDER — SODIUM CHLORIDE 9 MG/ML
10 INJECTION INTRAMUSCULAR; INTRAVENOUS; SUBCUTANEOUS AS NEEDED
Status: CANCELLED | OUTPATIENT
Start: 2019-08-08

## 2019-07-12 NOTE — PROGRESS NOTES
Madeline Reyes is a 29 y.o. male here today for Testicular carcinoma f/u. Elevated pulse noted; No distress noted; other VS stable. Patient denies pain. Good appetite. Patient denies N/V/D and constipation at this time. Patient reports he has nausea at times; pt reports the nausea is worse when he takes the promethazine. Patient denies numbness and tingling. Patient denies mouth ulcers. Patient denies cough. Patient denies SOB. Patient denies falls. Visit Vitals  BP 98/71 (BP 1 Location: Left arm, BP Patient Position: Sitting)   Pulse (!) 114   Temp 97.8 °F (36.6 °C) (Oral)   Resp 16   Ht 6' (1.829 m)   Wt 214 lb (97.1 kg)   SpO2 96%   BMI 29.02 kg/m²       Pain Scale: 0 - No pain/10  Pain Location:     1. Have you been to the ER, urgent care clinic since your last visit? Hospitalized since your last visit? No    2. Have you seen or consulted any other health care providers outside of the [address] [place] [address] since your last visit? Include any pap smears or colon screening. No     Health Maintenance Review: Patient reminded of \"due or due soon\" health maintenance. I have asked the patient to contact his/her primary care provider (PCP) for follow-up on his/her health maintenance.

## 2019-07-12 NOTE — PROGRESS NOTES
[facility]  [address], [address] [place] [address] Ne, [address] [facility]  [phone]       Oncology progress note        Patient: Benny Dvais MRN: [id number]  SSN: [id number]    YOB: 1985  Age: 29 y.o. Sex: male        Diagnosis:      1. 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 Right sided radical orchiectomy  2. Systemic chemotherapy,   BEP - s/p 3 cycles    Subjective:      Benny Davis is a 29 y.o. male male who noted a swelling in the right testicles for over 6 months. The swelling was associated with a dragging sensation. The patient then was seen by Dr. Keven Adler. He underwent a right radical orchiectomy on 03/27/2019. The pathology shows T1c disease. A CT scan was done on 04/03 which reveals metastatic disease. Mr. Elda Melo received 3 cycles  of BEP. He was hospitalized after his first cycle of treatment with neutropenic fever. He has been having nausea and vomiting, but phenergan seems to help. He is on a number of PRN meds. He was admitted to the hospital with CINV. This has improved. Since discontinuing his treatments, he said the nausea has improved. He has elected not to received the his last dose of Bleomycin.       Review of Systems:    Constitutional: negative  Eyes: negative  Ears, Nose, Mouth, Throat, and Face: negative  Respiratory: negative  Cardiovascular: negative  Gastrointestinal: nausea/vomiting  Genitourinary:negative  Integument/Breast: negative  Hematologic/Lymphatic: negative  Musculoskeletal:negative  Neurological: negative      Past Medical History:   Diagnosis Date    Anxiety disorder     Depression     Suicidal thoughts     Testicle cancer ([facility] [address]      Past Surgical History:   Procedure Laterality Date    HX ORTHOPAEDIC      lft knee surgery    HX ORTHOPAEDIC      right shoulder/right knee  HX OTHER SURGICAL      testiclwe removed    IR INSERT TUNL CVC W PORT OVER 5 YEARS  4/18/2019      No family history on file. Social History     Tobacco Use    Smoking status: Current Every Day Smoker     Packs/day: 0.50    Smokeless tobacco: Former User   Substance Use Topics    Alcohol use: Yes     Comment: per pt \"once a weekend 6 or 7 beers\"      Prior to Admission medications    Medication Sig Start Date End Date Taking? Authorizing Provider   pantoprazole (PROTONIX) 40 mg tablet Take 1 Tab by mouth Before breakfast and dinner. 7/9/19  Yes Shelley Mckee NP   promethazine (PHENERGAN) 25 mg tablet Take 1 Tab by mouth every six (6) hours as needed for Nausea. 7/9/19  Yes Shelley Mckee NP   acetaminophen (TYLENOL EXTRA STRENGTH) 500 mg tablet Take 1,000 mg by mouth every six (6) hours as needed for Pain. Yes Other, MD Janice   diphenhydrAMINE (BENADRYL) 25 mg capsule Take 25 mg by mouth every six (6) hours as needed for Itching or Skin Irritation. Yes Attila, MD Janice   bleomycin (BLENOXANE) 30 unit injection 30 Units by IntraVENous route every seven (7) days. Yes Provider, Lluvia   lidocaine-prilocaine (EMLA) topical cream Apply  to affected area as needed for Pain. 4/8/19  Yes Randi Miller NP          No Known Allergies        Objective:     Visit Vitals  BP 98/71 (BP 1 Location: Left arm, BP Patient Position: Sitting)   Pulse (!) 114   Temp 97.8 °F (36.6 °C) (Oral)   Resp 16   Ht 6' (1.829 m)   Wt 214 lb (97.1 kg)   SpO2 96%   BMI 29.02 kg/m²     Pain Scale: 0 - No pain/10      Physical Exam:    GENERAL: alert, cooperative, no distress, appears stated age  EYE: conjunctivae/corneas clear. PERRL, EOM's intact. Fundi benign  LYMPHATIC: Cervical, supraclavicular, and axillary nodes normal.   THROAT & NECK: normal and no erythema or exudates noted.    LUNG: clear to auscultation bilaterally  HEART: regular rate and rhythm, S1, S2 normal, no murmur, click, rub or gallop  ABDOMEN: soft, non-tender. Bowel sounds normal. No masses,  no organomegaly  EXTREMITIES:  extremities normal, atraumatic, no cyanosis or edema  SKIN: extensive tattoo on the torso and arms  NEUROLOGIC: AOx3. Gait normal. Reflexes and motor strength normal and symmetric. Cranial nerves 2-12 and sensation grossly intact. CT Results (most recent):  Results from Hospital Encounter encounter on 07/06/19   CT HEAD WO CONT    Narrative EXAM: CT HEAD WO CONT    INDICATION: intractable vomiting, assess for metastatic disease to the brain    COMPARISON: 7/13/2013. CONTRAST: None. TECHNIQUE: Unenhanced CT of the head was performed using 5 mm images. Brain and  bone windows were generated. CT dose reduction was achieved through use of a  standardized protocol tailored for this examination and automatic exposure  control for dose modulation. FINDINGS:  The ventricles and sulci are normal in size, shape and configuration and  midline. There is no significant white matter disease. There is no intracranial  hemorrhage, extra-axial collection, mass, mass effect or midline shift. The  basilar cisterns are open. No acute infarct is identified. The bone windows  demonstrate no abnormalities. The visualized portions of the paranasal sinuses  and mastoid air cells are clear. Impression IMPRESSION: No evidence of acute intracranial process.  The evaluation for  metastases is partially limited by the lack of IV contrast.           Lab Results   Component Value Date/Time    WBC 0.8 (LL) 07/09/2019 08:34 AM    HGB 8.8 (L) 07/09/2019 08:34 AM    HCT 24.6 (L) 07/09/2019 08:34 AM    PLATELET 56 (L) 98/74/8659 08:34 AM    MCV 87.9 07/09/2019 08:34 AM       Lab Results   Component Value Date/Time    Sodium 134 (L) 07/09/2019 08:34 AM    Potassium 4.0 07/09/2019 08:34 AM    Chloride 105 07/09/2019 08:34 AM    CO2 20 (L) 07/09/2019 08:34 AM    Anion gap 9 07/09/2019 08:34 AM    Glucose 80 07/09/2019 08:34 AM    BUN 7 07/09/2019 08:34 AM    Creatinine 0.69 (L) 07/09/2019 08:34 AM    BUN/Creatinine ratio 10 (L) 07/09/2019 08:34 AM    GFR est AA >60 07/09/2019 08:34 AM    GFR est non-AA >60 07/09/2019 08:34 AM    Calcium 8.5 07/09/2019 08:34 AM    Bilirubin, total 0.5 07/09/2019 08:34 AM    AST (SGOT) 7 (L) 07/09/2019 08:34 AM    Alk. phosphatase 52 07/09/2019 08:34 AM    Protein, total 5.5 (L) 07/09/2019 08:34 AM    Albumin 2.7 (L) 07/09/2019 08:34 AM    Globulin 2.8 07/09/2019 08:34 AM    A-G Ratio 1.0 (L) 07/09/2019 08:34 AM    ALT (SGPT) 20 07/09/2019 08:34 AM       Assessment:     1. Testicular carcinoma       Seminomatous germ cell tumor of the testis        T1b N2 S0 (stage IIB)    ECOG PS 0  Intent of Treatment - curative  Prognosis - excellent    S/P right sided radical orchiectomy  Tumor marker : normal    Normal PFT - 4/20/2019    Receiving systemic chemotherapy    BEP - s/p 3 cycles    + nausea/vomiting  His symptoms are out of proportion to the expected side effects of the treatment and also worse than he appears. Patient elected not to proceed with his last dose of Bleomycin    Repeat CT abd  Tumor marker in 1 month      2. Nausea/vomiting, CINV - improved since not receiving treatment    > Aloxi and dexamethasone on treatment days  > Zofran and compazine not effective  > phenergan 25 mg q6hr as needed  > dexamethasone 8 mg po BID x 5 days following chemotherapy  > olanzapine 10 mg po daily starting the day of chemotherapy x 4 days - patient refused      Plan:       · Repeat labs in 1 month  · Repeat CT scans scheduled for 7/16  · Port flush 1 month  · Return for follow up in 3 months      Signed by: Rocky Powell MD                     July 12, 2019        CC. Tayler Gil MD  CC.  Caryle Knock, MD

## 2019-07-15 ENCOUNTER — APPOINTMENT (OUTPATIENT)
Dept: INFUSION THERAPY | Age: 34
End: 2019-07-15
Payer: MEDICAID

## 2019-07-16 ENCOUNTER — APPOINTMENT (OUTPATIENT)
Dept: INFUSION THERAPY | Age: 34
End: 2019-07-16
Payer: MEDICAID

## 2019-07-16 ENCOUNTER — HOSPITAL ENCOUNTER (OUTPATIENT)
Dept: CT IMAGING | Age: 34
Discharge: HOME | End: 2019-07-16
Attending: INTERNAL MEDICINE
Payer: MEDICAID

## 2019-07-16 DIAGNOSIS — C62.11 MALIGNANT NEOPLASM OF DESCENDED RIGHT TESTIS (HCC): ICD-10-CM

## 2019-07-16 PROCEDURE — 74177 CT ABD & PELVIS W/CONTRAST: CPT

## 2019-07-16 PROCEDURE — 71260 CT THORAX DX C+: CPT

## 2019-07-16 PROCEDURE — 74011636320 HC RX REV CODE- 636/320: Performed by: INTERNAL MEDICINE

## 2019-07-16 RX ORDER — SODIUM CHLORIDE 0.9 % (FLUSH) 0.9 %
10 SYRINGE (ML) INJECTION
Status: COMPLETED | OUTPATIENT
Start: 2019-07-16 | End: 2019-07-16

## 2019-07-16 RX ADMIN — Medication 10 ML: at 10:48

## 2019-07-16 RX ADMIN — IOPAMIDOL 100 ML: 755 INJECTION, SOLUTION INTRAVENOUS at 10:48

## 2019-07-16 RX ADMIN — IOHEXOL 50 ML: 240 INJECTION, SOLUTION INTRATHECAL; INTRAVASCULAR; INTRAVENOUS; ORAL at 10:48

## 2019-07-17 ENCOUNTER — APPOINTMENT (OUTPATIENT)
Dept: INFUSION THERAPY | Age: 34
End: 2019-07-17
Payer: MEDICAID

## 2019-07-18 ENCOUNTER — APPOINTMENT (OUTPATIENT)
Dept: INFUSION THERAPY | Age: 34
End: 2019-07-18
Payer: MEDICAID

## 2019-07-19 ENCOUNTER — APPOINTMENT (OUTPATIENT)
Dept: INFUSION THERAPY | Age: 34
End: 2019-07-19
Payer: MEDICAID

## 2019-07-21 ENCOUNTER — HOSPITAL ENCOUNTER (EMERGENCY)
Age: 34
Discharge: HOME | End: 2019-07-21
Attending: EMERGENCY MEDICINE
Payer: MEDICAID

## 2019-07-21 VITALS
RESPIRATION RATE: 10 BRPM | TEMPERATURE: 98.1 F | HEART RATE: 66 BPM | SYSTOLIC BLOOD PRESSURE: 129 MMHG | DIASTOLIC BLOOD PRESSURE: 89 MMHG | OXYGEN SATURATION: 99 %

## 2019-07-21 DIAGNOSIS — E87.6 HYPOKALEMIA: ICD-10-CM

## 2019-07-21 DIAGNOSIS — R11.2 NON-INTRACTABLE VOMITING WITH NAUSEA, UNSPECIFIED VOMITING TYPE: Primary | ICD-10-CM

## 2019-07-21 LAB
ALBUMIN SERPL-MCNC: 4.2 G/DL (ref 3.5–5)
ALBUMIN/GLOB SERPL: 1.2 {RATIO} (ref 1.1–2.2)
ALP SERPL-CCNC: 67 U/L (ref 45–117)
ALT SERPL-CCNC: 17 U/L (ref 12–78)
ANION GAP SERPL CALC-SCNC: 16 MMOL/L (ref 5–15)
AST SERPL-CCNC: 14 U/L (ref 15–37)
BASOPHILS # BLD: 0.2 K/UL (ref 0–0.1)
BASOPHILS NFR BLD: 2 % (ref 0–1)
BILIRUB SERPL-MCNC: 0.9 MG/DL (ref 0.2–1)
BUN SERPL-MCNC: 20 MG/DL (ref 6–20)
BUN/CREAT SERPL: 19 (ref 12–20)
CALCIUM SERPL-MCNC: 10 MG/DL (ref 8.5–10.1)
CHLORIDE SERPL-SCNC: 91 MMOL/L (ref 97–108)
CO2 SERPL-SCNC: 24 MMOL/L (ref 21–32)
CREAT SERPL-MCNC: 1.07 MG/DL (ref 0.7–1.3)
DIFFERENTIAL METHOD BLD: ABNORMAL
EOSINOPHIL # BLD: 0 K/UL (ref 0–0.4)
EOSINOPHIL NFR BLD: 0 % (ref 0–7)
ERYTHROCYTE [DISTWIDTH] IN BLOOD BY AUTOMATED COUNT: 15.7 % (ref 11.5–14.5)
GLOBULIN SER CALC-MCNC: 3.6 G/DL (ref 2–4)
GLUCOSE SERPL-MCNC: 116 MG/DL (ref 65–100)
HCT VFR BLD AUTO: 40.3 % (ref 36.6–50.3)
HGB BLD-MCNC: 14.4 G/DL (ref 12.1–17)
IMM GRANULOCYTES # BLD AUTO: 0.1 K/UL (ref 0–0.04)
IMM GRANULOCYTES NFR BLD AUTO: 1 % (ref 0–0.5)
LIPASE SERPL-CCNC: 68 U/L (ref 73–393)
LYMPHOCYTES # BLD: 1 K/UL (ref 0.8–3.5)
LYMPHOCYTES NFR BLD: 10 % (ref 12–49)
MCH RBC QN AUTO: 30.8 PG (ref 26–34)
MCHC RBC AUTO-ENTMCNC: 35.7 G/DL (ref 30–36.5)
MCV RBC AUTO: 86.3 FL (ref 80–99)
MONOCYTES # BLD: 1.6 K/UL (ref 0–1)
MONOCYTES NFR BLD: 16 % (ref 5–13)
NEUTS SEG # BLD: 7.1 K/UL (ref 1.8–8)
NEUTS SEG NFR BLD: 71 % (ref 32–75)
NRBC # BLD: 0.02 K/UL (ref 0–0.01)
NRBC BLD-RTO: 0.2 PER 100 WBC
PLATELET # BLD AUTO: 573 K/UL (ref 150–400)
PMV BLD AUTO: 9.3 FL (ref 8.9–12.9)
POTASSIUM SERPL-SCNC: 2.9 MMOL/L (ref 3.5–5.1)
PROT SERPL-MCNC: 7.8 G/DL (ref 6.4–8.2)
RBC # BLD AUTO: 4.67 M/UL (ref 4.1–5.7)
SODIUM SERPL-SCNC: 131 MMOL/L (ref 136–145)
WBC # BLD AUTO: 10 K/UL (ref 4.1–11.1)

## 2019-07-21 PROCEDURE — 99285 EMERGENCY DEPT VISIT HI MDM: CPT

## 2019-07-21 PROCEDURE — 36415 COLL VENOUS BLD VENIPUNCTURE: CPT

## 2019-07-21 PROCEDURE — 83690 ASSAY OF LIPASE: CPT

## 2019-07-21 PROCEDURE — 93005 ELECTROCARDIOGRAM TRACING: CPT

## 2019-07-21 PROCEDURE — 85025 COMPLETE CBC W/AUTO DIFF WBC: CPT

## 2019-07-21 PROCEDURE — 74011250636 HC RX REV CODE- 250/636: Performed by: EMERGENCY MEDICINE

## 2019-07-21 PROCEDURE — 96361 HYDRATE IV INFUSION ADD-ON: CPT

## 2019-07-21 PROCEDURE — 96375 TX/PRO/DX INJ NEW DRUG ADDON: CPT

## 2019-07-21 PROCEDURE — 74011250637 HC RX REV CODE- 250/637: Performed by: EMERGENCY MEDICINE

## 2019-07-21 PROCEDURE — 80053 COMPREHEN METABOLIC PANEL: CPT

## 2019-07-21 PROCEDURE — 96365 THER/PROPH/DIAG IV INF INIT: CPT

## 2019-07-21 RX ORDER — POTASSIUM CHLORIDE 7.45 MG/ML
10 INJECTION INTRAVENOUS
Status: COMPLETED | OUTPATIENT
Start: 2019-07-21 | End: 2019-07-21

## 2019-07-21 RX ORDER — METOCLOPRAMIDE 10 MG/1
10 TABLET ORAL
Qty: 30 TAB | Refills: 0 | Status: SHIPPED | OUTPATIENT
Start: 2019-07-21 | End: 2020-02-26

## 2019-07-21 RX ORDER — METOCLOPRAMIDE HYDROCHLORIDE 5 MG/ML
10 INJECTION INTRAMUSCULAR; INTRAVENOUS
Status: COMPLETED | OUTPATIENT
Start: 2019-07-21 | End: 2019-07-21

## 2019-07-21 RX ORDER — ONDANSETRON 4 MG/1
4 TABLET, ORALLY DISINTEGRATING ORAL
Qty: 12 TAB | Refills: 0 | Status: SHIPPED | OUTPATIENT
Start: 2019-07-21 | End: 2020-02-26

## 2019-07-21 RX ORDER — POTASSIUM CHLORIDE 20 MEQ/1
40 TABLET, EXTENDED RELEASE ORAL
Status: COMPLETED | OUTPATIENT
Start: 2019-07-21 | End: 2019-07-21

## 2019-07-21 RX ORDER — ONDANSETRON 2 MG/ML
4 INJECTION INTRAMUSCULAR; INTRAVENOUS
Status: COMPLETED | OUTPATIENT
Start: 2019-07-21 | End: 2019-07-21

## 2019-07-21 RX ADMIN — SODIUM CHLORIDE 1000 ML: 900 INJECTION, SOLUTION INTRAVENOUS at 18:15

## 2019-07-21 RX ADMIN — SODIUM CHLORIDE 1000 ML: 900 INJECTION, SOLUTION INTRAVENOUS at 19:49

## 2019-07-21 RX ADMIN — POTASSIUM CHLORIDE 10 MEQ: 10 INJECTION, SOLUTION INTRAVENOUS at 19:49

## 2019-07-21 RX ADMIN — METOCLOPRAMIDE 10 MG: 5 INJECTION, SOLUTION INTRAMUSCULAR; INTRAVENOUS at 20:21

## 2019-07-21 RX ADMIN — POTASSIUM CHLORIDE 40 MEQ: 20 TABLET, EXTENDED RELEASE ORAL at 21:08

## 2019-07-21 RX ADMIN — ONDANSETRON 4 MG: 2 INJECTION INTRAMUSCULAR; INTRAVENOUS at 18:17

## 2019-07-21 NOTE — ED PROVIDER NOTES
EMERGENCY DEPARTMENT HISTORY AND PHYSICAL EXAM      Date: 7/21/2019  Patient Name: Lily Medina  Patient Age and Sex: 29 y.o. male     History of Presenting Illness     Chief Complaint   Patient presents with    Nausea    Fatigue       History Provided By: Patient    HPI: Lily Medina is a 71-year-old male with a history of testicular cancer now in remission presenting with nausea and vomiting. Patient states that 2 weeks ago had his last chemotherapy treatment done by Dr. Smiley Johnson. For the past month has had nausea, vomiting with increasing fatigue and weight loss. He has lost about 15 pounds over the past 1 month. Denies any abdominal pain, diarrhea, constipation, fevers, chest pain or shortness of breath. Just states that he is very weak and nauseated. Has been taking Phenergan pills with no improvement as well as Protonix. There are no other complaints, changes, or physical findings at this time. PCP: Carmen Mclean MD    No current facility-administered medications on file prior to encounter. Current Outpatient Medications on File Prior to Encounter   Medication Sig Dispense Refill    pantoprazole (PROTONIX) 40 mg tablet Take 1 Tab by mouth Before breakfast and dinner. 30 Tab 0    promethazine (PHENERGAN) 25 mg tablet Take 1 Tab by mouth every six (6) hours as needed for Nausea. 30 Tab 0    acetaminophen (TYLENOL EXTRA STRENGTH) 500 mg tablet Take 1,000 mg by mouth every six (6) hours as needed for Pain.  diphenhydrAMINE (BENADRYL) 25 mg capsule Take 25 mg by mouth every six (6) hours as needed for Itching or Skin Irritation.  bleomycin (BLENOXANE) 30 unit injection 30 Units by IntraVENous route every seven (7) days.  lidocaine-prilocaine (EMLA) topical cream Apply  to affected area as needed for Pain.  30 g 0       Past History     Past Medical History:  Past Medical History:   Diagnosis Date    Anxiety disorder     Depression     Suicidal thoughts     Testicle [facility])        Past Surgical History:  Past Surgical History:   Procedure Laterality Date    HX ORTHOPAEDIC      lft knee surgery    HX ORTHOPAEDIC      right shoulder/right knee    HX OTHER SURGICAL      testiclwe removed    IR INSERT TUNL CVC W PORT OVER 5 YEARS  4/18/2019       Family History:  History reviewed. No pertinent family history. Social History:  Social History     Tobacco Use    Smoking status: Current Every Day Smoker     Packs/day: 0.50    Smokeless tobacco: Former User   Substance Use Topics    Alcohol use: Yes     Comment: per pt \"once a weekend 6 or 7 beers\"    Drug use: No       Allergies:  No Known Allergies      Review of Systems   Review of Systems   Constitutional: Positive for fatigue. Negative for chills and fever. Respiratory: Negative for cough and shortness of breath. Cardiovascular: Negative for chest pain. Gastrointestinal: Positive for nausea and vomiting. Negative for abdominal pain, constipation and diarrhea. Genitourinary: Negative for dysuria. Neurological: Negative for weakness and numbness. All other systems reviewed and are negative. Physical Exam   Physical Exam   Constitutional: He is oriented to person, place, and time. He appears well-developed and well-nourished. Pale male   HENT:   Head: Normocephalic and atraumatic. Dry mucous membranes   Eyes: Conjunctivae and EOM are normal.   Neck: Normal range of motion. Neck supple. Cardiovascular: Regular rhythm. Tachycardic   Pulmonary/Chest: Effort normal and breath sounds normal. No respiratory distress. He has no wheezes. He has no rales. Abdominal: Soft. He exhibits no distension. There is no tenderness. There is no rebound and no guarding. Musculoskeletal: Normal range of motion. Neurological: He is alert and oriented to person, place, and time. Skin: Skin is warm and dry. Psychiatric:   Flat affect   Nursing note and vitals reviewed.        Diagnostic Study Results     Labs -     Recent Results (from the past 12 hour(s))   CBC WITH AUTOMATED DIFF    Collection Time: 07/21/19  6:18 PM   Result Value Ref Range    WBC 10.0 4.1 - 11.1 K/uL    RBC 4.67 4.10 - 5.70 M/uL    HGB 14.4 12.1 - 17.0 g/dL    HCT 40.3 36.6 - 50.3 %    MCV 86.3 80.0 - 99.0 FL    MCH 30.8 26.0 - 34.0 PG    MCHC 35.7 30.0 - 36.5 g/dL    RDW 15.7 (H) 11.5 - 14.5 %    PLATELET 111 (H) 311 - 400 K/uL    MPV 9.3 8.9 - 12.9 FL    NRBC 0.2 (H) 0  WBC    ABSOLUTE NRBC 0.02 (H) 0.00 - 0.01 K/uL    NEUTROPHILS 71 32 - 75 %    LYMPHOCYTES 10 (L) 12 - 49 %    MONOCYTES 16 (H) 5 - 13 %    EOSINOPHILS 0 0 - 7 %    BASOPHILS 2 (H) 0 - 1 %    IMMATURE GRANULOCYTES 1 (H) 0.0 - 0.5 %    ABS. NEUTROPHILS 7.1 1.8 - 8.0 K/UL    ABS. LYMPHOCYTES 1.0 0.8 - 3.5 K/UL    ABS. MONOCYTES 1.6 (H) 0.0 - 1.0 K/UL    ABS. EOSINOPHILS 0.0 0.0 - 0.4 K/UL    ABS. BASOPHILS 0.2 (H) 0.0 - 0.1 K/UL    ABS. IMM. GRANS. 0.1 (H) 0.00 - 0.04 K/UL    DF AUTOMATED     METABOLIC PANEL, COMPREHENSIVE    Collection Time: 07/21/19  6:18 PM   Result Value Ref Range    Sodium 131 (L) 136 - 145 mmol/L    Potassium 2.9 (L) 3.5 - 5.1 mmol/L    Chloride 91 (L) 97 - 108 mmol/L    CO2 24 21 - 32 mmol/L    Anion gap 16 (H) 5 - 15 mmol/L    Glucose 116 (H) 65 - 100 mg/dL    BUN 20 6 - 20 MG/DL    Creatinine 1.07 0.70 - 1.30 MG/DL    BUN/Creatinine ratio 19 12 - 20      GFR est AA >60 >60 ml/min/1.73m2    GFR est non-AA >60 >60 ml/min/1.73m2    Calcium 10.0 8.5 - 10.1 MG/DL    Bilirubin, total 0.9 0.2 - 1.0 MG/DL    ALT (SGPT) 17 12 - 78 U/L    AST (SGOT) 14 (L) 15 - 37 U/L    Alk.  phosphatase 67 45 - 117 U/L    Protein, total 7.8 6.4 - 8.2 g/dL    Albumin 4.2 3.5 - 5.0 g/dL    Globulin 3.6 2.0 - 4.0 g/dL    A-G Ratio 1.2 1.1 - 2.2     LIPASE    Collection Time: 07/21/19  6:18 PM   Result Value Ref Range    Lipase 68 (L) 73 - 393 U/L       Radiologic Studies -   No orders to display     CT Results  (Last 48 hours)    None        CXR Results  (Last 48 hours)    None            Medical Decision Making   I am the first provider for this patient. I reviewed the vital signs, available nursing notes, past medical history, past surgical history, family history and social history. Vital Signs-Reviewed the patient's vital signs. Patient Vitals for the past 12 hrs:   Temp Pulse Resp BP SpO2   07/21/19 2130  66 10 129/89 99 %   07/21/19 2100  64 15 109/85 99 %   07/21/19 2045  78 14 133/71 99 %   07/21/19 2000  65 15 134/82 99 %   07/21/19 1930  72 12 121/87 99 %   07/21/19 1900  71 16 (!) 130/92 98 %   07/21/19 1830  77 10 130/86 98 %   07/21/19 1815  89 15 (!) 115/91 95 %   07/21/19 1747 98.1 °F (36.7 °C) (!) 124 18 117/88 98 %       Records Reviewed: Nursing Notes and Old Medical Records    Provider Notes (Medical Decision Making):   Patient presenting with persistent nausea and vomiting. Likely chemotherapy side effect, cancer related, dehydration, letter light abnormality, less likely hepatic, pancreatic or gallbladder related. He is not having any acute abdominal pain so unlikely small bowel obstruction or intra-abdominal infection. We will get lab work, fluids and IV antiemetics to see if any improvement. ED Course:   Initial assessment performed. The patients presenting problems have been discussed, and they are in agreement with the care plan formulated and outlined with them. I have encouraged them to ask questions as they arise throughout their visit. ED Course as of Jul 21 2206   Sun Jul 21, 2019   Luis Monge Patient's lab work shows hypokalemia but no acute pathology. We will give him a second liter of fluid as well as potassium replacement. Discussed dissolvable Zofran versus Phenergan suppositories in order for him to keep nausea medications down. [JS]   2007 After patient was p.o. challenge with water, he vomited again. Will give Reglan and Phenergan and see if he is able to pass p.o. challenge with that.     [JS]   2105 Patient only getting Phenergan now. [JS]   2133 After Reglan, patient was able to drink multiple cups of water as well as keep down his oral potassium pills. [JS]      ED Course User Index  [JS] Karlo Thayer MD     Critical Care Time:   0    Disposition:  Discharge Note:  The patient has been re-evaluated and is ready for discharge. Reviewed available results with patient. Counseled patient on diagnosis and care plan. Patient has expressed understanding, and all questions have been answered. Patient agrees with plan and agrees to follow up as recommended, or to return to the ED if their symptoms worsen. Discharge instructions have been provided and explained to the patient, along with reasons to return to the ED. PLAN:  Discharge Medication List as of 7/21/2019  9:39 PM      START taking these medications    Details   metoclopramide HCl (REGLAN) 10 mg tablet Take 1 Tab by mouth every six (6) hours as needed for Nausea., Normal, Disp-30 Tab, R-0      ondansetron (ZOFRAN ODT) 4 mg disintegrating tablet Take 1 Tab by mouth every eight (8) hours as needed for Nausea., Normal, Disp-12 Tab, R-0         CONTINUE these medications which have NOT CHANGED    Details   pantoprazole (PROTONIX) 40 mg tablet Take 1 Tab by mouth Before breakfast and dinner., Print, Disp-30 Tab, R-0      promethazine (PHENERGAN) 25 mg tablet Take 1 Tab by mouth every six (6) hours as needed for Nausea. , Print, Disp-30 Tab, R-0      acetaminophen (TYLENOL EXTRA STRENGTH) 500 mg tablet Take 1,000 mg by mouth every six (6) hours as needed for Pain., Historical Med      diphenhydrAMINE (BENADRYL) 25 mg capsule Take 25 mg by mouth every six (6) hours as needed for Itching or Skin Irritation. , Historical Med      bleomycin (BLENOXANE) 30 unit injection 30 Units by IntraVENous route every seven (7) days. , Historical Med      lidocaine-prilocaine (EMLA) topical cream Apply  to affected area as needed for Pain., Print, Disp-30 g, R-0           2. Follow-up Information     Follow up With Specialties Details Why Contact Info    Lima Ashford MD [facility], [place], operated by [facility]  As needed [place] [phone]          3. Return to ED if worse     Diagnosis     Clinical Impression:   1. Non-intractable vomiting with nausea, unspecified vomiting type    2. Hypokalemia        Attestations:    Katlyn Crow M.D. Please note that this dictation was completed with [employer], the computer voice recognition software. Quite often unanticipated grammatical, syntax, homophones, and other interpretive errors are inadvertently transcribed by the computer software. Please disregard these errors. Please excuse any errors that have escaped final proofreading. Thank you.

## 2019-07-22 ENCOUNTER — APPOINTMENT (OUTPATIENT)
Dept: INFUSION THERAPY | Age: 34
End: 2019-07-22
Payer: MEDICAID

## 2019-07-22 LAB
ATRIAL RATE: 109 BPM
CALCULATED P AXIS, ECG09: 63 DEGREES
CALCULATED R AXIS, ECG10: -25 DEGREES
CALCULATED T AXIS, ECG11: 23 DEGREES
DIAGNOSIS, 93000: NORMAL
P-R INTERVAL, ECG05: 130 MS
Q-T INTERVAL, ECG07: 356 MS
QRS DURATION, ECG06: 78 MS
QTC CALCULATION (BEZET), ECG08: 479 MS
VENTRICULAR RATE, ECG03: 109 BPM

## 2019-07-22 NOTE — DISCHARGE INSTRUCTIONS
Patient Education        Hypokalemia: Care Instructions  Your Care Instructions    Hypokalemia (say \"uf-pv-vwd-TIERNEY-susana-uh\") is a low level of potassium. The heart, muscles, kidneys, and nervous system all need potassium to work well. This problem has many different causes. Kidney problems, diet, and medicines like diuretics and laxatives can cause it. So can vomiting or diarrhea. In some cases, cancer is the cause. Your doctor may do tests to find the cause of your low potassium levels. You may need medicines to bring your potassium levels back to normal. You may also need regular blood tests to check your potassium. If you have very low potassium, you may need intravenous (IV) medicines. You also may need tests to check the electrical activity of your heart. Heart problems caused by low potassium levels can be very serious. Follow-up care is a key part of your treatment and safety. Be sure to make and go to all appointments, and call your doctor if you are having problems. It's also a good idea to know your test results and keep a list of the medicines you take. How can you care for yourself at home? · If your doctor recommends it, eat foods that have a lot of potassium. These include fresh fruits, juices, and vegetables. They also include nuts, beans, and milk. · Be safe with medicines. If your doctor prescribes medicines or potassium supplements, take them exactly as directed. Call your doctor if you have any problems with your medicines. · Get your potassium levels tested as often as your doctor tells you. When should you call for help? Call 911 anytime you think you may need emergency care. For example, call if:    · You feel like your heart is missing beats.  Heart problems caused by low potassium can cause death.     · You passed out (lost consciousness).     · You have a seizure.    Call your doctor now or seek immediate medical care if:    · You feel weak or unusually tired.     · You have severe arm or leg cramps.     · You have tingling or numbness.     · You feel sick to your stomach, or you vomit.     · You have belly cramps.     · You feel bloated or constipated.     · You have to urinate a lot.     · You feel very thirsty most of the time.     · You are dizzy or lightheaded, or you feel like you may faint.     · You feel depressed, or you lose touch with reality.    Watch closely for changes in your health, and be sure to contact your doctor if:    · You do not get better as expected. Where can you learn more? Go to http://sol-domitila.info/. Enter G358 in the search box to learn more about \"Hypokalemia: Care Instructions. \"  Current as of: November 6, 2018  Content Version: 12.1  © 6379-8635 [employer]. Care instructions adapted under license by [employer] (which disclaims liability or warranty for this information). If you have questions about a medical condition or this instruction, always ask your healthcare professional. [place] [address] any warranty or liability for your use of this information. Patient Education        Nausea and Vomiting: Care Instructions  Your Care Instructions    When you are nauseated, you may feel weak and sweaty and notice a lot of saliva in your mouth. Nausea often leads to vomiting. Most of the time you do not need to worry about nausea and vomiting, but they can be signs of other illnesses. Two common causes of nausea and vomiting are stomach flu and food poisoning. Nausea and vomiting from viral stomach flu will usually start to improve within 24 hours. Nausea and vomiting from food poisoning may last from 12 to 48 hours. The doctor has checked you carefully, but problems can develop later. If you notice any problems or new symptoms, get medical treatment right away. Follow-up care is a key part of your treatment and safety.  Be sure to make and go to all appointments, and call your doctor if you are having problems. It's also a good idea to know your test results and keep a list of the medicines you take. How can you care for yourself at home? · To prevent dehydration, drink plenty of fluids, enough so that your urine is light yellow or clear like water. Choose water and other caffeine-free clear liquids until you feel better. If you have kidney, heart, or liver disease and have to limit fluids, talk with your doctor before you increase the amount of fluids you drink. · Rest in bed until you feel better. · When you are able to eat, try clear soups, mild foods, and liquids until all symptoms are gone for 12 to 48 hours. Other good choices include dry toast, crackers, cooked cereal, and gelatin dessert, such as Jell-O. When should you call for help? Call 911 anytime you think you may need emergency care. For example, call if:    · You passed out (lost consciousness).    Call your doctor now or seek immediate medical care if:    · You have symptoms of dehydration, such as:  ? Dry eyes and a dry mouth. ? Passing only a little dark urine. ? Feeling thirstier than usual.     · You have new or worsening belly pain.     · You have a new or higher fever.     · You vomit blood or what looks like coffee grounds.    Watch closely for changes in your health, and be sure to contact your doctor if:    · You have ongoing nausea and vomiting.     · Your vomiting is getting worse.     · Your vomiting lasts longer than 2 days.     · You are not getting better as expected. Where can you learn more? Go to http://sol-domitila.info/. Enter 25 125296 in the search box to learn more about \"Nausea and Vomiting: Care Instructions. \"  Current as of: September 23, 2018  Content Version: 12.1  © 1823-7792 [employer]. Care instructions adapted under license by [employer] (which disclaims liability or warranty for this information).  If you have questions about a medical condition or this instruction, always ask your healthcare professional. [place] [address] any warranty or liability for your use of this information.

## 2019-07-22 NOTE — ED NOTES
Dr. Tristian Akbar has reviewed discharge instructions with the patient. The patient verbalized understanding. Pt ambulated from department in no apparent distress. VSS.

## 2019-07-29 ENCOUNTER — APPOINTMENT (OUTPATIENT)
Dept: INFUSION THERAPY | Age: 34
End: 2019-07-29
Payer: MEDICAID

## 2019-08-28 DIAGNOSIS — C62.10 MALIGNANT NEOPLASM OF DESCENDED TESTIS, UNSPECIFIED LATERALITY (HCC): Primary | ICD-10-CM

## 2019-09-12 ENCOUNTER — HOSPITAL ENCOUNTER (OUTPATIENT)
Dept: INTERVENTIONAL RADIOLOGY/VASCULAR | Age: 34
Discharge: HOME | End: 2019-09-12
Attending: INTERNAL MEDICINE
Payer: COMMERCIAL

## 2019-09-12 VITALS
DIASTOLIC BLOOD PRESSURE: 77 MMHG | TEMPERATURE: 98.2 F | BODY MASS INDEX: 27.09 KG/M2 | HEART RATE: 82 BPM | SYSTOLIC BLOOD PRESSURE: 138 MMHG | HEIGHT: 72 IN | WEIGHT: 200 LBS | OXYGEN SATURATION: 99 % | RESPIRATION RATE: 18 BRPM

## 2019-09-12 DIAGNOSIS — C62.10 MALIGNANT NEOPLASM OF DESCENDED TESTIS, UNSPECIFIED LATERALITY (HCC): ICD-10-CM

## 2019-09-12 PROCEDURE — 74011250636 HC RX REV CODE- 250/636: Performed by: RADIOLOGY

## 2019-09-12 PROCEDURE — 74011000250 HC RX REV CODE- 250: Performed by: RADIOLOGY

## 2019-09-12 PROCEDURE — 77030039266 HC ADH SKN EXOFIN S2SG -A

## 2019-09-12 PROCEDURE — 36589 REMOVAL TUNNELED CV CATH: CPT

## 2019-09-12 PROCEDURE — 77030031139 HC SUT VCRL2 J&J -A

## 2019-09-12 RX ORDER — LIDOCAINE HYDROCHLORIDE AND EPINEPHRINE 10; 10 MG/ML; UG/ML
20 INJECTION, SOLUTION INFILTRATION; PERINEURAL ONCE
Status: COMPLETED | OUTPATIENT
Start: 2019-09-12 | End: 2019-09-12

## 2019-09-12 RX ORDER — MIDAZOLAM HYDROCHLORIDE 1 MG/ML
10 INJECTION, SOLUTION INTRAMUSCULAR; INTRAVENOUS
Status: DISCONTINUED | OUTPATIENT
Start: 2019-09-12 | End: 2019-09-12

## 2019-09-12 RX ORDER — FENTANYL CITRATE 50 UG/ML
200 INJECTION, SOLUTION INTRAMUSCULAR; INTRAVENOUS
Status: DISCONTINUED | OUTPATIENT
Start: 2019-09-12 | End: 2019-09-12

## 2019-09-12 RX ORDER — CEFAZOLIN SODIUM/WATER 2 G/20 ML
2 SYRINGE (ML) INTRAVENOUS ONCE
Status: COMPLETED | OUTPATIENT
Start: 2019-09-12 | End: 2019-09-12

## 2019-09-12 RX ORDER — LIDOCAINE HYDROCHLORIDE 20 MG/ML
20 INJECTION, SOLUTION INFILTRATION; PERINEURAL ONCE
Status: COMPLETED | OUTPATIENT
Start: 2019-09-12 | End: 2019-09-12

## 2019-09-12 RX ORDER — HEPARIN SODIUM 200 [USP'U]/100ML
400 INJECTION, SOLUTION INTRAVENOUS ONCE
Status: DISPENSED | OUTPATIENT
Start: 2019-09-12 | End: 2019-09-12

## 2019-09-12 RX ADMIN — LIDOCAINE HYDROCHLORIDE 10 MG: 20 INJECTION, SOLUTION INFILTRATION; PERINEURAL at 09:00

## 2019-09-12 RX ADMIN — Medication 2 G: at 08:34

## 2019-09-12 RX ADMIN — MIDAZOLAM 2 MG: 1 INJECTION INTRAMUSCULAR; INTRAVENOUS at 08:30

## 2019-09-12 RX ADMIN — FENTANYL CITRATE 25 MCG: 50 INJECTION, SOLUTION INTRAMUSCULAR; INTRAVENOUS at 08:57

## 2019-09-12 RX ADMIN — FENTANYL CITRATE 25 MCG: 50 INJECTION, SOLUTION INTRAMUSCULAR; INTRAVENOUS at 08:35

## 2019-09-12 RX ADMIN — MIDAZOLAM 1 MG: 1 INJECTION INTRAMUSCULAR; INTRAVENOUS at 08:35

## 2019-09-12 RX ADMIN — FENTANYL CITRATE 25 MCG: 50 INJECTION, SOLUTION INTRAMUSCULAR; INTRAVENOUS at 08:46

## 2019-09-12 RX ADMIN — LIDOCAINE HYDROCHLORIDE AND EPINEPHRINE 10 MG: 10; 10 INJECTION, SOLUTION INFILTRATION; PERINEURAL at 09:00

## 2019-09-12 RX ADMIN — FENTANYL CITRATE 25 MCG: 50 INJECTION, SOLUTION INTRAMUSCULAR; INTRAVENOUS at 08:43

## 2019-09-12 RX ADMIN — MIDAZOLAM 1 MG: 1 INJECTION INTRAMUSCULAR; INTRAVENOUS at 08:43

## 2019-09-12 RX ADMIN — FENTANYL CITRATE 50 MCG: 50 INJECTION, SOLUTION INTRAMUSCULAR; INTRAVENOUS at 08:30

## 2019-09-12 RX ADMIN — FENTANYL CITRATE 25 MCG: 50 INJECTION, SOLUTION INTRAMUSCULAR; INTRAVENOUS at 08:50

## 2019-09-12 RX ADMIN — MIDAZOLAM 1 MG: 1 INJECTION INTRAMUSCULAR; INTRAVENOUS at 08:39

## 2019-09-12 RX ADMIN — MIDAZOLAM 1 MG: 1 INJECTION INTRAMUSCULAR; INTRAVENOUS at 08:50

## 2019-09-12 RX ADMIN — MIDAZOLAM 1 MG: 1 INJECTION INTRAMUSCULAR; INTRAVENOUS at 08:46

## 2019-09-12 RX ADMIN — FENTANYL CITRATE 25 MCG: 50 INJECTION, SOLUTION INTRAMUSCULAR; INTRAVENOUS at 08:39

## 2019-09-12 NOTE — DISCHARGE INSTRUCTIONS
[address] [facility]  Radiology Department  [phone]    Radiologist: Dr. Sosa Trujillo    Date: 9/12/2019         Bone Marrow Biopsy Discharge Instructions      Go home and rest  and restrict your activity the next 24 hours. You have been given sedating medications, so do not drive or drink alcohol today. Resume your previous diet and medications. You may shower in 24 hours. Do not soak or swim until the biopsy site has healed completely to minimize any risk of infection. Watch site for redness, drainage, pus, foul odor, increasing pain and fevers. Should this occur call you doctor immediatly. You may take Tylenol, as directed on the label, for pain or discomfort. Avoid Ibuprofen (Advil, Motrin etc.) and Aspirin today as they may increase your risk of bleeding. Be sure to follow up with your physician, and let him know how you are progressing. Your results will be sent to your physician as soon as they become available.       I

## 2019-09-12 NOTE — PROGRESS NOTES
Name of procedure: Port Removal    Complications, if any, r/t procedure: none    Sedation medications given: 7 mg Versed, 200 mcg Fentanyl    Sedation tolerated: well    VS : Stable    Post Procedure Care Needed/order sets in connectcare: see orders    Pt tolerated procedure well. VSS. No C/O pain. Dressing to site D&I. No bleeding or hematoma noted to site. HOB elevated. Pt resting comfortably on stretcher. IV D/Cd. Discharge instructions given. Copy on chart and copy given to pt. Pt verbalized understanding. Pt taken to car by wheelchair and taken home by family. NAD noted at time of discharge.

## 2019-09-12 NOTE — H&P
Interventional Radiology History and Physical (Outpatient)    9/12/2019    Patient: Collette Litten 36 y.o. male     Referring Physician:  Jos Betancourt MD    Chief Complaint: need port out    History of Present Illness: no longer needs or want to keep port    History:  Past Medical History:   Diagnosis Date    Anxiety disorder     Depression     Suicidal thoughts     Testicle [facility] ([facility] [address]      No family history on file.   Social History     Socioeconomic History    Marital status: SINGLE     Spouse name: Not on file    Number of children: Not on file    Years of education: Not on file    Highest education level: Not on file   Occupational History    Not on file   Social Needs    Financial resource strain: Not on file    Food insecurity:     Worry: Not on file     Inability: Not on file    Transportation needs:     Medical: Not on file     Non-medical: Not on file   Tobacco Use    Smoking status: Current Every Day Smoker     Packs/day: 0.50    Smokeless tobacco: Former User   Substance and Sexual Activity    Alcohol use: Yes     Comment: per pt \"once a weekend 6 or 7 beers\"    Drug use: No    Sexual activity: Never   Lifestyle    Physical activity:     Days per week: Not on file     Minutes per session: Not on file    Stress: Not on file   Relationships    Social connections:     Talks on phone: Not on file     Gets together: Not on file     Attends Muslim service: Not on file     Active member of club or organization: Not on file     Attends meetings of clubs or organizations: Not on file     Relationship status: Not on file    Intimate partner violence:     Fear of current or ex partner: Not on file     Emotionally abused: Not on file     Physically abused: Not on file     Forced sexual activity: Not on file   Other Topics Concern    Not on file   Social History Narrative    28year old single  male who lives with his parents and works at [employer], voluntarily admitted for reported SI on \"facebook\". Pt has had past [facility] admissions. He has overdosed \"4 times\". He has been chronically non compliant with any outpatient discharge recommendations for treatment. Pt. \"has some college\" and is . Allergies: No Known Allergies    Prior to Admission Medications:  Prior to Admission medications    Medication Sig Start Date End Date Taking? Authorizing Provider   metoclopramide HCl (REGLAN) 10 mg tablet Take 1 Tab by mouth every six (6) hours as needed for Nausea. 7/21/19   Abby Alvarez MD   ondansetron (ZOFRAN ODT) 4 mg disintegrating tablet Take 1 Tab by mouth every eight (8) hours as needed for Nausea. 7/21/19   Abby Alvarez MD   pantoprazole (PROTONIX) 40 mg tablet Take 1 Tab by mouth Before breakfast and dinner. 7/9/19   Shelley Mckee NP   promethazine (PHENERGAN) 25 mg tablet Take 1 Tab by mouth every six (6) hours as needed for Nausea. 7/9/19   Shelley Mckee NP   acetaminophen (TYLENOL EXTRA STRENGTH) 500 mg tablet Take 1,000 mg by mouth every six (6) hours as needed for Pain. Other, MD Janice   diphenhydrAMINE (BENADRYL) 25 mg capsule Take 25 mg by mouth every six (6) hours as needed for Itching or Skin Irritation. Other, MD Janice   bleomycin (BLENOXANE) 30 unit injection 30 Units by IntraVENous route every seven (7) days. Provider, Lluvia   lidocaine-prilocaine (EMLA) topical cream Apply  to affected area as needed for Pain. 4/8/19   Frankie General, NP       Physical Exam:    Blood pressure 103/80, pulse 71, temperature 98.2 °F (36.8 °C), resp. rate 18, height 6' (1.829 m), weight 90.7 kg (200 lb), SpO2 99 %. General: alert, cooperative, no distress, appears stated age  Heart: rrr  Lungs: clear to auscultation bilaterally  Abdomen: soft  Neuro: grossly intact  Extremities: extremities wnl    Plan of Care/Planned Procedure:  Risks, benefits, and alternatives reviewed with patient and he agrees to proceed with the procedure.      Dee Ralph Jayna Reyes MD

## 2019-10-03 ENCOUNTER — HOSPITAL ENCOUNTER (OUTPATIENT)
Dept: INFUSION THERAPY | Age: 34
End: 2019-10-03

## 2019-12-02 NOTE — PROGRESS NOTES
Outpatient Infusion Center Short Visit Progress Note    1600 Patient admitted to [facility] for Hydration ambulatory in stable condition. Assessment completed. No new concerns voiced. Vital Signs:  Patient Vitals for the past 12 hrs:   Temp Pulse Resp BP SpO2   06/20/19 1601 98.6 °F (37 °C) (!) 102 18 131/88 98 %       RCW port accessed without difficulty with 0.75 lópez needle; with positive blood return. Medications:  NS over 1 hour    Patient port flushed and heparinized; de-accessed per protocol with bandage placed over site. Patient tolerated treatment well. Patient discharged from [place] [address] ambulatory in no distress at 1710. Patient aware of next appointment.     Future Appointments   Date Time Provider Vicki Miranda   6/24/2019 10:00 AM DANIELLE INFUSION NURSE 4 [facility] REG   6/25/2019 11:00 AM DANIELLE INFUSION NURSE 4 [facility] REG   6/26/2019 11:00 AM DANIELLE INFUSION NURSE 4 [facility] REG   6/27/2019 10:00 AM DANIELLE INFUSION NURSE 4 [facility] REG   6/28/2019 11:00 AM DANIELLE INFUSION NURSE 4 [facility] REG   7/1/2019 11:00 AM DANIELLE INFUSION NURSE 4 [address] REG   7/1/2019 11:15 AM Donavan Conn NP [facility] SCHED   7/8/2019 11:00 AM DANIELLE INFUSION NURSE 4 [facility] REG 2

## 2020-02-26 ENCOUNTER — DOCUMENTATION ONLY (OUTPATIENT)
Dept: ONCOLOGY | Age: 35
End: 2020-02-26

## 2020-02-26 ENCOUNTER — OFFICE VISIT (OUTPATIENT)
Dept: ONCOLOGY | Age: 35
End: 2020-02-26

## 2020-02-26 VITALS
HEART RATE: 63 BPM | OXYGEN SATURATION: 98 % | WEIGHT: 219.6 LBS | TEMPERATURE: 98.2 F | SYSTOLIC BLOOD PRESSURE: 123 MMHG | BODY MASS INDEX: 29.74 KG/M2 | HEIGHT: 72 IN | DIASTOLIC BLOOD PRESSURE: 82 MMHG

## 2020-02-26 DIAGNOSIS — C62.10 MALIGNANT NEOPLASM OF DESCENDED TESTIS, UNSPECIFIED LATERALITY (HCC): ICD-10-CM

## 2020-02-26 DIAGNOSIS — Z08 ENCOUNTER FOR FOLLOW-UP SURVEILLANCE OF TESTICULAR CANCER: ICD-10-CM

## 2020-02-26 DIAGNOSIS — Z85.47 ENCOUNTER FOR FOLLOW-UP SURVEILLANCE OF TESTICULAR CANCER: ICD-10-CM

## 2020-02-26 DIAGNOSIS — C62.11 MALIGNANT NEOPLASM OF DESCENDED RIGHT TESTIS (HCC): Primary | ICD-10-CM

## 2020-02-26 NOTE — PROGRESS NOTES
[employer]  Social Work Navigator Encounter     Patient Name:  Mtichell Cavanaugh    Medical History: dx testicular cancer    Advance Directives:    Narrative:     Barriers to Care:     Assessment/Action:    Plan/Referral:   Financial/Medication assistance referral   Care Card appl. Pt card    Other referral  Depression - pt open to counselor and someone to write for depression   PCP referral SW provided names/nbrs of providers for pt  To call.

## 2020-02-26 NOTE — PROGRESS NOTES
Dayan Taveras is a 36 y.o. male  Here for 6 month follow up for:  Chief Complaint   Patient presents with    Cancer     testicular     1. Have you been to the ER, urgent care clinic since your last visit? Hospitalized since your last visit? ED 7/21 for nausea/fatigue     2. Have you seen or consulted any other health care providers outside of the [address] [place] [address] since your last visit? Include any pap smears or colon screening. [facility] end of July for nausea and fatigue. Was there for 1 week. Had not eaten for about 2 months before. Had inflammation from throat to stomach. Endoscopy performed. Pt states he is blacking out a lot. Girlfriend states he gest dizzy and collapses. Has been doing this since about October. May not happen every week but sometimes multiple times a week. Pt states he can't sleep. Anxiety and depression is getting bad. Very forgetful. Can't concentrate. Gets dizzy a lot! Pt states he is very itchy and lost hair only on the outside of legs.

## 2020-02-26 NOTE — PROGRESS NOTES
[address], [address] [place] [address] Juan M Cunha, [address] [facility]  [phone]       Oncology progress note        Patient: Willi Hawkins MRN: [id number]  SSN: [id number]    YOB: 1985  Age: 36 y.o. Sex: male        Diagnosis:      1. 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 Right sided radical orchiectomy  2. Systemic chemotherapy,   BEP - s/p 3 cycles    Subjective:      Willi Hawkins is a 36 y.o. male male who noted a swelling in the right testicles for over 6 months. The swelling was associated with a dragging sensation. The patient then was seen by Dr. Husam Lang. He underwent a right radical orchiectomy on 03/27/2019. The pathology shows T1c disease. A CT scan was done on 04/03 which reveals metastatic disease. Mr. Di Marinelli received 3 cycles  of BEP. He maintains CR. He is doing well and is back to work. Review of Systems:    Constitutional: negative  Eyes: negative  Ears, Nose, Mouth, Throat, and Face: negative  Respiratory: negative  Cardiovascular: negative  Gastrointestinal: nausea/vomiting  Genitourinary:negative  Integument/Breast: negative  Hematologic/Lymphatic: negative  Musculoskeletal:negative  Neurological: negative      Past Medical History:   Diagnosis Date    Anxiety disorder     Depression     Suicidal thoughts     Testicle cancer ([facility] [address]      Past Surgical History:   Procedure Laterality Date    HX ORTHOPAEDIC      lft knee surgery    HX ORTHOPAEDIC      right shoulder/right knee    HX OTHER SURGICAL      testiclwe removed    IR INSERT TUNL CVC W PORT OVER 5 YEARS  4/18/2019    IR REMOVE TUNL CVAD W/O PORT / PUMP  9/12/2019      No family history on file.   Social History     Tobacco Use    Smoking status: Current Every Day Smoker     Packs/day: 0.50    Smokeless tobacco: Former User Substance Use Topics    Alcohol use: Yes     Comment: per pt \"once a weekend 6 or 7 beers\"      Prior to Admission medications    Medication Sig Start Date End Date Taking? Authorizing Provider   metoclopramide HCl (REGLAN) 10 mg tablet Take 1 Tab by mouth every six (6) hours as needed for Nausea. 7/21/19 2/26/20  Shasha Soto MD   ondansetron (ZOFRAN ODT) 4 mg disintegrating tablet Take 1 Tab by mouth every eight (8) hours as needed for Nausea. 7/21/19 2/26/20  Shasha Soto MD   pantoprazole (PROTONIX) 40 mg tablet Take 1 Tab by mouth Before breakfast and dinner. 7/9/19 2/26/20  Shelley Mckee NP   promethazine (PHENERGAN) 25 mg tablet Take 1 Tab by mouth every six (6) hours as needed for Nausea. 7/9/19 2/26/20  Shelley Mckee NP   acetaminophen (TYLENOL EXTRA STRENGTH) 500 mg tablet Take 1,000 mg by mouth every six (6) hours as needed for Pain. 2/26/20  Janice Aiken MD   diphenhydrAMINE (BENADRYL) 25 mg capsule Take 25 mg by mouth every six (6) hours as needed for Itching or Skin Irritation. 2/26/20  Janice Aiken MD   bleomycin (BLENOXANE) 30 unit injection 30 Units by IntraVENous route every seven (7) days. 2/26/20  Provider, Lluvia   lidocaine-prilocaine (EMLA) topical cream Apply  to affected area as needed for Pain. 4/8/19 2/26/20  Ankur Malhotra NP          No Known Allergies        Objective:     Visit Vitals  /82 (BP 1 Location: Left arm, BP Patient Position: Sitting)   Pulse 63   Temp 98.2 °F (36.8 °C) (Oral)   Ht 6' (1.829 m)   Wt 219 lb 9.6 oz (99.6 kg)   SpO2 98%   BMI 29.78 kg/m²     Pain Scale: 0 - No pain/10      Physical Exam:    GENERAL: alert, cooperative, no distress, appears stated age  EYE: conjunctivae/corneas clear. PERRL, EOM's intact  LYMPHATIC: Cervical, supraclavicular, and axillary nodes normal.   THROAT & NECK: normal and no erythema or exudates noted.    LUNG: clear to auscultation bilaterally  HEART: regular rate and rhythm, S1, S2 normal, no murmur, click, rub or gallop  ABDOMEN: soft, non-tender. Bowel sounds normal. No masses,  no organomegaly  EXTREMITIES:  extremities normal, atraumatic, no cyanosis or edema  SKIN: extensive tattoo on the torso and arms  NEUROLOGIC: AOx3. Gait normal. Reflexes and motor strength normal and symmetric. Cranial nerves 2-12 and sensation grossly intact. Lab Results   Component Value Date/Time    WBC 10.0 07/21/2019 06:18 PM    HGB 14.4 07/21/2019 06:18 PM    HCT 40.3 07/21/2019 06:18 PM    PLATELET 301 (H) 86/03/1273 06:18 PM    MCV 86.3 07/21/2019 06:18 PM       Lab Results   Component Value Date/Time    Sodium 131 (L) 07/21/2019 06:18 PM    Potassium 2.9 (L) 07/21/2019 06:18 PM    Chloride 91 (L) 07/21/2019 06:18 PM    CO2 24 07/21/2019 06:18 PM    Anion gap 16 (H) 07/21/2019 06:18 PM    Glucose 116 (H) 07/21/2019 06:18 PM    BUN 20 07/21/2019 06:18 PM    Creatinine 1.07 07/21/2019 06:18 PM    BUN/Creatinine ratio 19 07/21/2019 06:18 PM    GFR est AA >60 07/21/2019 06:18 PM    GFR est non-AA >60 07/21/2019 06:18 PM    Calcium 10.0 07/21/2019 06:18 PM    Bilirubin, total 0.9 07/21/2019 06:18 PM    AST (SGOT) 14 (L) 07/21/2019 06:18 PM    Alk. phosphatase 67 07/21/2019 06:18 PM    Protein, total 7.8 07/21/2019 06:18 PM    Albumin 4.2 07/21/2019 06:18 PM    Globulin 3.6 07/21/2019 06:18 PM    A-G Ratio 1.2 07/21/2019 06:18 PM    ALT (SGPT) 17 07/21/2019 06:18 PM       Assessment:     1.  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  Minnie Hopper MD

## 2020-03-05 ENCOUNTER — HOSPITAL ENCOUNTER (OUTPATIENT)
Dept: INFUSION THERAPY | Age: 35
Discharge: HOME | End: 2020-03-05
Payer: COMMERCIAL

## 2020-03-05 VITALS
WEIGHT: 217.4 LBS | OXYGEN SATURATION: 99 % | SYSTOLIC BLOOD PRESSURE: 118 MMHG | HEART RATE: 78 BPM | TEMPERATURE: 98.5 F | RESPIRATION RATE: 18 BRPM | DIASTOLIC BLOOD PRESSURE: 70 MMHG | BODY MASS INDEX: 29.48 KG/M2

## 2020-03-05 LAB
ALBUMIN SERPL-MCNC: 4 G/DL (ref 3.5–5)
ALBUMIN/GLOB SERPL: 1.4 {RATIO} (ref 1.1–2.2)
ALP SERPL-CCNC: 61 U/L (ref 45–117)
ALT SERPL-CCNC: 32 U/L (ref 12–78)
ANION GAP SERPL CALC-SCNC: 4 MMOL/L (ref 5–15)
AST SERPL-CCNC: 19 U/L (ref 15–37)
BASOPHILS # BLD: 0 K/UL (ref 0–0.1)
BASOPHILS NFR BLD: 1 % (ref 0–1)
BILIRUB SERPL-MCNC: 0.4 MG/DL (ref 0.2–1)
BUN SERPL-MCNC: 13 MG/DL (ref 6–20)
BUN/CREAT SERPL: 11 (ref 12–20)
CALCIUM SERPL-MCNC: 9 MG/DL (ref 8.5–10.1)
CHLORIDE SERPL-SCNC: 108 MMOL/L (ref 97–108)
CO2 SERPL-SCNC: 28 MMOL/L (ref 21–32)
CREAT SERPL-MCNC: 1.14 MG/DL (ref 0.7–1.3)
DIFFERENTIAL METHOD BLD: ABNORMAL
EOSINOPHIL # BLD: 0.1 K/UL (ref 0–0.4)
EOSINOPHIL NFR BLD: 2 % (ref 0–7)
ERYTHROCYTE [DISTWIDTH] IN BLOOD BY AUTOMATED COUNT: 13.1 % (ref 11.5–14.5)
GLOBULIN SER CALC-MCNC: 2.8 G/DL (ref 2–4)
GLUCOSE SERPL-MCNC: 121 MG/DL (ref 65–100)
HCG SERPL-ACNC: <1 MIU/ML
HCT VFR BLD AUTO: 37.5 % (ref 36.6–50.3)
HGB BLD-MCNC: 12.7 G/DL (ref 12.1–17)
IMM GRANULOCYTES # BLD AUTO: 0 K/UL (ref 0–0.04)
IMM GRANULOCYTES NFR BLD AUTO: 1 % (ref 0–0.5)
LDH SERPL L TO P-CCNC: 144 U/L (ref 85–241)
LYMPHOCYTES # BLD: 0.8 K/UL (ref 0.8–3.5)
LYMPHOCYTES NFR BLD: 21 % (ref 12–49)
MCH RBC QN AUTO: 31.1 PG (ref 26–34)
MCHC RBC AUTO-ENTMCNC: 33.9 G/DL (ref 30–36.5)
MCV RBC AUTO: 91.9 FL (ref 80–99)
MONOCYTES # BLD: 0.7 K/UL (ref 0–1)
MONOCYTES NFR BLD: 17 % (ref 5–13)
NEUTS SEG # BLD: 2.2 K/UL (ref 1.8–8)
NEUTS SEG NFR BLD: 58 % (ref 32–75)
NRBC # BLD: 0 K/UL (ref 0–0.01)
NRBC BLD-RTO: 0 PER 100 WBC
PLATELET # BLD AUTO: 148 K/UL (ref 150–400)
PMV BLD AUTO: 8.2 FL (ref 8.9–12.9)
POTASSIUM SERPL-SCNC: 3.6 MMOL/L (ref 3.5–5.1)
PROT SERPL-MCNC: 6.8 G/DL (ref 6.4–8.2)
RBC # BLD AUTO: 4.08 M/UL (ref 4.1–5.7)
SODIUM SERPL-SCNC: 140 MMOL/L (ref 136–145)
WBC # BLD AUTO: 3.8 K/UL (ref 4.1–11.1)

## 2020-03-05 PROCEDURE — 84702 CHORIONIC GONADOTROPIN TEST: CPT

## 2020-03-05 PROCEDURE — 85025 COMPLETE CBC W/AUTO DIFF WBC: CPT

## 2020-03-05 PROCEDURE — 82105 ALPHA-FETOPROTEIN SERUM: CPT

## 2020-03-05 PROCEDURE — 83615 LACTATE (LD) (LDH) ENZYME: CPT

## 2020-03-05 PROCEDURE — 36415 COLL VENOUS BLD VENIPUNCTURE: CPT

## 2020-03-05 PROCEDURE — 80053 COMPREHEN METABOLIC PANEL: CPT

## 2020-03-05 NOTE — PROGRESS NOTES
[address] [facility] Lab Draw Note:  Arrived - 4084    Visit Vitals  /70 (BP 1 Location: Left arm, BP Patient Position: Sitting)   Pulse 78   Temp 98.5 °F (36.9 °C)   Resp 18   SpO2 99%       Labs drawn peripherally from right AC - CBC with diff, CMP, AFP, Beta HCG, QT, and LDH. Recent Results (from the past 12 hour(s))   CBC WITH AUTOMATED DIFF    Collection Time: 03/05/20  3:03 PM   Result Value Ref Range    WBC 3.8 (L) 4.1 - 11.1 K/uL    RBC 4.08 (L) 4.10 - 5.70 M/uL    HGB 12.7 12.1 - 17.0 g/dL    HCT 37.5 36.6 - 50.3 %    MCV 91.9 80.0 - 99.0 FL    MCH 31.1 26.0 - 34.0 PG    MCHC 33.9 30.0 - 36.5 g/dL    RDW 13.1 11.5 - 14.5 %    PLATELET 527 (L) 329 - 400 K/uL    MPV 8.2 (L) 8.9 - 12.9 FL    NRBC 0.0 0  WBC    ABSOLUTE NRBC 0.00 0.00 - 0.01 K/uL    NEUTROPHILS 58 32 - 75 %    LYMPHOCYTES 21 12 - 49 %    MONOCYTES 17 (H) 5 - 13 %    EOSINOPHILS 2 0 - 7 %    BASOPHILS 1 0 - 1 %    IMMATURE GRANULOCYTES 1 (H) 0.0 - 0.5 %    ABS. NEUTROPHILS 2.2 1.8 - 8.0 K/UL    ABS. LYMPHOCYTES 0.8 0.8 - 3.5 K/UL    ABS. MONOCYTES 0.7 0.0 - 1.0 K/UL    ABS. EOSINOPHILS 0.1 0.0 - 0.4 K/UL    ABS. BASOPHILS 0.0 0.0 - 0.1 K/UL    ABS. IMM. GRANS. 0.0 0.00 - 0.04 K/UL    DF AUTOMATED       See connect care for pending lab results. 1515 - Tolerated well. Pt denies any acute problems/changes. Discharged from [facility] ambulatory. No distress.

## 2020-03-06 LAB — AFP-TM SERPL-MCNC: <0.9 NG/ML (ref 0–8.3)

## 2020-03-11 ENCOUNTER — HOSPITAL ENCOUNTER (OUTPATIENT)
Dept: CT IMAGING | Age: 35
Discharge: HOME | End: 2020-03-11
Attending: INTERNAL MEDICINE
Payer: COMMERCIAL

## 2020-03-11 DIAGNOSIS — C62.10 MALIGNANT NEOPLASM OF DESCENDED TESTIS, UNSPECIFIED LATERALITY (HCC): ICD-10-CM

## 2020-03-11 DIAGNOSIS — Z85.47 ENCOUNTER FOR FOLLOW-UP SURVEILLANCE OF TESTICULAR CANCER: ICD-10-CM

## 2020-03-11 DIAGNOSIS — Z08 ENCOUNTER FOR FOLLOW-UP SURVEILLANCE OF TESTICULAR CANCER: ICD-10-CM

## 2020-03-11 PROCEDURE — 74177 CT ABD & PELVIS W/CONTRAST: CPT

## 2020-03-11 PROCEDURE — 74011636320 HC RX REV CODE- 636/320: Performed by: INTERNAL MEDICINE

## 2020-03-11 PROCEDURE — 71260 CT THORAX DX C+: CPT

## 2020-03-11 RX ORDER — SODIUM CHLORIDE 0.9 % (FLUSH) 0.9 %
10 SYRINGE (ML) INJECTION
Status: COMPLETED | OUTPATIENT
Start: 2020-03-11 | End: 2020-03-11

## 2020-03-11 RX ADMIN — IOHEXOL 50 ML: 240 INJECTION, SOLUTION INTRATHECAL; INTRAVASCULAR; INTRAVENOUS; ORAL at 16:13

## 2020-03-11 RX ADMIN — IOPAMIDOL 100 ML: 755 INJECTION, SOLUTION INTRAVENOUS at 16:12

## 2020-03-11 RX ADMIN — Medication 10 ML: at 16:13

## 2020-08-26 ENCOUNTER — VIRTUAL VISIT (OUTPATIENT)
Dept: ONCOLOGY | Age: 35
End: 2020-08-26
Payer: COMMERCIAL

## 2020-08-26 DIAGNOSIS — Z85.47 HISTORY OF TESTICULAR CANCER: Primary | ICD-10-CM

## 2020-08-26 DIAGNOSIS — C62.10 MALIGNANT NEOPLASM OF DESCENDED TESTIS, UNSPECIFIED LATERALITY (HCC): ICD-10-CM

## 2020-08-26 PROCEDURE — 99213 OFFICE O/P EST LOW 20 MIN: CPT | Performed by: INTERNAL MEDICINE

## 2020-08-26 NOTE — PROGRESS NOTES
[address] [facility]  [address], [address] [place] [address] Juan M Gilineau, [address] [facility]  [phone]       Oncology progress note        Patient: Kin Medina MRN: [id number]  SSN: [id number]    YOB: 1985  Age: 30 y.o. Sex: male        Reason for Visit:   Kin Medina is a 30 y.o. male who is seen by synchronous (real-time) audio-video technology for follow up of testicular carcinoma      Diagnosis:      1. 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 Right sided radical orchiectomy  2. Systemic chemotherapy,   BEP - s/p 3 cycles    Subjective:      Kin Medina is a 30 y.o. male male who noted a swelling in the right testicles for over 6 months. The swelling was associated with a dragging sensation. The patient then was seen by Dr. Branden Clements. He underwent a right radical orchiectomy on 03/27/2019. The pathology shows T1c disease. A CT scan was done on 04/03 which reveals metastatic disease. Mr. Freda Christine received 3 cycles  of BEP. He maintains CR. He mentioned dizziness.          Review of Systems:    Constitutional: negative  Eyes: negative  Ears, Nose, Mouth, Throat, and Face: negative  Respiratory: negative  Cardiovascular: negative  Gastrointestinal: nausea/vomiting  Genitourinary:negative  Integument/Breast: negative  Hematologic/Lymphatic: negative  Musculoskeletal:negative  Neurological: negative      Past Medical History:   Diagnosis Date    Anxiety disorder     Depression     Suicidal thoughts     Testicle cancer ([facility] [address]      Past Surgical History:   Procedure Laterality Date    HX ORTHOPAEDIC      lft knee surgery    HX ORTHOPAEDIC      right shoulder/right knee    HX OTHER SURGICAL      testiclwe removed    IR INSERT TUNL CVC W PORT OVER 5 YEARS  4/18/2019    IR REMOVE TUNL CVAD W/O PORT / PUMP  9/12/2019      No family history on file. Social History     Tobacco Use    Smoking status: Current Every Day Smoker     Packs/day: 0.50    Smokeless tobacco: Former User   Substance Use Topics    Alcohol use: Yes     Comment: per pt \"once a weekend 6 or 7 beers\"      Prior to Admission medications    Not on File          No Known Allergies        Objective:     General: alert, cooperative, no distress   Mental  status: normal mood, behavior, speech, dress, motor activity, and thought processes, able to follow commands   HENT: NCAT   Neck: no visualized mass   Resp: no respiratory distress   Neuro: no gross deficits   Skin: no discoloration or lesions of concern on visible areas   Psychiatric: normal affect, consistent with stated mood, no evidence of hallucinations       Due to this being a TeleHealth evaluation (During [place]-01 public health emergency), many elements of the physical examination are unable to be assessed. Evaluation of the following organ systems was limited: Vitals/Constitutional/EENT/Resp/CV/GI//MS/Neuro/Skin/Heme-Lymph-Imm. Lab Results   Component Value Date/Time    WBC 3.8 (L) 03/05/2020 03:03 PM    HGB 12.7 03/05/2020 03:03 PM    HCT 37.5 03/05/2020 03:03 PM    PLATELET 874 (L) 08/66/5043 03:03 PM    MCV 91.9 03/05/2020 03:03 PM       Lab Results   Component Value Date/Time    Sodium 140 03/05/2020 03:03 PM    Potassium 3.6 03/05/2020 03:03 PM    Chloride 108 03/05/2020 03:03 PM    CO2 28 03/05/2020 03:03 PM    Anion gap 4 (L) 03/05/2020 03:03 PM    Glucose 121 (H) 03/05/2020 03:03 PM    BUN 13 03/05/2020 03:03 PM    Creatinine 1.14 03/05/2020 03:03 PM    BUN/Creatinine ratio 11 (L) 03/05/2020 03:03 PM    GFR est AA >60 03/05/2020 03:03 PM    GFR est non-AA >60 03/05/2020 03:03 PM    Calcium 9.0 03/05/2020 03:03 PM    Bilirubin, total 0.4 03/05/2020 03:03 PM    Alk.  phosphatase 61 03/05/2020 03:03 PM    Protein, total 6.8 03/05/2020 03:03 PM    Albumin 4.0 03/05/2020 03:03 PM    Globulin 2.8 03/05/2020 03:03 PM    A-G Ratio 1.4 03/05/2020 03:03 PM    ALT (SGPT) 32 03/05/2020 03:03 PM       Assessment:     1. 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 Danny Dill MD      I was in the office while conducting this encounter. The patient was at his home. Consent:  He and/or his healthcare decision maker is aware that this patient-initiated Telehealth encounter is a billable service, with coverage as determined by his insurance carrier. He is aware that he may receive a bill and has provided verbal consent to proceed: Yes    Pursuant to the emergency declaration under the [address] and the [facility] [address] waiver authority and the [employer] and [employer] Act, this Virtual  Visit was conducted, with patient's (and/or legal guardian's) consent, to reduce the patient's risk of exposure to COVID-19 and provide necessary medical care. Services were provided through a video synchronous discussion virtually to substitute for in-person visit.

## 2020-08-26 NOTE — PROGRESS NOTES
Andreas Boss is a 30 y.o. male here for 6 month follow up for. Chief Complaint   Patient presents with    Cancer     Testicular     1. Have you been to the ER, urgent care clinic since your last visit? Hospitalized since your last visit? no    2. Have you seen or consulted any other health care providers outside of the [address] [place] [address] since your last visit? Include any pap smears or colon screening. no    Pt states he does not have a PCP for the following issues. He is still getting dizzy and still blacking out sometimes. Did not happen before chemo. It happens about 7-8 times per week. Still having a lot of anxiety and not sleeping well. Still having memory issues. Always has itching on legs.

## 2020-09-09 ENCOUNTER — HOSPITAL ENCOUNTER (OUTPATIENT)
Dept: CT IMAGING | Age: 35
End: 2020-09-09
Attending: INTERNAL MEDICINE
Payer: COMMERCIAL

## 2020-09-09 ENCOUNTER — HOSPITAL ENCOUNTER (OUTPATIENT)
Dept: CT IMAGING | Age: 35
Discharge: HOME | End: 2020-09-09
Attending: INTERNAL MEDICINE
Payer: COMMERCIAL

## 2020-09-09 DIAGNOSIS — C62.10 MALIGNANT NEOPLASM OF DESCENDED TESTIS, UNSPECIFIED LATERALITY (HCC): ICD-10-CM

## 2020-09-09 PROCEDURE — 74177 CT ABD & PELVIS W/CONTRAST: CPT

## 2020-09-09 PROCEDURE — 74011000636 HC RX REV CODE- 636: Performed by: INTERNAL MEDICINE

## 2020-09-09 PROCEDURE — 71260 CT THORAX DX C+: CPT

## 2020-09-09 RX ORDER — SODIUM CHLORIDE 0.9 % (FLUSH) 0.9 %
10 SYRINGE (ML) INJECTION
Status: COMPLETED | OUTPATIENT
Start: 2020-09-09 | End: 2020-09-09

## 2020-09-09 RX ORDER — BARIUM SULFATE 20 MG/ML
900 SUSPENSION ORAL
Status: DISCONTINUED | OUTPATIENT
Start: 2020-09-09 | End: 2020-09-09

## 2020-09-09 RX ADMIN — IOPAMIDOL 100 ML: 755 INJECTION, SOLUTION INTRAVENOUS at 15:27

## 2020-09-09 RX ADMIN — IOHEXOL 50 ML: 240 INJECTION, SOLUTION INTRATHECAL; INTRAVASCULAR; INTRAVENOUS; ORAL at 15:27

## 2020-09-09 RX ADMIN — Medication 10 ML: at 15:27

## 2020-09-10 ENCOUNTER — TELEPHONE (OUTPATIENT)
Dept: ONCOLOGY | Age: 35
End: 2020-09-10

## 2020-09-10 NOTE — TELEPHONE ENCOUNTER
Left detailed VM explaining labs to get done. He has the slips, or he should, if not will print and can mail or fax.

## 2022-07-27 ENCOUNTER — TELEPHONE (OUTPATIENT)
Dept: ONCOLOGY | Age: 37
End: 2022-07-27

## 2022-07-27 NOTE — TELEPHONE ENCOUNTER
Called patient back. His wife, his daughter, and himself tested positive for COVID last night. I informed him, per Oralia Patterson NP, that from a cancer standpoint, his immune system should be fine since it has been 3 years. I mentioned that he should reach out to his PCP, especially if he is symptomatic to ask for Paxlovid. Patient stated that he already called and is waiting for a call back from them. He will mention Paxlovid to them.

## 2022-07-27 NOTE — TELEPHONE ENCOUNTER
Pt called and left message on nurse's line stating he has tested positive for Covid yesterday. Pt stated that he was 3 yrs in remission and wanted to know if there was anything he should be taking as far as med's to assist him would like a call back from nurse.

## 2022-08-11 NOTE — PROGRESS NOTES
Oncology End of Shift Note Bedside shift change report given to Alessandra Pineda RN (incoming nurse) by Shawnee Hernandez RN (outgoing nurse) [facility] Report included the following information SBAR, Kardex and MAR. Shift Summary: New IV placed, new order for melatonin, no vomiting of fevers overnight Issues for Physician to Address:  Patient eager for discharge Patient on Cardiac Monitoring? [] Yes 
[x] No 
 
Rhythm:   
 
 
 
Shift Events Shawnee Hernandez RN 
 
 Azelaic Acid Counseling: Patient counseled that medicine may cause skin irritation and to avoid applying near the eyes.  In the event of skin irritation, the patient was advised to reduce the amount of the drug applied or use it less frequently.   The patient verbalized understanding of the proper use and possible adverse effects of azelaic acid.  All of the patient's questions and concerns were addressed.

## 2023-02-23 ENCOUNTER — TRANSCRIBE ORDER (OUTPATIENT)
Dept: SCHEDULING | Age: 38
End: 2023-02-23

## 2023-02-23 DIAGNOSIS — C62.90 TESTICULAR CANCER (HCC): Primary | ICD-10-CM

## 2023-03-17 ENCOUNTER — HOSPITAL ENCOUNTER (OUTPATIENT)
Dept: CT IMAGING | Age: 38
Discharge: HOME | End: 2023-03-17
Attending: UROLOGY

## 2023-03-17 DIAGNOSIS — C62.90 TESTICULAR CANCER (HCC): ICD-10-CM

## 2023-03-17 PROCEDURE — 74177 CT ABD & PELVIS W/CONTRAST: CPT

## 2023-03-17 PROCEDURE — 71260 CT THORAX DX C+: CPT

## 2023-03-17 PROCEDURE — 74011000636 HC RX REV CODE- 636: Performed by: UROLOGY

## 2023-03-17 RX ADMIN — IOMEPROL INJECTION 100 ML: 714 INJECTION, SOLUTION INTRAVASCULAR at 17:58
